# Patient Record
Sex: FEMALE | Race: WHITE | NOT HISPANIC OR LATINO | Employment: OTHER | URBAN - METROPOLITAN AREA
[De-identification: names, ages, dates, MRNs, and addresses within clinical notes are randomized per-mention and may not be internally consistent; named-entity substitution may affect disease eponyms.]

---

## 2017-01-10 ENCOUNTER — OFFICE VISIT (OUTPATIENT)
Dept: FAMILY MEDICINE | Facility: CLINIC | Age: 72
End: 2017-01-10
Payer: COMMERCIAL

## 2017-01-10 VITALS
SYSTOLIC BLOOD PRESSURE: 139 MMHG | TEMPERATURE: 97.6 F | OXYGEN SATURATION: 99 % | WEIGHT: 217 LBS | HEIGHT: 70 IN | RESPIRATION RATE: 16 BRPM | DIASTOLIC BLOOD PRESSURE: 80 MMHG | BODY MASS INDEX: 31.07 KG/M2 | HEART RATE: 59 BPM

## 2017-01-10 DIAGNOSIS — Z12.31 VISIT FOR SCREENING MAMMOGRAM: ICD-10-CM

## 2017-01-10 DIAGNOSIS — Z11.59 NEED FOR HEPATITIS C SCREENING TEST: ICD-10-CM

## 2017-01-10 DIAGNOSIS — Z23 NEED FOR VACCINATION: ICD-10-CM

## 2017-01-10 DIAGNOSIS — Z00.00 ROUTINE HISTORY AND PHYSICAL EXAMINATION OF ADULT: Primary | ICD-10-CM

## 2017-01-10 DIAGNOSIS — I10 ESSENTIAL HYPERTENSION: ICD-10-CM

## 2017-01-10 DIAGNOSIS — Z12.11 COLON CANCER SCREENING: ICD-10-CM

## 2017-01-10 DIAGNOSIS — Z11.59 SCREENING FOR VIRAL DISEASE: ICD-10-CM

## 2017-01-10 LAB
ANION GAP SERPL CALCULATED.3IONS-SCNC: 9 MMOL/L (ref 3–14)
BUN SERPL-MCNC: 24 MG/DL (ref 7–30)
CALCIUM SERPL-MCNC: 9.2 MG/DL (ref 8.5–10.1)
CHLORIDE SERPL-SCNC: 104 MMOL/L (ref 94–109)
CO2 SERPL-SCNC: 28 MMOL/L (ref 20–32)
CREAT SERPL-MCNC: 0.93 MG/DL (ref 0.52–1.04)
ERYTHROCYTE [DISTWIDTH] IN BLOOD BY AUTOMATED COUNT: 13.8 % (ref 10–15)
GFR SERPL CREATININE-BSD FRML MDRD: 59 ML/MIN/1.7M2
GLUCOSE SERPL-MCNC: 123 MG/DL (ref 70–99)
HCT VFR BLD AUTO: 42.8 % (ref 35–47)
HCV AB SERPL QL IA: NORMAL
HGB BLD-MCNC: 13.6 G/DL (ref 11.7–15.7)
MCH RBC QN AUTO: 28.9 PG (ref 26.5–33)
MCHC RBC AUTO-ENTMCNC: 31.8 G/DL (ref 31.5–36.5)
MCV RBC AUTO: 91 FL (ref 78–100)
PLATELET # BLD AUTO: 281 10E9/L (ref 150–450)
POTASSIUM SERPL-SCNC: 4.1 MMOL/L (ref 3.4–5.3)
RBC # BLD AUTO: 4.71 10E12/L (ref 3.8–5.2)
SODIUM SERPL-SCNC: 141 MMOL/L (ref 133–144)
TSH SERPL DL<=0.005 MIU/L-ACNC: 2.52 MU/L (ref 0.4–4)
WBC # BLD AUTO: 5.5 10E9/L (ref 4–11)

## 2017-01-10 PROCEDURE — 80048 BASIC METABOLIC PNL TOTAL CA: CPT | Performed by: INTERNAL MEDICINE

## 2017-01-10 PROCEDURE — G0439 PPPS, SUBSEQ VISIT: HCPCS | Performed by: INTERNAL MEDICINE

## 2017-01-10 PROCEDURE — G0009 ADMIN PNEUMOCOCCAL VACCINE: HCPCS | Performed by: INTERNAL MEDICINE

## 2017-01-10 PROCEDURE — 85027 COMPLETE CBC AUTOMATED: CPT | Performed by: INTERNAL MEDICINE

## 2017-01-10 PROCEDURE — 36415 COLL VENOUS BLD VENIPUNCTURE: CPT | Performed by: INTERNAL MEDICINE

## 2017-01-10 PROCEDURE — 84443 ASSAY THYROID STIM HORMONE: CPT | Performed by: INTERNAL MEDICINE

## 2017-01-10 PROCEDURE — 86803 HEPATITIS C AB TEST: CPT | Performed by: INTERNAL MEDICINE

## 2017-01-10 PROCEDURE — 90732 PPSV23 VACC 2 YRS+ SUBQ/IM: CPT | Performed by: INTERNAL MEDICINE

## 2017-01-10 NOTE — PROGRESS NOTES
SUBJECTIVE:                                                            Tonia Villavicencio is a 71 year old female who presents for Preventive Visit.      Are you in the first 12 months of your Medicare Part B coverage?  No    Healthy Habits:    Do you get at least three servings of calcium containing foods daily (dairy, green leafy vegetables, etc.)? 2 servings    Amount of exercise or daily activities, outside of work: no    Problems taking medications regularly No    Medication side effects: No    Have you had an eye exam in the past two years? no    Do you see a dentist twice per year? yes    Do you have sleep apnea, excessive snoring or daytime drowsiness?no    COGNITIVE SCREEN  1) Repeat 3 items (Banana, Sunrise, Chair)    2) Clock draw: NORMAL  3) 3 item recall: Recalls 3 objects  Results: 3 items recalled: COGNITIVE IMPAIRMENT LESS LIKELY    Mini-CogTM Copyright S Kathleen. Licensed by the author for use in [facility]; reprinted with permission (milton@[facility]). All rights reserved.      Patient is fasting for today's physical  She is compliant with her medications  Believes that medications are working for her  Does not have any concerns or known side effects      All Histories reviewed and updated in [facility] as appropriate.  Social History   Substance Use Topics     Smoking status: Never Smoker      Smokeless tobacco: Never Used     Alcohol Use: No       The patient does not drink >3 drinks per day nor >7 drinks per week.    Today's PHQ-2 Score:   PHQ-2 ( 1999 Pfizer) 1/10/2017 5/9/2016   Q1: Little interest or pleasure in doing things 0 0   Q2: Feeling down, depressed or hopeless 0 0   PHQ-2 Score 0 0       Do you feel safe in your environment - Yes    Do you have a Health Care Directive?: No: Advance care planning reviewed with patient; information given to patient to review.    Current providers sharing in care for this patient include:   Patient Care Team:  Blanca Mariee MD as PCP - General  (Internal Medicine)      Hearing impairment: No    Ability to successfully perform activities of daily living: Yes, no assistance needed     Fall risk:  Fallen 2 or more times in the past year?: No  Any fall with injury in the past year?: Yes  Timed Up and Go Test (>13.5 is fall risk; contact physician) : 7    Home safety:  lack of grab bars in the bathroom  click delete button to remove this line now    The following health maintenance items are reviewed in Epic and correct as of today:  Health Maintenance   Topic Date Due     HEPATITIS C SCREENING  04/16/1963     FALL RISK ASSESSMENT  01/11/2017     PNEUMOCOCCAL (2 of 2 - PPSV23) 01/14/2017     COLON CANCER SCREEN (SYSTEM ASSIGNED)  08/27/2017     INFLUENZA VACCINE (SYSTEM ASSIGNED)  09/01/2017     MAMMO SCREEN Q2 YR (SYSTEM ASSIGNED)  01/19/2018     LIPID SCREEN Q5 YR FEMALE (SYSTEM ASSIGNED)  11/04/2019     ADVANCE DIRECTIVE PLANNING Q5 YRS (NO INBASKET)  01/29/2020     TETANUS IMMUNIZATION (SYSTEM ASSIGNED)  01/11/2026     DEXA SCAN SCREENING (SYSTEM ASSIGNED)  Completed         Pneumonia Vaccine:given  Mammogram Screening: Patient over age 50, mutual decision to screen reflected in health maintenance.  History of abnormal Pap smear: NO - age 65 - see link Cervical Cytology Screening Guidelines     ROS:  C: NEGATIVE for fever, chills, change in weight  CONSTITUTIONAL:NEGATIVE for fever, chills, change in weight  I: NEGATIVE for worrisome rashes, moles or lesions  E: NEGATIVE for vision changes or irritation  ENT: NEGATIVE for ear, mouth and throat problems  R: NEGATIVE for significant cough or SOB  B: NEGATIVE for masses, tenderness or discharge  CV: NEGATIVE for chest pain, palpitations or peripheral edema  GI: NEGATIVE for nausea, abdominal pain, heartburn, or change in bowel habits  : NEGATIVE for unusual urinary or vaginal symptoms. No vaginal bleeding.  M: NEGATIVE for significant arthralgias or myalgia  N: NEGATIVE for weakness, dizziness or  "paresthesias  P: NEGATIVE for changes in mood or affect     Patient Active Problem List   Diagnosis     Essential hypertension     Hyperlipidemia LDL goal <160     Elevated blood sugar     Hyperlipidemia     Past Surgical History   Procedure Laterality Date     Gyn surgery       ovarian cyst removal and ovary     Arthroscopy knee         Social History   Substance Use Topics     Smoking status: Never Smoker      Smokeless tobacco: Never Used     Alcohol Use: No     Family History   Problem Relation Age of Onset     HEART DISEASE Mother      Prostate Cancer Father      DIABETES Brother      Breast Cancer No family hx of      Colon Cancer No family hx of          Current Outpatient Prescriptions   Medication Sig Dispense Refill     cyclobenzaprine (FLEXERIL) 10 MG tablet NOT COVER/// TAKE 0.5-1 TABLETS (5-10 MG) BY MOUTH 2 TIMES DAILY AS NEEDED FOR MUSCLE SPASMS 30 tablet 1     metoprolol (LOPRESSOR) 50 MG tablet Take 1 tablet (50 mg) by mouth 2 times daily 180 tablet 3     triamterene-hydrochlorothiazide (MAXZIDE) 75-50 MG per tablet Take 1 tablet by mouth daily 90 tablet 3     naproxen (NAPROSYN) 500 MG tablet Take 1 tablet (500 mg) by mouth 2 times daily as needed for moderate pain 60 tablet 2     Allergies   Allergen Reactions     No Known Allergies        This document serves as a record of the services and decisions personally performed and made by Blanca Mariee MD. It was created on her behalf by Jose Mcmahan, a trained medical scribe. The creation of this document is based on the provider's statements to the medical scribe.    Drew Mcmahan 10:44 AM, January 10, 2017    OBJECTIVE:                                                            /80 mmHg  Pulse 59  Temp(Src) 97.6  F (36.4  C) (Oral)  Resp 16  Ht 1.778 m (5' 10\")  Wt 98.431 kg (217 lb)  BMI 31.14 kg/m2  SpO2 99%  LMP  (LMP Unknown)  Breastfeeding? No Estimated body mass index is 31.14 kg/(m^2) as calculated from the " "following:    Height as of this encounter: 5' 10\" (1.778 m).    Weight as of this encounter: 217 lb (98.431 kg).  EXAM:   GENERAL APPEARANCE: healthy, alert and no distress  EYES: Eyes grossly normal to inspection, PERRL and conjunctivae and sclerae normal  HENT: ear canals and TM's normal, nose and mouth without ulcers or lesions, oropharynx clear and oral mucous membranes moist  NECK: no adenopathy, no asymmetry, masses, or scars and thyroid normal to palpation  RESP: lungs clear to auscultation - no rales, rhonchi or wheezes  BREAST: normal without masses, tenderness or nipple discharge and no palpable axillary masses or adenopathy  CV: regular rate and rhythm, normal S1 S2, no S3 or S4, no murmur, click or rub, no peripheral edema and peripheral pulses strong  ABDOMEN: soft, nontender, no hepatosplenomegaly, no masses and bowel sounds normal  MS: no musculoskeletal defects are noted and gait is age appropriate without ataxia  SKIN: no suspicious lesions or rashes  LE: Prominent varicose and spider veins bilaterally in LE  NEURO: Normal strength and tone, sensory exam grossly normal, mentation intact and speech normal  PSYCH: mentation appears normal and affect normal/bright    ASSESSMENT / PLAN:                                                              Tonia was seen today for physical.    Diagnoses and all orders for this visit:    Routine history and physical examination of adult  Patient is healthy other than for health concerns listed below  Testing will show medication management and reveal other health concerns  Discussed the importance of exercise and a healthy diet  -     LIPID REFLEX TO DIRECT LDL PANEL; Future  -     TSH with free T4 reflex  -     CBC with platelets    Visit for screening mammogram  -     *MA Screening Digital Bilateral; Future    Need for hepatitis C screening test  We discussed Hep C screening with patient  Patient agreed so test was ordered  Will follow up on this result  -   " "  Hepatitis C Screen Reflex to HCV RNA Quant and Genotype    Screening for viral disease  See the above note  -     Hepatitis C Screen Reflex to HCV RNA Quant and Genotype    Colon cancer screening  -     GASTROENTEROLOGY ADULT REFERRAL +/- PROCEDURE    Essential hypertension  Discussed the importance of keeping BP under good control to reduce the risk of heart attack and stroke.   Advised to monitor bp once a week  at home and bring those readings on next visit.  Notify us if bp readings consistently stay greater than 140/90 mmHg  -     Basic metabolic panel    Need for vaccination  -     Pneumococcal vaccine 23 valent (Pneumovax) [30592]  -     ADMIN PNEUMOCOCCAL VACCINE (For MEDICARE Pt. ONLY) []      End of Life Planning:  Patient currently has an advanced directive: Yes.  Practitioner is supportive of decision.    COUNSELING:  Reviewed preventive health counseling, as reflected in patient instructions  Special attention given to:       Regular exercise       Healthy diet/nutrition    BP Screening:   Last 3 BP Readings:    BP Readings from Last 3 Encounters:   01/10/17 139/80   11/16/16 127/82   09/06/16 146/98       The following was recommended to the patient:  Re-screen BP within a year and recommended lifestyle modifications      Estimated body mass index is 31.14 kg/(m^2) as calculated from the following:    Height as of this encounter: 5' 10\" (1.778 m).    Weight as of this encounter: 217 lb (98.431 kg).  Weight management plan: Discussed healthy diet and exercise guidelines and patient will follow up in 6 months in clinic to re-evaluate.   reports that she has never smoked. She has never used smokeless tobacco.      Appropriate preventive services were discussed with this patient, including applicable screening as appropriate for cardiovascular disease, diabetes, osteopenia/osteoporosis, and glaucoma.  As appropriate for age/gender, discussed screening for colorectal cancer, prostate cancer, breast " cancer, and cervical cancer. Checklist reviewing preventive services available has been given to the patient.    Reviewed patients plan of care and provided an AVS. The Basic Care Plan (routine screening as documented in Health Maintenance) for Tonia meets the Care Plan requirement. This Care Plan has been established and reviewed with the Patient.    Counseling Resources:  ATP IV Guidelines  Pooled Cohorts Equation Calculator  Breast Cancer Risk Calculator  FRAX Risk Assessment  ICSI Preventive Guidelines  Dietary Guidelines for Americans, 2010  USDA's MyPlate  ASA Prophylaxis  Lung CA Screening    Follow up in 6 months  Patient was advised to seek sooner medical attention if there is any new or worsening symptoms    The information in this document, created by the medical scribe for me, accurately reflects the services I personally performed and the decisions made by me. I have reviewed and approved this document for accuracy prior to leaving the patient care area.  Blanca Mariee MD  10:24 AM, 01/10/2017    Blanca Mariee MD  [facility]

## 2017-01-10 NOTE — MR AVS SNAPSHOT
After Visit Summary   1/10/2017    Tonia Villavicencio    MRN: [id number]           Patient Information     Date Of Birth          1945        Visit Information        Provider Department      1/10/2017 9:30 AM Blanca Mariee MD [facility]        Today's Diagnoses     Routine history and physical examination of adult    -  1     Visit for screening mammogram         Need for hepatitis C screening test         Screening for viral disease         Colon cancer screening         Essential hypertension           Care Instructions      Preventive Health Recommendations    Female Ages 65 +    Yearly exam:     See your health care provider every year in order to  o Review health changes.   o Discuss preventive care.    o Review your medicines if your doctor has prescribed any.      You no longer need a yearly Pap test unless you've had an abnormal Pap test in the past 10 years. If you have vaginal symptoms, such as bleeding or discharge, be sure to talk with your provider about a Pap test.      Every 1 to 2 years, have a mammogram.  If you are over 69, talk with your health care provider about whether or not you want to continue having screening mammograms.      Every 10 years, have a colonoscopy. Or, have a yearly FIT test (stool test). These exams will check for colon cancer.       Have a cholesterol test every 5 years, or more often if your doctor advises it.       Have a diabetes test (fasting glucose) every three years. If you are at risk for diabetes, you should have this test more often.       At age 65, have a bone density scan (DEXA) to check for osteoporosis (brittle bone disease).    Shots:    Get a flu shot each year.    Get a tetanus shot every 10 years.    Talk to your doctor about your pneumonia vaccines. There are now two you should receive - Pneumovax (PPSV 23) and Prevnar (PCV 13).    Talk to your doctor about the shingles vaccine.    Talk to your doctor about the hepatitis  B vaccine.    Nutrition:     Eat at least 5 servings of fruits and vegetables each day.      Eat whole-grain bread, whole-wheat pasta and brown rice instead of white grains and rice.      Talk to your provider about Calcium and Vitamin D.     Lifestyle    Exercise at least 150 minutes a week (30 minutes a day, 5 days a week). This will help you control your weight and prevent disease.      Limit alcohol to one drink per day.      No smoking.       Wear sunscreen to prevent skin cancer.       See your dentist twice a year for an exam and cleaning.      See your eye doctor every 1 to 2 years to screen for conditions such as glaucoma, macular degeneration and cataracts.      Monitor your blood pressure once a week  at home.  Bring those readings on your next visit.  Notify us if your blood pressure readings consistently stays greater than 140/90.  Follow up in 6 months          Follow-ups after your visit        Additional Services     GASTROENTEROLOGY ADULT REFERRAL +/- PROCEDURE       Your provider has referred you to Gastroenterology Services.    English    Procedure/Referral: PROCEDURE ONLY - COLONOSCOPY - Reason for procedure: Screening  [facility]: Liu Mayen (all patients will be contacted by JUDITH Mayen to schedule appointment - This includes [facility], [facility] and [facility]) - [place]ia [phone]   [email or web address]      Please be aware that coverage of these services is subject to the terms and limitations of your health insurance plan.  Call member services at your health plan with any benefit or coverage questions.  Any procedures must be performed at a [facility] facility OR coordinated by your clinic's referral office.    Please bring the following with you to your appointment:    (1) Any X-Rays, CTs or MRIs which have been performed.  Contact the facility where they were done to arrange for  " prior to your scheduled appointment.    (2) List of current medications   (3) This referral request   (4) Any documents/labs given to you for this referral                  Future tests that were ordered for you today     Open Future Orders        Priority Expected Expires Ordered    *MA Screening Digital Bilateral Routine  1/10/2018 1/10/2017    LIPID REFLEX TO DIRECT LDL PANEL Routine 2017 2017 1/10/2017            Who to contact     If you have questions or need follow up information about today's clinic visit or your schedule please contact [facility] directly at [phone].  Normal or non-critical lab and imaging results will be communicated to you by Rocket.Lahart, letter or phone within 4 business days after the clinic has received the results. If you do not hear from us within 7 days, please contact the clinic through R[employer]Lahart or phone. If you have a critical or abnormal lab result, we will notify you by phone as soon as possible.  Submit refill requests through Netotiate or call your pharmacy and they will forward the refill request to us. Please allow 3 business days for your refill to be completed.          Additional Information About Your Visit        MyChart Information     Netotiate lets you send messages to your doctor, view your test results, renew your prescriptions, schedule appointments and more. To sign up, go to [email or web address] . Click on \"Log in\" on the left side of the screen, which will take you to the Welcome page. Then click on \"Sign up Now\" on the right side of the page.     You will be asked to enter the access code listed below, as well as some personal information. Please follow the directions to create your username and password.     Your access code is: [email or web address]  Expires: 2017 10:34 AM     Your access code will  in 90 days. If you need help or a new code, please call your [facility] [place] or [phone].        Care EveryWhere ID     This is " "your Care EveryWhere ID. This could be used by other organizations to access your [facility] medical records  [id number]        Your Vitals Were     Pulse Temperature Respirations    59 97.6  F (36.4  C) (Oral) 16    Height BMI (Body Mass Index) Pulse Oximetry    5' 10\" (1.778 m) 31.14 kg/m2 99%    Last Period Breastfeeding?       (LMP Unknown) No        Blood Pressure from Last 3 Encounters:   01/10/17 139/80   11/16/16 127/82   09/06/16 146/98    Weight from Last 3 Encounters:   01/10/17 217 lb (98.431 kg)   11/16/16 215 lb (97.523 kg)   05/09/16 219 lb 9.6 oz (99.61 kg)              We Performed the Following     Basic metabolic panel     CBC with platelets     GASTROENTEROLOGY ADULT REFERRAL +/- PROCEDURE     Hepatitis C Screen Reflex to HCV RNA Quant and Genotype     TSH with free T4 reflex        Primary Care Provider Office Phone # Fax #    Blanca JOE Mariee [phone]       [facility]    [address] [facility] [address]  [place] [address]        Thank you!     Thank you for choosing [facility]  for your care. Our goal is always to provide you with excellent care. Hearing back from our patients is one way we can continue to improve our services. Please take a few minutes to complete the written survey that you may receive in the mail after your visit with us. Thank you!             Your Updated Medication List - Protect others around you: Learn how to safely use, store and throw away your medicines at www.disposemymeds.org.          This list is accurate as of: 1/10/17 10:21 AM.  Always use your most recent med list.                   Brand Name Dispense Instructions for use    cyclobenzaprine 10 MG tablet    FLEXERIL    30 tablet    NOT COVER/// TAKE 0.5-1 TABLETS (5-10 MG) BY MOUTH 2 TIMES DAILY AS NEEDED FOR MUSCLE SPASMS       metoprolol 50 MG tablet    LOPRESSOR    180 tablet    Take 1 tablet (50 mg) by mouth 2 times daily       naproxen 500 MG tablet    " NAPROSYN    60 tablet    Take 1 tablet (500 mg) by mouth 2 times daily as needed for moderate pain       triamterene-hydrochlorothiazide 75-50 MG per tablet    MAXZIDE    90 tablet    Take 1 tablet by mouth daily

## 2017-01-10 NOTE — NURSING NOTE
"Chief Complaint   Patient presents with     Physical     fasting       Initial /88 mmHg  Pulse 59  Temp(Src) 97.6  F (36.4  C) (Oral)  Resp 16  Ht 5' 10\" (1.778 m)  Wt 217 lb (98.431 kg)  BMI 31.14 kg/m2  SpO2 99%  LMP  (LMP Unknown)  Breastfeeding? No Estimated body mass index is 31.14 kg/(m^2) as calculated from the following:    Height as of this encounter: 5' 10\" (1.778 m).    Weight as of this encounter: 217 lb (98.431 kg).  BP completed using cuff size: joel Galeano CMA January 10, 2017 9:52 AM      "

## 2017-01-10 NOTE — PATIENT INSTRUCTIONS

## 2017-01-10 NOTE — Clinical Note
[facility]  [address] [facility]  [address]  [place]  [address]  Tel: [phone]    January 11, 2017    Tonia Villavicencio  [address] [place] [facility] [address]        Dear Ms. Villavicencio,    Hepatitis C Antibody is negative.  CBC result which includes white count Hemoglobin and  Platelet Counts is normal.   TSH which is thyroid hormone is normal.  Basic metabolic panel which includes electrolytes and kidney function is satisfactory.  Glucose which is your blood sugar is mildly elevated.  Eat low cholesterol low fat  diet and do regular physical activity. Avoid high sugar containing food    If you have any further questions or problems, please contact our office.      Sincerely,    Blanca Mariee MD    Results for orders placed or performed in visit on 01/10/17   TSH with free T4 reflex   Result Value Ref Range    TSH 2.52 0.40 - 4.00 mU/L   CBC with platelets   Result Value Ref Range    WBC 5.5 4.0 - 11.0 10e9/L    RBC Count 4.71 3.8 - 5.2 10e12/L    Hemoglobin 13.6 11.7 - 15.7 g/dL    Hematocrit 42.8 35.0 - 47.0 %    MCV 91 78 - 100 fl    MCH 28.9 26.5 - 33.0 pg    MCHC 31.8 31.5 - 36.5 g/dL    RDW 13.8 10.0 - 15.0 %    Platelet Count 281 150 - 450 10e9/L   Basic metabolic panel   Result Value Ref Range    Sodium 141 133 - 144 mmol/L    Potassium 4.1 3.4 - 5.3 mmol/L    Chloride 104 94 - 109 mmol/L    Carbon Dioxide 28 20 - 32 mmol/L    Anion Gap 9 3 - 14 mmol/L    Glucose 123 (H) 70 - 99 mg/dL    Urea Nitrogen 24 7 - 30 mg/dL    Creatinine 0.93 0.52 - 1.04 mg/dL    GFR Estimate 59 (L) >60 mL/min/1.7m2    GFR Estimate If Black 72 >60 mL/min/1.7m2    Calcium 9.2 8.5 - 10.1 mg/dL   Hepatitis C Screen Reflex to HCV RNA Quant and Genotype   Result Value Ref Range    Hepatitis C Antibody  NR     Nonreactive   Assay performance characteristics have not been established for newborns,   infants, and children

## 2017-01-11 ASSESSMENT — PATIENT HEALTH QUESTIONNAIRE - PHQ9: SUM OF ALL RESPONSES TO PHQ QUESTIONS 1-9: 0

## 2017-01-11 NOTE — PROGRESS NOTES
Quick Note:    Please Notify the patient  Hepatitis C Antibody is negative.  CBC result which includes white count Hemoglobin and Platelet Counts is normal.   TSH which is thyroid hormone is normal.  Basic metabolic panel which includes electrolytes and kidney function is satisfactory.  Glucose which is your blood sugar is mildly elevated.  Eat low cholesterol low fat diet and do regular physical activity. Avoid high sugar containing food  Please send the copy of lab results also to this patient.        ______

## 2017-01-20 ENCOUNTER — HOSPITAL ENCOUNTER (OUTPATIENT)
Dept: MAMMOGRAPHY | Facility: CLINIC | Age: 72
Discharge: HOME | End: 2017-01-20
Attending: INTERNAL MEDICINE | Admitting: INTERNAL MEDICINE
Payer: MEDICARE

## 2017-01-20 DIAGNOSIS — Z12.31 VISIT FOR SCREENING MAMMOGRAM: ICD-10-CM

## 2017-01-20 PROCEDURE — G0202 SCR MAMMO BI INCL CAD: HCPCS

## 2017-02-28 ENCOUNTER — OFFICE VISIT (OUTPATIENT)
Dept: FAMILY MEDICINE | Facility: CLINIC | Age: 72
End: 2017-02-28
Payer: COMMERCIAL

## 2017-02-28 VITALS
SYSTOLIC BLOOD PRESSURE: 134 MMHG | HEART RATE: 74 BPM | BODY MASS INDEX: 31.98 KG/M2 | WEIGHT: 223.4 LBS | DIASTOLIC BLOOD PRESSURE: 85 MMHG | HEIGHT: 70 IN | OXYGEN SATURATION: 99 % | TEMPERATURE: 97.6 F

## 2017-02-28 DIAGNOSIS — N76.0 VAGINITIS AND VULVOVAGINITIS: Primary | ICD-10-CM

## 2017-02-28 LAB
MICRO REPORT STATUS: ABNORMAL
SPECIMEN SOURCE: ABNORMAL
WET PREP SPEC: ABNORMAL

## 2017-02-28 PROCEDURE — 99213 OFFICE O/P EST LOW 20 MIN: CPT | Performed by: NURSE PRACTITIONER

## 2017-02-28 PROCEDURE — 87210 SMEAR WET MOUNT SALINE/INK: CPT | Performed by: NURSE PRACTITIONER

## 2017-02-28 RX ORDER — FLUCONAZOLE 150 MG/1
150 TABLET ORAL ONCE
Qty: 2 TABLET | Refills: 0 | Status: SHIPPED | OUTPATIENT
Start: 2017-02-28 | End: 2017-02-28

## 2017-02-28 NOTE — MR AVS SNAPSHOT
"              After Visit Summary   2017    Tonia Villavicencio    MRN: [id number]           Patient Information     Date Of Birth          1945        Visit Information        Provider Department      2017 2:30 PM Trish Helton APRN CNP [facility]        Today's Diagnoses     Vaginitis and vulvovaginitis    -  1       Follow-ups after your visit        Who to contact     If you have questions or need follow up information about today's clinic visit or your schedule please contact [facility] directly at [phone].  Normal or non-critical lab and imaging results will be communicated to you by FXTriphart, letter or phone within 4 business days after the clinic has received the results. If you do not hear from us within 7 days, please contact the clinic through FXTriphart or phone. If you have a critical or abnormal lab result, we will notify you by phone as soon as possible.  Submit refill requests through [email or web address] or call your pharmacy and they will forward the refill request to us. Please allow 3 business days for your refill to be completed.          Additional Information About Your Visit        MyChart Information     [email or web address] lets you send messages to your doctor, view your test results, renew your prescriptions, schedule appointments and more. To sign up, go to [email or web address] . Click on \"Log in\" on the left side of the screen, which will take you to the Welcome page. Then click on \"Sign up Now\" on the right side of the page.     You will be asked to enter the access code listed below, as well as some personal information. Please follow the directions to create your username and password.     Your access code is: [email or web address]  Expires: 2017  4:20 PM     Your access code will  in 90 days. If you need help or a new code, please call your [facility] or [phone].        Care EveryWhere ID     This is your Care EveryWhere ID. This could be used by " "other organizations to access your [facility] medical records  [id number]        Your Vitals Were     Pulse Temperature Height Last Period Pulse Oximetry Breastfeeding?    74 97.6  F (36.4  C) (Oral) 5' 10\" (1.778 m) (LMP Unknown) 99% No    BMI (Body Mass Index)                   32.05 kg/m2            Blood Pressure from Last 3 Encounters:   02/28/17 134/85   01/10/17 139/80   11/16/16 127/82    Weight from Last 3 Encounters:   02/28/17 223 lb 6.4 oz (101.3 kg)   01/10/17 217 lb (98.4 kg)   11/16/16 215 lb (97.5 kg)              We Performed the Following     Wet prep          Today's Medication Changes          These changes are accurate as of: 2/28/17  4:20 PM.  If you have any questions, ask your nurse or doctor.               Start taking these medicines.        Dose/Directions    fluconazole 150 MG tablet   Commonly known as:  DIFLUCAN   Used for:  Vaginitis and vulvovaginitis   Started by:  Trish Helton APRN CNP        Dose:  150 mg   Take 1 tablet (150 mg) by mouth once for 1 dose Take one today and one again in 3 days   Quantity:  2 tablet   Refills:  0            Where to get your medicines      These medications were sent to [facility] [address] [facility] [address]  [address], [place] [address], [facility] [address]     Phone:  [phone]     fluconazole 150 MG tablet                Primary Care Provider Office Phone # Fax #    Blanca Mariee [phone]       [facility]    [address] [place] [address] [place] [facility] [address]  [facility] [address]        Thank you!     Thank you for choosing [facility]  for your care. Our goal is always to provide you with excellent care. Hearing back from our patients is one way we can continue to improve our services. Please take a few minutes to complete the written survey that you may receive in the mail after your visit with us. Thank you!             Your Updated Medication List - Protect others around " you: Learn how to safely use, store and throw away your medicines at www.disposemymeds.org.          This list is accurate as of: 2/28/17  4:20 PM.  Always use your most recent med list.                   Brand Name Dispense Instructions for use    cyclobenzaprine 10 MG tablet    FLEXERIL    30 tablet    NOT COVER/// TAKE 0.5-1 TABLETS (5-10 MG) BY MOUTH 2 TIMES DAILY AS NEEDED FOR MUSCLE SPASMS       fluconazole 150 MG tablet    DIFLUCAN    2 tablet    Take 1 tablet (150 mg) by mouth once for 1 dose Take one today and one again in 3 days       metoprolol 50 MG tablet    LOPRESSOR    180 tablet    Take 1 tablet (50 mg) by mouth 2 times daily       naproxen 500 MG tablet    NAPROSYN    60 tablet    Take 1 tablet (500 mg) by mouth 2 times daily as needed for moderate pain       triamterene-hydrochlorothiazide 75-50 MG per tablet    MAXZIDE    90 tablet    Take 1 tablet by mouth daily

## 2017-02-28 NOTE — NURSING NOTE
"Chief Complaint   Patient presents with     Vaginitis       Initial /85 (BP Location: Left arm, Patient Position: Chair, Cuff Size: Adult Large)  Pulse 74  Temp 97.6  F (36.4  C) (Oral)  Ht 5' 10\" (1.778 m)  Wt 223 lb 6.4 oz (101.3 kg)  LMP  (LMP Unknown)  SpO2 99%  Breastfeeding? No  BMI 32.05 kg/m2 Estimated body mass index is 32.05 kg/(m^2) as calculated from the following:    Height as of this encounter: 5' 10\" (1.778 m).    Weight as of this encounter: 223 lb 6.4 oz (101.3 kg).  Medication Reconciliation: complete.  Stephany Voss CMA    "

## 2017-02-28 NOTE — LETTER
[place] [address] [facility]  [address]  [place]  [address]  Tel: [phone]    February 28, 2017    Tonia Villavicencio  [address] [place] [facility] [address]        Dear Ms. Villavicencio,    The results of your recent labs are enclosed.    If you have any further questions or problems, please contact our office.      Sincerely,    Trish Helton, NP/daryn    Results for orders placed or performed in visit on 02/28/17   Wet prep   Result Value Ref Range    Specimen Description Vagina     Wet Prep (A)      No Trichomonas seen  No clue cells seen  Yeast seen      Micro Report Status FINAL 02/28/2017            Enclosure: Lab Results

## 2017-02-28 NOTE — PROGRESS NOTES
SUBJECTIVE:                                                    Tonia Villavicencio is a 71 year old female who presents to clinic today for the following health issues:      Vaginal Symptoms      Duration: 1 week ago was on amoxicillin for a gum infection  And developed an intravaginal itching, no known discharge no odor     Description  itching    Intensity:  mild    Accompanying signs and symptoms (fever/dysuria/abdominal or back pain): None    History  Sexually active:   Possibility of pregnancy: No  Recent antibiotic use: YES- amoxicillin    Precipitating or alleviating factors: None    Therapies tried and outcome: none   Outcome:        Problem list and histories reviewed & adjusted, as indicated.  Additional history: as documented    Patient Active Problem List   Diagnosis     Essential hypertension     Hyperlipidemia LDL goal <160     Elevated blood sugar     Hyperlipidemia     Past Surgical History   Procedure Laterality Date     Gyn surgery       ovarian cyst removal and ovary     Arthroscopy knee         Social History   Substance Use Topics     Smoking status: Never Smoker     Smokeless tobacco: Never Used     Alcohol use No     Family History   Problem Relation Age of Onset     HEART DISEASE Mother      Prostate Cancer Father      DIABETES Brother      Breast Cancer No family hx of      Colon Cancer No family hx of          Current Outpatient Prescriptions   Medication Sig Dispense Refill     cyclobenzaprine (FLEXERIL) 10 MG tablet NOT COVER/// TAKE 0.5-1 TABLETS (5-10 MG) BY MOUTH 2 TIMES DAILY AS NEEDED FOR MUSCLE SPASMS 30 tablet 1     metoprolol (LOPRESSOR) 50 MG tablet Take 1 tablet (50 mg) by mouth 2 times daily 180 tablet 3     triamterene-hydrochlorothiazide (MAXZIDE) 75-50 MG per tablet Take 1 tablet by mouth daily 90 tablet 3     naproxen (NAPROSYN) 500 MG tablet Take 1 tablet (500 mg) by mouth 2 times daily as needed for moderate pain 60 tablet 2     Allergies   Allergen Reactions     No Known  "Allergies        Reviewed and updated as needed this visit by clinical staff  Allergies  Meds       Reviewed and updated as needed this visit by Provider         ROS:  Constitutional, HEENT, cardiovascular, pulmonary, gi and gu systems are negative, except as otherwise noted.    OBJECTIVE:                                                    /85 (BP Location: Left arm, Patient Position: Chair, Cuff Size: Adult Large)  Pulse 74  Temp 97.6  F (36.4  C) (Oral)  Ht 5' 10\" (1.778 m)  Wt 223 lb 6.4 oz (101.3 kg)  LMP  (LMP Unknown)  SpO2 99%  Breastfeeding? No  BMI 32.05 kg/m2  Body mass index is 32.05 kg/(m^2).  GENERAL: healthy, alert and no distress   (female): normal female external genitalia, normal urethral meatus, vaginal mucosa, without  discharge    Diagnostic Test Results:  Results for orders placed or performed in visit on 02/28/17   Wet prep   Result Value Ref Range    Specimen Description Vagina     Wet Prep (A)      No Trichomonas seen  No clue cells seen  Yeast seen      Micro Report Status FINAL 02/28/2017         ASSESSMENT/PLAN:                                                        ICD-10-CM    1. Vaginitis and vulvovaginitis N76.0 Wet prep     fluconazole (DIFLUCAN) 150 MG tablet   probably a result of having been on the amoxicillin      HALEIGH Rivera [facility] SHELLIE jaime  "

## 2017-09-27 ENCOUNTER — TELEPHONE (OUTPATIENT)
Dept: FAMILY MEDICINE | Facility: CLINIC | Age: 72
End: 2017-09-27

## 2017-09-28 NOTE — TELEPHONE ENCOUNTER
9/27/2017    Call Regarding Preventive Health Screening Colonoscopy    Attempt 1    Message on voicemail     Comments:           Outreach   AT

## 2017-11-19 DIAGNOSIS — I10 BENIGN ESSENTIAL HYPERTENSION: ICD-10-CM

## 2017-11-21 RX ORDER — METOPROLOL TARTRATE 50 MG
TABLET ORAL
Qty: 180 TABLET | Refills: 0 | Status: SHIPPED | OUTPATIENT
Start: 2017-11-21 | End: 2018-03-05

## 2017-11-21 RX ORDER — TRIAMTERENE AND HYDROCHLOROTHIAZIDE 75; 50 MG/1; MG/1
TABLET ORAL
Qty: 90 TABLET | Refills: 0 | Status: SHIPPED | OUTPATIENT
Start: 2017-11-21 | End: 2018-02-22

## 2018-02-22 DIAGNOSIS — I10 BENIGN ESSENTIAL HYPERTENSION: ICD-10-CM

## 2018-02-22 RX ORDER — TRIAMTERENE AND HYDROCHLOROTHIAZIDE 75; 50 MG/1; MG/1
TABLET ORAL
Qty: 30 TABLET | Refills: 0 | Status: SHIPPED | OUTPATIENT
Start: 2018-02-22 | End: 2018-03-05

## 2018-02-22 NOTE — TELEPHONE ENCOUNTER
"Last Written Prescription Date:  11/21/17  Last Fill Quantity: 90 tablet,  # refills: 0   Last office visit: 2/28/2017 (Sunitha) with prescribing provider:  1/10/17 (Jaylene)   Future Office Visit:      Requested Prescriptions   Pending Prescriptions Disp Refills     triamterene-hydrochlorothiazide (MAXZIDE) 75-50 MG per tablet [Pharmacy Med Name: TRIAMTERENE-HCTZ 75-50 MG TAB] 90 tablet 0     Sig: TAKE 1 TABLET BY MOUTH DAILY    Diuretics (Including Combos) Protocol Failed    2/22/2018  8:29 AM       Failed - Normal serum creatinine on file in past 12 months    Recent Labs   Lab Test  01/10/17   1036   CR  0.93             Failed - Normal serum potassium on file in past 12 months    Recent Labs   Lab Test  01/10/17   1036   POTASSIUM  4.1                   Failed - Normal serum sodium on file in past 12 months    Recent Labs   Lab Test  01/10/17   1036   NA  141             Passed - Blood pressure under 140/90 in past 12 months    BP Readings from Last 3 Encounters:   02/28/17 134/85   01/10/17 139/80   11/16/16 127/82                Passed - Recent or future visit with authorizing provider's specialty    Patient had office visit in the last year or has a visit in the next 30 days with authorizing provider.  See \"Patient Info\" tab in inbasket, or \"Choose Columns\" in Meds & Orders section of the refill encounter.            Passed - Patient is age 18 or older       Passed - No active pregancy on record       Passed - No positive pregnancy test in past 12 months          "

## 2018-02-22 NOTE — TELEPHONE ENCOUNTER
30 day gamal refill given.  Patient is due for an office visit.  Please notify and assist in scheduling.  Kandy oPon RN  [employer]

## 2018-03-05 ENCOUNTER — OFFICE VISIT (OUTPATIENT)
Dept: FAMILY MEDICINE | Facility: CLINIC | Age: 73
End: 2018-03-05
Payer: COMMERCIAL

## 2018-03-05 VITALS
SYSTOLIC BLOOD PRESSURE: 166 MMHG | HEART RATE: 63 BPM | WEIGHT: 215 LBS | HEIGHT: 70 IN | OXYGEN SATURATION: 98 % | BODY MASS INDEX: 30.78 KG/M2 | DIASTOLIC BLOOD PRESSURE: 94 MMHG

## 2018-03-05 DIAGNOSIS — R73.9 ELEVATED BLOOD SUGAR: ICD-10-CM

## 2018-03-05 DIAGNOSIS — Z91.81 AT RISK FOR FALLING: ICD-10-CM

## 2018-03-05 DIAGNOSIS — I10 BENIGN ESSENTIAL HYPERTENSION: Primary | ICD-10-CM

## 2018-03-05 DIAGNOSIS — Z12.11 SCREEN FOR COLON CANCER: ICD-10-CM

## 2018-03-05 DIAGNOSIS — E66.811 CLASS 1 OBESITY DUE TO EXCESS CALORIES WITH BODY MASS INDEX (BMI) OF 30.0 TO 30.9 IN ADULT, UNSPECIFIED WHETHER SERIOUS COMORBIDITY PRESENT: ICD-10-CM

## 2018-03-05 DIAGNOSIS — Z23 NEED FOR PROPHYLACTIC VACCINATION AND INOCULATION AGAINST INFLUENZA: ICD-10-CM

## 2018-03-05 DIAGNOSIS — E66.09 CLASS 1 OBESITY DUE TO EXCESS CALORIES WITH BODY MASS INDEX (BMI) OF 30.0 TO 30.9 IN ADULT, UNSPECIFIED WHETHER SERIOUS COMORBIDITY PRESENT: ICD-10-CM

## 2018-03-05 LAB
ANION GAP SERPL CALCULATED.3IONS-SCNC: 5 MMOL/L (ref 3–14)
BUN SERPL-MCNC: 19 MG/DL (ref 7–30)
CALCIUM SERPL-MCNC: 9.4 MG/DL (ref 8.5–10.1)
CHLORIDE SERPL-SCNC: 103 MMOL/L (ref 94–109)
CO2 SERPL-SCNC: 29 MMOL/L (ref 20–32)
CREAT SERPL-MCNC: 0.97 MG/DL (ref 0.52–1.04)
GFR SERPL CREATININE-BSD FRML MDRD: 56 ML/MIN/1.7M2
GLUCOSE SERPL-MCNC: 155 MG/DL (ref 70–99)
POTASSIUM SERPL-SCNC: 3.9 MMOL/L (ref 3.4–5.3)
SODIUM SERPL-SCNC: 137 MMOL/L (ref 133–144)

## 2018-03-05 PROCEDURE — 99214 OFFICE O/P EST MOD 30 MIN: CPT | Mod: 25 | Performed by: PHYSICIAN ASSISTANT

## 2018-03-05 PROCEDURE — 80048 BASIC METABOLIC PNL TOTAL CA: CPT | Performed by: PHYSICIAN ASSISTANT

## 2018-03-05 PROCEDURE — 36415 COLL VENOUS BLD VENIPUNCTURE: CPT | Performed by: PHYSICIAN ASSISTANT

## 2018-03-05 PROCEDURE — 90662 IIV NO PRSV INCREASED AG IM: CPT | Performed by: PHYSICIAN ASSISTANT

## 2018-03-05 PROCEDURE — G0008 ADMIN INFLUENZA VIRUS VAC: HCPCS | Performed by: PHYSICIAN ASSISTANT

## 2018-03-05 RX ORDER — TRIAMTERENE AND HYDROCHLOROTHIAZIDE 75; 50 MG/1; MG/1
1 TABLET ORAL DAILY
Qty: 90 TABLET | Refills: 3 | Status: SHIPPED | OUTPATIENT
Start: 2018-03-05 | End: 2019-03-16

## 2018-03-05 RX ORDER — METOPROLOL TARTRATE 50 MG
TABLET ORAL
Qty: 180 TABLET | Refills: 3 | Status: SHIPPED | OUTPATIENT
Start: 2018-03-05 | End: 2019-03-26

## 2018-03-05 RX ORDER — AMLODIPINE BESYLATE 2.5 MG/1
2.5 TABLET ORAL DAILY
Qty: 30 TABLET | Refills: 1 | Status: SHIPPED | OUTPATIENT
Start: 2018-03-05 | End: 2018-05-11 | Stop reason: ALTCHOICE

## 2018-03-05 NOTE — MR AVS SNAPSHOT
After Visit Summary   3/5/2018    Tonia Villavicencio    MRN: [id number]           Patient Information     Date Of Birth          1945        Visit Information        Provider Department      3/5/2018 10:00 AM Elizabeth Perez PA-C [facility]        Today's Diagnoses     Benign essential hypertension    -  1    Screen for colon cancer        At risk for falling          Care Instructions    Add Norvasc 2.5mg daily for blood pressure  Goal <130/80  Check readings and follow up with Dr. Mariee in the next month  Get your mammogram in [address]  Schedule colonoscopy     Flu shot today    Monitor diet with [email or web address]  Keep calories around 1500 calories per day            Follow-ups after your visit        Additional Services     GASTROENTEROLOGY ADULT REF PROCEDURE ONLY Denny Mayen [phone]; No Provider Preference       Last Lab Result: Creatinine (mg/dL)       Date                     Value                 01/10/2017               0.93             ----------  Body mass index is 30.85 kg/(m^2).      Patient will be contacted to schedule procedure.     Please be aware that coverage of these services is subject to the terms and limitations of your health insurance plan.  Call member services at your health plan with any benefit or coverage questions.  Any procedures must be performed at a [facility] facility OR coordinated by your clinic's referral office.    Please bring the following with you to your appointment:    (1) Any X-Rays, CTs or MRIs which have been performed.  Contact the facility where they were done to arrange for  prior to your scheduled appointment.    (2) List of current medications   (3) This referral request   (4) Any documents/labs given to you for this referral                  Who to contact     If you have questions or need follow up information about today's clinic visit or your schedule please contact [facility] SHELLIE directly at  "[phone].  Normal or non-critical lab and imaging results will be communicated to you by MyChart, letter or phone within 4 business days after the clinic has received the results. If you do not hear from us within 7 days, please contact the clinic through Angiologixhart or phone. If you have a critical or abnormal lab result, we will notify you by phone as soon as possible.  Submit refill requests through Topspin Media or call your pharmacy and they will forward the refill request to us. Please allow 3 business days for your refill to be completed.          Additional Information About Your Visit        AngiologixharStartX Information     Topspin Media lets you send messages to your doctor, view your test results, renew your prescriptions, schedule appointments and more. To sign up, go to [email or web address] . Click on \"Log in\" on the left side of the screen, which will take you to the Welcome page. Then click on \"Sign up Now\" on the right side of the page.     You will be asked to enter the access code listed below, as well as some personal information. Please follow the directions to create your username and password.     Your access code is: [email or web address]  Expires: 6/3/2018 10:33 AM     Your access code will  in 90 days. If you need help or a new code, please call your [facility] clinic or [phone].        Care EveryWhere ID     This is your Care EveryWhere ID. This could be used by other organizations to access your [facility] medical records  [id number]        Your Vitals Were     Pulse Height Last Period Pulse Oximetry Breastfeeding? BMI (Body Mass Index)    63 5' 10\" (1.778 m) (LMP Unknown) 98% No 30.85 kg/m2       Blood Pressure from Last 3 Encounters:   17 134/85   01/10/17 139/80   16 127/82    Weight from Last 3 Encounters:   18 215 lb (97.5 kg)   17 223 lb 6.4 oz (101.3 kg)   01/10/17 217 lb (98.4 kg)              We Performed the Following     Basic metabolic panel     GASTROENTEROLOGY ADULT REF " PROCEDURE ONLY Denny Mayen [phone]; No Provider Preference          Today's Medication Changes          These changes are accurate as of 3/5/18 10:34 AM.  If you have any questions, ask your nurse or doctor.               Start taking these medicines.        Dose/Directions    amLODIPine 2.5 MG tablet   Commonly known as:  NORVASC   Used for:  Benign essential hypertension   Started by:  Elizabeth Perez PA-C        Dose:  2.5 mg   Take 1 tablet (2.5 mg) by mouth daily   Quantity:  30 tablet   Refills:  1         These medicines have changed or have updated prescriptions.        Dose/Directions    metoprolol tartrate 50 MG tablet   Commonly known as:  LOPRESSOR   This may have changed:  See the new instructions.   Used for:  Benign essential hypertension   Changed by:  Elizabeth Perez PA-C        TAKE 1 TABLET (50 MG) BY MOUTH 2 TIMES DAILY   Quantity:  180 tablet   Refills:  3       triamterene-hydrochlorothiazide 75-50 MG per tablet   Commonly known as:  MAXZIDE   This may have changed:  See the new instructions.   Used for:  Benign essential hypertension   Changed by:  Elizabeth Perez PA-C        Dose:  1 tablet   Take 1 tablet by mouth daily   Quantity:  90 tablet   Refills:  3            Where to get your medicines      These medications were sent to [facility]/pharmacy #[id number] - [address] AT CORNER OF [address] [place] [address]     Phone:  [phone]     amLODIPine 2.5 MG tablet    metoprolol tartrate 50 MG tablet    triamterene-hydrochlorothiazide 75-50 MG per tablet                Primary Care Provider Office Phone # Fax #    Blanca Mariee [phone] [address] [facility] [address]  [facility] [address]        Equal Access to Services     [facility] : Sonia Lau, gunnar richardson, qaybta arjun marie, merry abrams. So [facility] [phone].    ATENCIÓN: Si  justine patrick, tiene a pradhan disposición servicios gratuitos de asistencia lingüística. Rhina denise [phone].    We comply with applicable federal civil rights laws and [place] laws. We do not discriminate on the basis of race, color, national origin, age, disability, sex, sexual orientation, or gender identity.            Thank you!     Thank you for choosing [facility]  for your care. Our goal is always to provide you with excellent care. Hearing back from our patients is one way we can continue to improve our services. Please take a few minutes to complete the written survey that you may receive in the mail after your visit with us. Thank you!             Your Updated Medication List - Protect others around you: Learn how to safely use, store and throw away your medicines at www.disposemymeds.org.          This list is accurate as of 3/5/18 10:34 AM.  Always use your most recent med list.                   Brand Name Dispense Instructions for use Diagnosis    amLODIPine 2.5 MG tablet    NORVASC    30 tablet    Take 1 tablet (2.5 mg) by mouth daily    Benign essential hypertension       cyclobenzaprine 10 MG tablet    FLEXERIL    30 tablet    TAKE 1/2-1 TABLET (5-10 MG) BY MOUTH 2 TIMES DAILY AS NEEDED FOR MUSCLE SPASMS.    Back muscle spasm       metoprolol tartrate 50 MG tablet    LOPRESSOR    180 tablet    TAKE 1 TABLET (50 MG) BY MOUTH 2 TIMES DAILY    Benign essential hypertension       triamterene-hydrochlorothiazide 75-50 MG per tablet    MAXZIDE    90 tablet    Take 1 tablet by mouth daily    Benign essential hypertension

## 2018-03-05 NOTE — PATIENT INSTRUCTIONS
Add Norvasc 2.5mg daily for blood pressure  Goal <130/80  Check readings and follow up with Dr. Mariee in the next month  Get your mammogram in [address]  Schedule colonoscopy     Flu shot today    Monitor diet with [email or web address]  Keep calories around 1500 calories per day

## 2018-03-05 NOTE — NURSING NOTE
"Chief Complaint   Patient presents with     Hypertension     med check        Initial BP (!) 166/94 (BP Location: Right arm, Cuff Size: Adult Large)  Pulse 63  Ht 5' 10\" (1.778 m)  Wt 215 lb (97.5 kg)  LMP  (LMP Unknown)  SpO2 98%  Breastfeeding? No  BMI 30.85 kg/m2 Estimated body mass index is 30.85 kg/(m^2) as calculated from the following:    Height as of this encounter: 5' 10\" (1.778 m).    Weight as of this encounter: 215 lb (97.5 kg).  Medication Reconciliation: complete       Injectable Influenza Immunization Documentation    1.  Is the person to be vaccinated sick today?  No    2. Does the person to be vaccinated have an allergy to eggs or to a component of the vaccine?  No    3. Has the person to be vaccinated today ever had a serious reaction to influenza vaccine in the past?  No    4. Has the person to be vaccinated ever had Guillain-Valmora syndrome?  No     Form completed verbally with patient. Alexus REYES MA       "

## 2018-03-05 NOTE — PROGRESS NOTES
HPI: 71 yo female here for f/u HTN  She plans to make an appt with her PCP Dr. Mariee for px.  She hasn't been checking her blood pressure as her machine not working  She is compliant with taking her maxzide and metoprolol  Denies any SE from the medications.  She has some leg cramps but these are not new.  She is an  and works 5-6 hours per day  She has OA knees and putting off surgery  She just quit playing volleyball 5 years ago  She would like to lose weight so we discussed strategies.    Past Medical History:   Diagnosis Date     Concussion      Depression     was on celexa     Elevated blood sugar      Fall     hit head     Heart murmur     negative echo     History of flexible sigmoidoscopy     5-03     Hyperlipidemia      Hypertension      Knee osteoarthritis     injected x 3     Leg injury      Low back pain     post fall     Menopausal state      Migraine     excedrin overuse/ takes ultram      Nose bleeds      Obesity, unspecified      Poor dentition      Radius fracture      Serum calcium elevated     resolved      Vaginal delivery     x 2     Varicose vein      Visit for review of DEXA scan     11-02      Vitamin D deficiency      Warts      Past Surgical History:   Procedure Laterality Date     ARTHROSCOPY KNEE       GYN SURGERY      ovarian cyst removal and ovary     Social History   Substance Use Topics     Smoking status: Never Smoker     Smokeless tobacco: Never Used     Alcohol use No     Current Outpatient Prescriptions   Medication Sig Dispense Refill     triamterene-hydrochlorothiazide (MAXZIDE) 75-50 MG per tablet Take 1 tablet by mouth daily 90 tablet 3     metoprolol tartrate (LOPRESSOR) 50 MG tablet TAKE 1 TABLET (50 MG) BY MOUTH 2 TIMES DAILY 180 tablet 3     amLODIPine (NORVASC) 2.5 MG tablet Take 1 tablet (2.5 mg) by mouth daily 30 tablet 1     [DISCONTINUED] triamterene-hydrochlorothiazide (MAXZIDE) 75-50 MG per tablet TAKE 1 TABLET BY MOUTH DAILY 30 tablet 0      "[DISCONTINUED] metoprolol (LOPRESSOR) 50 MG tablet TAKE 1 TABLET (50 MG) BY MOUTH 2 TIMES DAILY 180 tablet 0     cyclobenzaprine (FLEXERIL) 10 MG tablet TAKE 1/2-1 TABLET (5-10 MG) BY MOUTH 2 TIMES DAILY AS NEEDED FOR MUSCLE SPASMS. (Patient not taking: Reported on 3/5/2018) 30 tablet 1     Allergies   Allergen Reactions     No Known Allergies      FAMILY HISTORY NOTED AND REVIEWED    PHYSICAL EXAM:    BP (!) 166/94 (BP Location: Right arm, Cuff Size: Adult Large)  Pulse 63  Ht 5' 10\" (1.778 m)  Wt 215 lb (97.5 kg)  LMP  (LMP Unknown)  SpO2 98%  Breastfeeding? No  BMI 30.85 kg/m2    Patient appears non toxic    Rechecked BP and this remained elevated.  Neck: no carotid bruits.  Lungs: CTA bilat  Heart: RRR without m/r/g.  Extr: no edema.  Psych: approp affect and mood.    Assessment and Plan:     (I10) Benign essential hypertension  (primary encounter diagnosis)  Comment: BP not well controlled.  Recd we add an additional agent (norvasc 2.5mg qd). She is advised to purchase an [employer] blood pressure monitor and checking blood pressure frequently and bring these readings to her appt with Dr. Mariee for review to see if norvac needs to be titrated.  Plan: Basic metabolic panel,         triamterene-hydrochlorothiazide (MAXZIDE) 75-50        MG per tablet, metoprolol tartrate (LOPRESSOR)         50 MG tablet, added amLODIPine (NORVASC) 2.5 MG         Tablet QD    (E66.09,  Z68.30) Class 1 obesity due to excess calories with body mass index (BMI) of 30.0 to 30.9 in adult, unspecified whether serious comorbidity present  Comment:   Plan:   Patient Instructions   Add Norvasc 2.5mg daily for blood pressure  Goal <130/80  Check readings and follow up with Dr. Mariee in the next month  Get your mammogram in [address]  Schedule colonoscopy     Flu shot today    Monitor diet with [email or web address]  Keep calories around 1500 calories per day        (R73.9) Elevated blood sugar  Comment:   Plan: return fasting for recheck and " will need A1c as well.    (Z12.11) Screen for colon cancer  Comment:   Plan: GASTROENTEROLOGY ADULT REF PROCEDURE ONLY         Wiliamromán Mayen [phone]; No Provider        Preference            (Z23) Need for prophylactic vaccination and inoculation against influenza  Comment:   Plan: FLU VACCINE, INCREASED ANTIGEN, PRESV FREE, AGE        65+ [04062], Vaccine Administration, Initial         [[id number]]              Elizabeth Perez PA-C

## 2018-03-06 ENCOUNTER — TELEPHONE (OUTPATIENT)
Dept: FAMILY MEDICINE | Facility: CLINIC | Age: 73
End: 2018-03-06

## 2018-03-06 DIAGNOSIS — Z13.29 SCREENING FOR THYROID DISORDER: ICD-10-CM

## 2018-03-06 DIAGNOSIS — Z13.0 SCREENING FOR DEFICIENCY ANEMIA: ICD-10-CM

## 2018-03-06 DIAGNOSIS — E78.5 HYPERLIPIDEMIA LDL GOAL <160: ICD-10-CM

## 2018-03-06 DIAGNOSIS — R73.9 ELEVATED BLOOD SUGAR: ICD-10-CM

## 2018-03-06 DIAGNOSIS — I10 ESSENTIAL HYPERTENSION: Primary | ICD-10-CM

## 2018-03-06 DIAGNOSIS — E78.5 HYPERLIPIDEMIA, UNSPECIFIED HYPERLIPIDEMIA TYPE: ICD-10-CM

## 2018-03-06 NOTE — PROGRESS NOTES
Call pt  Her blood sugar is 155 which may indicate diabetes so needs fasting recheck and A1c.  She is overdue for her physical with Dr. Mariee so help her schedule that as well as previsit labs to include an A1c.    Remind her to check several blood pressure prior to appt with Dr. Mariee as well so we can see if the norvasc is effective for her.

## 2018-03-06 NOTE — PROGRESS NOTES
Pt needs future Px labs placed.    Needs recheck of BG fasting and A1C per Elizabeth.  LAbs scheduled for 3/19, Px for 3/22

## 2018-03-06 NOTE — TELEPHONE ENCOUNTER
Pt needs future Px labs placed.    Needs recheck of BG fasting and A1C per Elizabeth.    Labs scheduled for 3/19, Px for 3/22    Pended A1C, CMP, CBC, FLP for review.    Did not order TSH as WNL when checked last year, not on medication.    Nell Rubio RN

## 2018-04-30 ENCOUNTER — HOSPITAL ENCOUNTER (OUTPATIENT)
Dept: MAMMOGRAPHY | Facility: CLINIC | Age: 73
Discharge: HOME | End: 2018-04-30
Attending: INTERNAL MEDICINE | Admitting: INTERNAL MEDICINE
Payer: MEDICARE

## 2018-04-30 DIAGNOSIS — Z12.31 VISIT FOR SCREENING MAMMOGRAM: ICD-10-CM

## 2018-04-30 DIAGNOSIS — Z13.0 SCREENING FOR DEFICIENCY ANEMIA: ICD-10-CM

## 2018-04-30 DIAGNOSIS — I10 ESSENTIAL HYPERTENSION: ICD-10-CM

## 2018-04-30 DIAGNOSIS — Z13.29 SCREENING FOR THYROID DISORDER: ICD-10-CM

## 2018-04-30 DIAGNOSIS — E78.5 HYPERLIPIDEMIA LDL GOAL <160: ICD-10-CM

## 2018-04-30 DIAGNOSIS — R73.9 ELEVATED BLOOD SUGAR: ICD-10-CM

## 2018-04-30 LAB
ALBUMIN SERPL-MCNC: 3.7 G/DL (ref 3.4–5)
ALP SERPL-CCNC: 69 U/L (ref 40–150)
ALT SERPL W P-5'-P-CCNC: 36 U/L (ref 0–50)
ANION GAP SERPL CALCULATED.3IONS-SCNC: 10 MMOL/L (ref 3–14)
AST SERPL W P-5'-P-CCNC: 25 U/L (ref 0–45)
BILIRUB SERPL-MCNC: 0.6 MG/DL (ref 0.2–1.3)
BUN SERPL-MCNC: 27 MG/DL (ref 7–30)
CALCIUM SERPL-MCNC: 9.8 MG/DL (ref 8.5–10.1)
CHLORIDE SERPL-SCNC: 106 MMOL/L (ref 94–109)
CHOLEST SERPL-MCNC: 290 MG/DL
CO2 SERPL-SCNC: 25 MMOL/L (ref 20–32)
CREAT SERPL-MCNC: 1 MG/DL (ref 0.52–1.04)
ERYTHROCYTE [DISTWIDTH] IN BLOOD BY AUTOMATED COUNT: 14.1 % (ref 10–15)
GFR SERPL CREATININE-BSD FRML MDRD: 55 ML/MIN/1.7M2
GLUCOSE SERPL-MCNC: 162 MG/DL (ref 70–99)
HBA1C MFR BLD: 7.9 % (ref 0–5.6)
HCT VFR BLD AUTO: 45.5 % (ref 35–47)
HDLC SERPL-MCNC: 47 MG/DL
HGB BLD-MCNC: 14.4 G/DL (ref 11.7–15.7)
LDLC SERPL CALC-MCNC: 187 MG/DL
MCH RBC QN AUTO: 29.6 PG (ref 26.5–33)
MCHC RBC AUTO-ENTMCNC: 31.6 G/DL (ref 31.5–36.5)
MCV RBC AUTO: 93 FL (ref 78–100)
NONHDLC SERPL-MCNC: 243 MG/DL
PLATELET # BLD AUTO: 307 10E9/L (ref 150–450)
POTASSIUM SERPL-SCNC: 3.9 MMOL/L (ref 3.4–5.3)
PROT SERPL-MCNC: 7.5 G/DL (ref 6.8–8.8)
RBC # BLD AUTO: 4.87 10E12/L (ref 3.8–5.2)
SODIUM SERPL-SCNC: 141 MMOL/L (ref 133–144)
TRIGL SERPL-MCNC: 278 MG/DL
TSH SERPL DL<=0.005 MIU/L-ACNC: 2.54 MU/L (ref 0.4–4)
WBC # BLD AUTO: 6 10E9/L (ref 4–11)

## 2018-04-30 PROCEDURE — 84443 ASSAY THYROID STIM HORMONE: CPT | Performed by: INTERNAL MEDICINE

## 2018-04-30 PROCEDURE — 77067 SCR MAMMO BI INCL CAD: CPT

## 2018-04-30 PROCEDURE — 83036 HEMOGLOBIN GLYCOSYLATED A1C: CPT | Performed by: INTERNAL MEDICINE

## 2018-04-30 PROCEDURE — 80061 LIPID PANEL: CPT | Performed by: INTERNAL MEDICINE

## 2018-04-30 PROCEDURE — 80053 COMPREHEN METABOLIC PANEL: CPT | Performed by: INTERNAL MEDICINE

## 2018-04-30 PROCEDURE — 36415 COLL VENOUS BLD VENIPUNCTURE: CPT | Performed by: INTERNAL MEDICINE

## 2018-04-30 PROCEDURE — 85027 COMPLETE CBC AUTOMATED: CPT | Performed by: INTERNAL MEDICINE

## 2018-05-01 NOTE — PROGRESS NOTES
Patient is notified by me  She is newly diagnosed diabetic  Educated her about that  She will see me on this Thursday.  Will discuss this further with her during the office visit.

## 2018-05-03 ENCOUNTER — OFFICE VISIT (OUTPATIENT)
Dept: FAMILY MEDICINE | Facility: CLINIC | Age: 73
End: 2018-05-03
Payer: COMMERCIAL

## 2018-05-03 VITALS
BODY MASS INDEX: 30.78 KG/M2 | TEMPERATURE: 97 F | OXYGEN SATURATION: 96 % | HEART RATE: 67 BPM | WEIGHT: 215 LBS | DIASTOLIC BLOOD PRESSURE: 82 MMHG | SYSTOLIC BLOOD PRESSURE: 124 MMHG | HEIGHT: 70 IN

## 2018-05-03 DIAGNOSIS — I10 ESSENTIAL HYPERTENSION: ICD-10-CM

## 2018-05-03 DIAGNOSIS — E78.5 HYPERLIPIDEMIA, UNSPECIFIED HYPERLIPIDEMIA TYPE: ICD-10-CM

## 2018-05-03 DIAGNOSIS — E11.9 TYPE 2 DIABETES MELLITUS WITHOUT COMPLICATION, WITHOUT LONG-TERM CURRENT USE OF INSULIN (H): ICD-10-CM

## 2018-05-03 PROCEDURE — 82043 UR ALBUMIN QUANTITATIVE: CPT | Performed by: INTERNAL MEDICINE

## 2018-05-03 PROCEDURE — 99215 OFFICE O/P EST HI 40 MIN: CPT | Performed by: INTERNAL MEDICINE

## 2018-05-03 NOTE — PROGRESS NOTES
"  SUBJECTIVE:   Tonia Villavicencio is a 73 year old female who presents to clinic today for the following health issues:      Chief Complaint   Patient presents with     Diabetes     newly diagnosed diabetes        Patient reports she's stopped eating after her diagnosis  Patient is reading about lifestyle changes  She reports being active  No history of DM in family    Problem list and histories reviewed & adjusted, as indicated.  Additional history: as documented    Medications and Labs reviewed in EPIC    Reviewed and updated as needed this visit by clinical staff  Tobacco  Allergies  Meds       Reviewed and updated as needed this visit by Provider         ROS:  Constitutional, HEENT, cardiovascular, pulmonary, GI, , musculoskeletal, neuro, skin, endocrine and psych systems are negative, except as otherwise noted.    This document serves as a record of the services and decisions personally performed and made by Blanca Mariee MD. It was created on her behalf by Kandy Peters, a trained medical scribe. The creation of this document is based on the provider's statements to the medical scribe.  Kandy Peters 2:41 PM May 3, 2018    OBJECTIVE:     /82  Pulse 67  Temp 97  F (36.1  C) (Oral)  Ht 1.778 m (5' 10\")  Wt 97.5 kg (215 lb)  LMP  (LMP Unknown)  SpO2 96%  Breastfeeding? No  BMI 30.85 kg/m2  Body mass index is 30.85 kg/(m^2).    GENERAL: obese, alert and no distress  PSYCH: mentation appears normal, affect normal/bright    Diagnostic Test Results:  No results found for this or any previous visit (from the past 24 hour(s)).    Reviewed and discussed labs done on 04/30/18  ASSESSMENT/PLAN:     Tonia was seen today for diabetes.    Diagnoses and all orders for this visit:    Type 2 diabetes mellitus without complication, without long-term current use of insulin (H)  -     aspirin 81 MG EC tablet; Take 1 tablet (81 mg) by mouth daily  -     DIABETES EDUCATOR REFERRAL  -     blood glucose " monitoring (NO BRAND SPECIFIED) meter device kit; Use to test blood sugar one times daily or as directed.  -     blood glucose monitoring (NO BRAND SPECIFIED) test strip; Use to test blood sugars one times daily or as directed  -     blood glucose (NO BRAND SPECIFIED) lancets standard; Use to test blood sugar 1 times daily or as directed.  -     Albumin Random Urine Quantitative with Creat Ratio  I  spent an extensive amount of time educating patient about diabetes  Educated her about the diagnosis of diabetes  Educated her about healthy eating habits  Referred her to diabetic educator  She will make appointment at her conveneince  Advised her to bring BS readings to her appointment  Explained to patient that I usually prescribe metformin to patient's with a1c >7%  Patient wants do lifestyle changes for 3 months first  No history of DM in family  Explained to patient that all diabetic patients should:  1. Be on baby aspirin  2. Be on cholesterol-lowering medication  3. Have eye exams yearly  4. Have foot exam yearly (wash feet daily)  5. Be on ACE inhibitors or angiotensin receptor blocker  Patient agrees to start baby aspirin daily after her colonoscopy  Educated patient about BMI levels  Advised follow up in 3 months where a1c levels will be re-checked  Patient reported she is [place] and will do her best to not take medication  Lab Results   Component Value Date    A1C 7.9 04/30/2018     Essential hypertension  Doing well  Compliant with medication  Explained that we may change her to Lisinopril or Losartan later for BP control    Hyperlipidemia, unspecified hyperlipidemia type  Educated patient that she has hyperlipidemia  Declined statins  She will try lifestyle changes and come back in 3 months    Despite education she declined for statin and ace  She does not want medication for diabetes  Agreed to see diabetic educator.    Spent at least 15 minutes about discussing the importance of statin and  ace/arb  Patient Instructions   Urine test  today  Our goal is that your hemoglobin a1c is less than 7%  Schedule an appointment for diabetic educator at your earliest convenience  Check your blood sugar levels daily  Bring those readings to your appointment with your diabetic educator  Take baby aspirin daily after your colonoscopy  All diabetic patients should:  1. Be on baby aspirin  2. Be on cholesterol-lowering medication  3. Have eye exams yearly  4. Have foot exam yearly (wash feet daily)  5. Be on ACE inhibitors or angiotensin receptor blocker  Follow up in 3 months  Seek sooner medical attention if there is any worsening of symptoms or problems    The information in this document, created by the medical scribe for me, accurately reflects the services I personally performed and the decisions made by me. I have reviewed and approved this document for accuracy prior to leaving the patient care area.  May 3, 2018 3:08 PM    Blanca Mariee MD  [facility]

## 2018-05-03 NOTE — LETTER
[place] [address] [facility]  [address]  [place]  [address]  Tel: [phone]    May 7, 2018    Tonia Villavicencio  [address] [place] [facility] [address]        Dear Ms. Villavicencio,    This is to inform you regarding your test result.     Urine for Microalbumin is normal.       Sincerely,       Dr.Nasima Jaylene MD,FACP/daryn    Results for orders placed or performed in visit on 05/03/18   Albumin Random Urine Quantitative with Creat Ratio   Result Value Ref Range    Creatinine Urine 123 mg/dL    Albumin Urine mg/L 12 mg/L    Albumin Urine mg/g Cr 9.59 0 - 25 mg/g Cr           Enclosure: Lab Results

## 2018-05-03 NOTE — PATIENT INSTRUCTIONS
Urine test  today  Our goal is that your hemoglobin a1c is less than 7%  Schedule an appointment for diabetic educator at your earliest convenience  Check your blood sugar levels daily  Bring those readings to your appointment with your diabetic educator  Take baby aspirin daily after your colonoscopy  All diabetic patients should:  1. Be on baby aspirin  2. Be on cholesterol-lowering medication  3. Have eye exams yearly  4. Have foot exam yearly (wash feet daily)  5. Be on ACE inhibitors or angiotensin receptor blocker  Follow up in 3 months  Seek sooner medical attention if there is any worsening of symptoms or problems

## 2018-05-04 LAB
CREAT UR-MCNC: 123 MG/DL
MICROALBUMIN UR-MCNC: 12 MG/L
MICROALBUMIN/CREAT UR: 9.59 MG/G CR (ref 0–25)

## 2018-05-05 NOTE — PROGRESS NOTES
Please notify patient by sending following letter with copy of test results      Brisa Randall,    This is to inform you regarding your test result.    Urine for Microalbumin is normal.      Sincerely,      Dr.Nasima Jaylene MD,FACP

## 2018-05-09 ENCOUNTER — SURGERY (OUTPATIENT)
Age: 73
End: 2018-05-09

## 2018-05-09 ENCOUNTER — HOSPITAL ENCOUNTER (OUTPATIENT)
Facility: CLINIC | Age: 73
Discharge: HOME | End: 2018-05-09
Attending: COLON & RECTAL SURGERY | Admitting: COLON & RECTAL SURGERY
Payer: MEDICARE

## 2018-05-09 VITALS
DIASTOLIC BLOOD PRESSURE: 104 MMHG | SYSTOLIC BLOOD PRESSURE: 130 MMHG | RESPIRATION RATE: 23 BRPM | OXYGEN SATURATION: 95 %

## 2018-05-09 LAB — COLONOSCOPY: NORMAL

## 2018-05-09 PROCEDURE — 88305 TISSUE EXAM BY PATHOLOGIST: CPT | Performed by: COLON & RECTAL SURGERY

## 2018-05-09 PROCEDURE — 25000128 H RX IP 250 OP 636: Performed by: COLON & RECTAL SURGERY

## 2018-05-09 PROCEDURE — 88305 TISSUE EXAM BY PATHOLOGIST: CPT | Mod: 26 | Performed by: COLON & RECTAL SURGERY

## 2018-05-09 PROCEDURE — G0500 MOD SEDAT ENDO SERVICE >5YRS: HCPCS | Performed by: COLON & RECTAL SURGERY

## 2018-05-09 PROCEDURE — 45385 COLONOSCOPY W/LESION REMOVAL: CPT | Mod: PT | Performed by: COLON & RECTAL SURGERY

## 2018-05-09 RX ORDER — LIDOCAINE 40 MG/G
CREAM TOPICAL
Status: DISCONTINUED | OUTPATIENT
Start: 2018-05-09 | End: 2018-05-09 | Stop reason: HOSPADM

## 2018-05-09 RX ORDER — ONDANSETRON 2 MG/ML
4 INJECTION INTRAMUSCULAR; INTRAVENOUS
Status: DISCONTINUED | OUTPATIENT
Start: 2018-05-09 | End: 2018-05-09 | Stop reason: HOSPADM

## 2018-05-09 RX ORDER — FENTANYL CITRATE 50 UG/ML
INJECTION, SOLUTION INTRAMUSCULAR; INTRAVENOUS PRN
Status: DISCONTINUED | OUTPATIENT
Start: 2018-05-09 | End: 2018-05-09 | Stop reason: HOSPADM

## 2018-05-09 RX ADMIN — MIDAZOLAM 2 MG: 1 INJECTION INTRAMUSCULAR; INTRAVENOUS at 10:24

## 2018-05-09 RX ADMIN — FENTANYL CITRATE 100 MCG: 50 INJECTION, SOLUTION INTRAMUSCULAR; INTRAVENOUS at 10:24

## 2018-05-09 RX ADMIN — MIDAZOLAM 1 MG: 1 INJECTION INTRAMUSCULAR; INTRAVENOUS at 10:29

## 2018-05-09 NOTE — H&P
Colon & Rectal Surgery History and Physical  Pre-Endoscopy Procedure Note    History of Present Illness   I have been asked by Dr. Mariee to evaluate this 73 year old female for colorectal cancer screening.  She had a normal colonoscopy in August 2007 and currently denies any abdominal pain, weight loss, bleeding per rectum, or recent change in bowel habits.    Past Medical History  Diagnosis Date     Concussion      Depression      Elevated blood sugar      Fall     hit head     Heart murmur     negative echo     Hyperlipidemia      Hypertension      Knee osteoarthritis     injected x 3     Leg injury      Low back pain     post fall     Menopausal state      Migraine     excedrin overuse/ takes ultram      Nose bleeds      Obesity, unspecified      Poor dentition      Radius fracture      Serum calcium elevated     resolved      Vaginal delivery     x 2     Varicose vein      Vitamin D deficiency      Warts        Past Surgical History  Procedure Laterality Date     ARTHROSCOPY KNEE       GYN SURGERY      ovarian cyst removal and ovary        Medications  Medication Sig     amLODIPine (NORVASC) 2.5 MG tablet Take 1 tablet (2.5 mg) by mouth daily     aspirin 81 MG EC tablet Take 1 tablet (81 mg) by mouth daily     metoprolol tartrate (LOPRESSOR) 50 MG tablet TAKE 1 TABLET (50 MG) BY MOUTH 2 TIMES DAILY     STATIN NOT PRESCRIBED, INTENTIONAL, Please choose reason not prescribed, below     triamterene-hydrochlorothiazide (MAXZIDE) 75-50 MG per tablet Take 1 tablet by mouth daily       Allergies  Allergen Reactions     No Known Allergies         Family History   Family history includes DIABETES in her brother; HEART DISEASE in her mother; Prostate Cancer in her father.     Social History    . She reports that she has never smoked. She has never used smokeless tobacco. She reports that she does not drink alcohol or use illicit drugs.    Review of Systems   Constitutional:  No fever, weight change or fatigue.     Eyes:     No dry eyes or vision changes.   Ears/Nose/Throat/Neck:  No oral ulcers, sore throat or voice change.    Cardiovascular:   No palpitations, syncope, angina or edema.   Respiratory:    No chest pain, excessive sleepiness, shortness of breath or hemoptysis.    Gastrointestinal:   No abdominal pain, nausea, vomiting, diarrhea or heartburn.    Genitourinary:   No dysuria, hematuria, urinary retention or urinary frequency.   Musculoskeletal:  No joint swelling or arthralgias.    Dermatologic:  No skin rash or other skin changes.   Neurologic:    No focal weakness or numbness. No neuropathy.   Psychiatric:    No depression, anxiety, suicidal ideation, or paranoid ideation.   Endocrine:   No cold or heat intolerance, polydipsia, hirsutism, change in libido, or flushing.   Hematology/Lymphatic:  No bleeding or lymphadenopathy.    Allergy/Immunology:  No rhinitis or hives.     Physical Exam   Vitals:  /81, RR 16, HR 64, SpO2 98 %, not currently breastfeeding.    General:  Alert and oriented to person, place and time   Airway: Normal oropharyngeal airway and neck mobility   Lungs:  Clear bilaterally   Heart:  Regular rate and rhythm   Abdomen: Soft, NT, ND, no masses   Rectal:  Perianal skin without excoriation, hemorrhoidal disease or anal fissure        Digital rectal examination reveals normal sphincter tone without masses    ASA Grade: II (mild systemic disease)    Impression: Cleared for use of conscious sedation for colorectal cancer screening    Plan: Proceed with colonoscopy     Qian Houston MD  [facility]  [phone]

## 2018-05-10 ENCOUNTER — ALLIED HEALTH/NURSE VISIT (OUTPATIENT)
Dept: EDUCATION SERVICES | Facility: CLINIC | Age: 73
End: 2018-05-10
Payer: COMMERCIAL

## 2018-05-10 DIAGNOSIS — E11.9 TYPE 2 DIABETES MELLITUS WITHOUT COMPLICATION, WITHOUT LONG-TERM CURRENT USE OF INSULIN (H): Primary | ICD-10-CM

## 2018-05-10 LAB — COPATH REPORT: NORMAL

## 2018-05-10 PROCEDURE — G0108 DIAB MANAGE TRN  PER INDIV: HCPCS

## 2018-05-10 NOTE — PATIENT INSTRUCTIONS
My Diabetes Care Goals:    Monitoring: I will check my blood sugar once daily starting today.     Follow up:  Call ([phone]), e-mail ([email or web address]), or send [email or web address] message with questions, concerns or if follow-up is needed.     Bring blood glucose meter and logbook with you to all doctor and follow-up appointments.     [facility]:  For Your Diabetes Education and Nutrition Appointments Call:  [phone]   For Diabetes Education or Nutrition Related Questions:   Phone: [phone]  E-mail: [email or web address]  Fax: [phone]   If you need a medication refill please contact your pharmacy. Please allow 3 business days for your refills to be completed.    Instructions for emailing the Diabetes Educators    If you need to communicate a non-urgent message to a Diabetes Educator via email, please send to [email or web address].    Please follow the following email guidelines:    Subject line: Secure: your clinic name (example: Secure: [facility])  In the email please include: First name, middle initial, last name and date of birth.    We will be in touch with you within one (1) business day.

## 2018-05-10 NOTE — PROGRESS NOTES
Diabetes Self Management Training: Initial Assessment Visit for Newly Diagnosed Patients (Complete AADE Goals Flowsheet)    Tonia Villavicencio presents today for education related to Type 2 diabetes.    She is accompanied by self    Patient's diabetes management related comments/concerns: Brought some diabetes books today (The End of Diabetes) and reports she will not check her blood sugar long term and will rely on God to help fix her pancrease.     Patient's emotional response to diabetes: expresses readiness to learn and concern for health and well-being    Patient would like this visit to be focused around the following diabetes-related behaviors and goals: Healthy Eating and Monitoring    ASSESSMENT:  Patient Problem List and Family Medical History reviewed for relevant medical history, current medical status, and diabetes risk factors.    Current Diabetes Management per Patient:  Taking diabetes medications? No - per chart will try lifestyle and diet for 3 months first    Past Diabetes Education: Newly diagnosed    Patient glucose self monitoring as follows: BG meter taught today.   BG meter: One Touch Ultra 2 meter  BG results: not available     BG values are: unable to assess  Patient's most recent   Lab Results   Component Value Date    A1C 7.9 04/30/2018    is not meeting goal of <7.0    Nutrition:  Patient skips lunch regularly. Likes celery, carrots, tomatoes, lettuce for vegetables. Tries to reduce her salad dressing and wants to reduce her bread intake.     Breakfast - 1 egg  Lunch - skips sometimes if working all day or a piece of toast at home   Dinner - fish (walleye) with vegetables   Snacks - sunflower seeds (unsalted) and almonds    Beverages: regular soda but reports she will stop drinking soda    Cultural/Jainism diet restrictions: not a big fan of pork    Biggest Challenge to Healthy Eating: knowing what to eat    Physical Activity:    No structured exercise regimen.     Diabetes Risk  "Factors:  family history, age over 45 years, hypertension, hyperlipidemia, overweight/obesity and inactivity    Diabetes Complications:  Acute Complications: At risk for hypoglycemia? no    Vitals:  LMP  (LMP Unknown)  Estimated body mass index is 30.85 kg/(m^2) as calculated from the following:    Height as of 5/3/18: 1.778 m (5' 10\").    Weight as of 5/3/18: 97.5 kg (215 lb).   Last 3 BP:   BP Readings from Last 3 Encounters:   05/09/18 (!) 130/104   05/03/18 124/82   03/05/18 (!) 166/94       History   Smoking Status     Never Smoker   Smokeless Tobacco     Never Used       Labs:  Lab Results   Component Value Date    A1C 7.9 04/30/2018     Lab Results   Component Value Date     04/30/2018     Lab Results   Component Value Date     04/30/2018     HDL Cholesterol   Date Value Ref Range Status   04/30/2018 47 (L) >49 mg/dL Final   ]  GFR Estimate   Date Value Ref Range Status   04/30/2018 55 (L) >60 mL/min/1.7m2 Final     Comment:     Non  GFR Calc     GFR Estimate If Black   Date Value Ref Range Status   04/30/2018 66 >60 mL/min/1.7m2 Final     Comment:      GFR Calc     Lab Results   Component Value Date    CR 1.00 04/30/2018     No results found for: MICROALBUMIN    Socio/Economic Considerations:    Support system: family but does not want to tell them about this dx.     Health Beliefs and Attitudes:   Patient Activation Measure Survey Score:  No flowsheet data found.    Stage of Change: PREPARATION (Decided to change - considering how)      Diabetes knowledge and skills assessment:     Patient is knowledgeable in diabetes management concepts related to: Healthy Eating    Patient needs further education on the following diabetes management concepts: Healthy Eating, Being Active, Monitoring, Taking Medication, Problem Solving, Reducing Risks and Healthy Coping    Barriers to Learning Assessment: No Barriers identified    Based on learning assessment above, most " appropriate setting for further diabetes education would be: Group class or Individual setting.    INTERVENTION:   Education provided today on:  AADE Self-Care Behaviors:  Healthy Eating: carbohydrate counting, consistency in amount, composition, and timing of food intake, weight reduction, portion control and label reading  Being Active: relationship to blood glucose and heart health recommendation of at least 30 minutes per day or 150 min per week.   Monitoring: purpose, proper technique, log and interpret results, individual blood glucose targets, frequency of monitoring and proper sharps disposal  Discussed that sometimes medication is needed but will see how her diet/lifestyle interventions go the next few months.   Problem Solving: high blood glucose - causes, signs/symptoms, treatment and prevention  Patient was instructed on One Touch Ultra 2 meter and was able to provide an accurate return demonstration. Patient's blood glucose reading today was 175 mg/dL.  Educated on pathophysiology of diabetes and what it means.     Opportunities for ongoing education and support in diabetes-self management were discussed.    Pt verbalized understanding of concepts discussed and recommendations provided today.       Education Materials Provided:  [facility] Understanding Diabetes Booklet and BG Log Sheet    PLAN:  See Patient Instructions for co-developed, patient-stated behavior change goals.  Meal Plan Recommendation: eat 3 meals a day and Aim for 2-3 carb servings at meals and 1-2 carb servings at snacks  Exercise / activity plan: start more structured exercise regimen  Keep a blood glucose record for next visit.  AVS printed and provided to patient today.    FOLLOW-UP:  Recommend f/u in 1-2 months to review blood sugars and continue new dx education. She will call to schedule. Provided with number  Chart routed to referring provider.    Ongoing plan for education and support: Written resources (magazines, books, etc.),  Follow-up visit with diabetes educator in 4-8 weeks and Follow-up with primary care provider    TAMMY Gonsalez CDE    Time Spent: 60 minutes  Encounter Type: Individual    Any diabetes medication dose changes were made via the CDE Protocol and Collaborative Practice Agreement with the patient's referring provider. A copy of this encounter was shared with the provider.

## 2018-05-10 NOTE — MR AVS SNAPSHOT
After Visit Summary   5/10/2018    Tonia Villavicencio    MRN: [id number]           Patient Information     Date Of Birth          1945        Visit Information        Provider Department      5/10/2018 9:00 AM [facility] RESOURCE [facility] [place] [facility]        Today's Diagnoses     Type 2 diabetes mellitus without complication, without long-term current use of insulin (H)    -  1      Care Instructions    My Diabetes Care Goals:    Monitoring: I will check my blood sugar once daily starting today.     Follow up:  Call ([phone]), e-mail ([email or web address]), or send [employer] message with questions, concerns or if follow-up is needed.     Bring blood glucose meter and logbook with you to all doctor and follow-up appointments.     [facility] for the [facility] Area:  For Your Diabetes Education and Nutrition Appointments Call:  [phone]   For Diabetes Education or Nutrition Related Questions:   Phone: [phone]  E-mail: [email or web address]  Fax: [phone]   If you need a medication refill please contact your pharmacy. Please allow 3 business days for your refills to be completed.    Instructions for emailing the Diabetes Educators    If you need to communicate a non-urgent message to a Diabetes Educator via email, please send to [email or web address].    Please follow the following email guidelines:    Subject line: Secure: your clinic name (example: Secure: [facility])  In the email please include: First name, middle initial, last name and date of birth.    We will be in touch with you within one (1) business day.             Follow-ups after your visit        Your next 10 appointments already scheduled     May 11, 2018  9:30 AM CDT   PHYSICAL with Blanca Mariee MD   [facility] ([facility])    [address] [facility] [address] [phone]              Who to contact     If you  "have questions or need follow up information about today's clinic visit or your schedule please contact [facility] directly at [phone].  Normal or non-critical lab and imaging results will be communicated to you by MyChart, letter or phone within 4 business days after the clinic has received the results. If you do not hear from us within 7 days, please contact the clinic through [employer] or phone. If you have a critical or abnormal lab result, we will notify you by phone as soon as possible.  Submit refill requests through [employer] or call your pharmacy and they will forward the refill request to us. Please allow 3 business days for your refill to be completed.          Additional Information About Your Visit        [employer]HharSmartPill Information     [employer] lets you send messages to your doctor, view your test results, renew your prescriptions, schedule appointments and more. To sign up, go to [email or web address] . Click on \"Log in\" on the left side of the screen, which will take you to the Welcome page. Then click on \"Sign up Now\" on the right side of the page.     You will be asked to enter the access code listed below, as well as some personal information. Please follow the directions to create your username and password.     Your access code is: [email or web address]  Expires: 6/3/2018 11:33 AM     Your access code will  in 90 days. If you need help or a new code, please call your [place] clinic or [phone].        Care EveryWhere ID     This is your Care EveryWhere ID. This could be used by other organizations to access your [place] medical records  [id number]        Your Vitals Were     Last Period                   (LMP Unknown)            Blood Pressure from Last 3 Encounters:   18 (!) 130/104   18 124/82   18 (!) 166/94    Weight from Last 3 Encounters:   18 97.5 kg (215 lb)   18 97.5 kg (215 lb)   17 101.3 kg (223 lb 6.4 oz)              Today, " you had the following     No orders found for display       Primary Care Provider Office Phone # Fax #    Blanca Mariee [phone] [address] [address]        Equal Access to Services     SOL GU : Hadii aad ku hadrhinao Corryali, waaxda luqadaha, qaybta kaalmada adeegyada, merry maria luzin divinan tiannashalom edencarissa serrano . So [facility] [phone].    ATENCIÓN: Si habla español, tiene a prdahan disposición servicios gratuitos de asistencia lingüística. Llame al [phone].    We comply with applicable federal civil rights laws and [place] laws. We do not discriminate on the basis of race, color, national origin, age, disability, sex, sexual orientation, or gender identity.            Thank you!     Thank you for choosing [facility] [place] [facility]  for your care. Our goal is always to provide you with excellent care. Hearing back from our patients is one way we can continue to improve our services. Please take a few minutes to complete the written survey that you may receive in the mail after your visit with us. Thank you!             Your Updated Medication List - Protect others around you: Learn how to safely use, store and throw away your medicines at www.disposemymeds.org.          This list is accurate as of 5/10/18 10:02 AM.  Always use your most recent med list.                   Brand Name Dispense Instructions for use Diagnosis    ACE/ARB/ARNI NOT PRESCRIBED (INTENTIONAL)      Please choose reason not prescribed, below    Type 2 diabetes mellitus without complication, without long-term current use of insulin (H)       amLODIPine 2.5 MG tablet    NORVASC    30 tablet    Take 1 tablet (2.5 mg) by mouth daily    Benign essential hypertension       aspirin 81 MG EC tablet     90 tablet    Take 1 tablet (81 mg) by mouth daily    Type 2 diabetes mellitus without complication, without long-term current use of insulin (H)       blood glucose lancets  standard    no brand specified    100 each    Use to test blood sugar 1 times daily or as directed.    Type 2 diabetes mellitus without complication, without long-term current use of insulin (H)       blood glucose monitoring meter device kit    no brand specified    1 kit    Use to test blood sugar one times daily or as directed.    Type 2 diabetes mellitus without complication, without long-term current use of insulin (H)       blood glucose monitoring test strip    no brand specified    100 strip    Use to test blood sugars one times daily or as directed    Type 2 diabetes mellitus without complication, without long-term current use of insulin (H)       metoprolol tartrate 50 MG tablet    LOPRESSOR    180 tablet    TAKE 1 TABLET (50 MG) BY MOUTH 2 TIMES DAILY    Benign essential hypertension       STATIN NOT PRESCRIBED (INTENTIONAL)      Please choose reason not prescribed, below    Type 2 diabetes mellitus without complication, without long-term current use of insulin (H)       triamterene-hydrochlorothiazide 75-50 MG per tablet    MAXZIDE    90 tablet    Take 1 tablet by mouth daily    Benign essential hypertension

## 2018-05-11 ENCOUNTER — OFFICE VISIT (OUTPATIENT)
Dept: FAMILY MEDICINE | Facility: CLINIC | Age: 73
End: 2018-05-11
Payer: COMMERCIAL

## 2018-05-11 VITALS
BODY MASS INDEX: 30.06 KG/M2 | HEIGHT: 70 IN | OXYGEN SATURATION: 99 % | TEMPERATURE: 96.9 F | WEIGHT: 210 LBS | DIASTOLIC BLOOD PRESSURE: 86 MMHG | SYSTOLIC BLOOD PRESSURE: 128 MMHG | HEART RATE: 57 BPM

## 2018-05-11 DIAGNOSIS — I10 BENIGN ESSENTIAL HYPERTENSION: ICD-10-CM

## 2018-05-11 DIAGNOSIS — Z00.00 ROUTINE HISTORY AND PHYSICAL EXAMINATION OF ADULT: Primary | ICD-10-CM

## 2018-05-11 DIAGNOSIS — D12.6 SERRATED ADENOMA OF COLON: ICD-10-CM

## 2018-05-11 DIAGNOSIS — E11.9 TYPE 2 DIABETES MELLITUS WITHOUT COMPLICATION, WITHOUT LONG-TERM CURRENT USE OF INSULIN (H): ICD-10-CM

## 2018-05-11 DIAGNOSIS — M65.342 TRIGGER RING FINGER OF LEFT HAND: ICD-10-CM

## 2018-05-11 PROCEDURE — 99397 PER PM REEVAL EST PAT 65+ YR: CPT | Mod: 25 | Performed by: INTERNAL MEDICINE

## 2018-05-11 PROCEDURE — 99207 C FOOT EXAM  NO CHARGE: CPT | Mod: 25 | Performed by: INTERNAL MEDICINE

## 2018-05-11 PROCEDURE — 99213 OFFICE O/P EST LOW 20 MIN: CPT | Mod: 25 | Performed by: INTERNAL MEDICINE

## 2018-05-11 RX ORDER — LISINOPRIL 10 MG/1
10 TABLET ORAL DAILY
Qty: 90 TABLET | Refills: 1 | Status: SHIPPED | OUTPATIENT
Start: 2018-05-11 | End: 2018-11-16

## 2018-05-11 RX ORDER — AMLODIPINE BESYLATE 2.5 MG/1
2.5 TABLET ORAL DAILY
Qty: 30 TABLET | Refills: 1 | Status: CANCELLED | OUTPATIENT
Start: 2018-05-11

## 2018-05-11 NOTE — MR AVS SNAPSHOT
After Visit Summary   5/11/2018    Tonia Villavicencio    MRN: [id number]           Patient Information     Date Of Birth          1945        Visit Information        Provider Department      5/11/2018 9:30 AM Blanca Mariee MD [facility]        Today's Diagnoses     Routine history and physical examination of adult    -  1    Benign essential hypertension        Type 2 diabetes mellitus without complication, without long-term current use of insulin (H)        Serrated adenoma of colon        Trigger ring finger of left hand          Care Instructions      Preventive Health Recommendations  Female Ages 65 +    Yearly exam:     See your health care provider every year in order to  o Review health changes.   o Discuss preventive care.    o Review your medicines if your doctor has prescribed any.      You no longer need a yearly Pap test unless you've had an abnormal Pap test in the past 10 years. If you have vaginal symptoms, such as bleeding or discharge, be sure to talk with your provider about a Pap test.      Every 1 to 2 years, have a mammogram.  If you are over 69, talk with your health care provider about whether or not you want to continue having screening mammograms.      Every 10 years, have a colonoscopy. Or, have a yearly FIT test (stool test). These exams will check for colon cancer.       Have a cholesterol test every 5 years, or more often if your doctor advises it.       Have a diabetes test (fasting glucose) every three years. If you are at risk for diabetes, you should have this test more often.       At age 65, have a bone density scan (DEXA) to check for osteoporosis (brittle bone disease).    Shots:    Get a flu shot each year.    Get a tetanus shot every 10 years.    Talk to your doctor about your pneumonia vaccines. There are now two you should receive - Pneumovax (PPSV 23) and Prevnar (PCV 13).    Talk to your doctor about the shingles vaccine.    Talk to your  doctor about the hepatitis B vaccine.    Nutrition:     Eat at least 5 servings of fruits and vegetables each day.      Eat whole-grain bread, whole-wheat pasta and brown rice instead of white grains and rice.      Talk to your provider about Calcium and Vitamin D.     Lifestyle    Exercise at least 150 minutes a week (30 minutes a day, 5 days a week). This will help you control your weight and prevent disease.      Limit alcohol to one drink per day.      No smoking.       Wear sunscreen to prevent skin cancer.       See your dentist twice a year for an exam and cleaning.      See your eye doctor every 1 to 2 years to screen for conditions such as glaucoma, macular degeneration, cataracts, etc       There is this is a new shingles vaccine available called shingrex  It is a series of 2 shots 6 months apart.  Considered more than 90% effective.  Please check with your insurance company about the coverage  Stop at pharmacy to get the vaccine  Follow up in 3 months  Discontinue amlodipine  Start lisinopril 10 mg daily  This medication is good for patients with diabetes  Sometimes this can cause dry cough and if that happens then let me know so we can switch to losartan  Make appointment with orthopedics for trigger finger    What is Trigger Finger?  Trigger finger is an inflammation of tissue inside your finger or thumb. It is also called tenosynovitis (ten-oh-sin-oh-VY-tis). Tendons (cordlike fibers that attach muscle to bone and allow you to bend the joints) become swollen. So does the synovium (a slick membrane that allows the tendons to move easily). This makes it hard to straighten the finger or thumb.    Causes  Repeated use of a tool with strong gripping, such as a drill or wrench, can irritate and inflame the tendons and the synovium. It is also more common in certain medical conditions, such as rheumatoid arthritis, gout, and diabetes. But often the cause of trigger finger is unknown.  Inside your  finger  Tendons connect muscles in your forearm to the bones in your fingers. The tendons in each finger are surrounded by a protective tendon sheath. This sheath is lined with synovium, which produces a fluid that allows the tendons to slide easily when you bend and straighten the finger. If a tendon is irritated, it becomes inflamed.  When a tendon is inflamed  When a tendon is inflamed, it causes the lining of the tendon sheath to swell and thicken. Or the tendon itself may thicken. Then the sheath pinches the tendon. The tendon can then no longer slide easily inside the sheath. When you straighten your finger, the tendon sticks or  locks  as it tries to squeeze back through the sheath.    Symptoms  The first sign of trigger finger may be pain where the finger or thumb joins the palm. You may also notice some swelling. As the tendon becomes more inflamed, the finger may start to catch when you try to straighten or bend it. When the locked tendon releases, the finger jumps, as if you were releasing the trigger of a gun. This further irritates the tendon. It may set up a cycle of catching and swelling.   Date Last Reviewed: 1/1/2018 2000-2017 [employer]. [address] [place] [address]. All rights reserved. This information is not intended as a substitute for professional medical care. Always follow your healthcare professional's instructions.                Follow-ups after your visit        Additional Services     ORTHO  REFERRAL       [facility] is referring you to the Orthopedic  Services at [facility].       The  Representative will assist you in the coordination of your Orthopedic and Musculoskeletal Care as prescribed by your physician.    The  Representative will call you within 1 business day to help schedule your appointment, or you may contact the  Representative at:    All areas ~ [phone]  [phone]     Type of Referral : Non Surgical       Timeframe requested: 3 - 5 days    Coverage of these services is subject to the terms and limitations of your health insurance plan.  Please call member services at your health plan with any benefit or coverage questions.      If X-rays, CT or MRI's have been performed, please contact the facility where they were done to arrange for , prior to your scheduled appointment.  Please bring this referral request to your appointment and present it to your specialist.                  Who to contact     If you have questions or need follow up information about today's clinic visit or your schedule please contact [facility] directly at [phone].  Normal or non-critical lab and imaging results will be communicated to you by MyChart, letter or phone within 4 business days after the clinic has received the results. If you do not hear from us within 7 days, please contact the clinic through [employer] or phone. If you have a critical or abnormal lab result, we will notify you by phone as soon as possible.  Submit refill requests through [employer] or call your pharmacy and they will forward the refill request to us. Please allow 3 business days for your refill to be completed.          Additional Information About Your Visit        MilabraharFamilyID Information     [employer] lets you send messages to your doctor, view your test results, renew your prescriptions, schedule appointments and more. To sign up, go to [email or web address] . Click on \"Log in\" on the left side of the screen, which will take you to the Welcome page. Then click on \"Sign up Now\" on the right side of the page.     You will be asked to enter the access code listed below, as well as some personal information. Please follow the directions to create your username and password.     Your access code is: [email or web address]  Expires: 6/3/2018 11:33 AM     Your access code will  in 90 days. If you need help " "or a new code, please call your [place] clinic or [phone].        Care EveryWhere ID     This is your Care EveryWhere ID. This could be used by other organizations to access your [place] medical records  [id number]        Your Vitals Were     Pulse Temperature Height Last Period Pulse Oximetry BMI (Body Mass Index)    57 96.9  F (36.1  C) (Oral) 5' 9.5\" (1.765 m) (LMP Unknown) 99% 30.57 kg/m2       Blood Pressure from Last 3 Encounters:   05/11/18 128/86   05/09/18 (!) 130/104   05/03/18 124/82    Weight from Last 3 Encounters:   05/11/18 210 lb (95.3 kg)   05/03/18 215 lb (97.5 kg)   03/05/18 215 lb (97.5 kg)              We Performed the Following     C FOOT EXAM  NO CHARGE     ORTHO  REFERRAL          Today's Medication Changes          These changes are accurate as of 5/11/18 10:10 AM.  If you have any questions, ask your nurse or doctor.               Start taking these medicines.        Dose/Directions    lisinopril 10 MG tablet   Commonly known as:  PRINIVIL/ZESTRIL   Used for:  Benign essential hypertension, Type 2 diabetes mellitus without complication, without long-term current use of insulin (H)        Dose:  10 mg   Take 1 tablet (10 mg) by mouth daily   Quantity:  90 tablet   Refills:  1         Stop taking these medicines if you haven't already. Please contact your care team if you have questions.     amLODIPine 2.5 MG tablet   Commonly known as:  NORVASC                Where to get your medicines      These medications were sent to [facility]/pharmacy #[id number] - [address] AT CORNER OF [address] [place] [address]     Phone:  [phone]     lisinopril 10 MG tablet                Primary Care Provider Office Phone # Fax #    Blanca Mariee [phone] [address] [place] [address]        Equal Access to Services     SOL GU AH: Sonia Lau, gunnar richardson, qaybta " merry vazquezshalom serrano ah. So [facility] [phone].    ATENCIÓN: Si justine patrick, tiene a pradhan disposición servicios gratuitos de asistencia lingüística. Llelena al [phone].    We comply with applicable federal civil rights laws and [place] laws. We do not discriminate on the basis of race, color, national origin, age, disability, sex, sexual orientation, or gender identity.            Thank you!     Thank you for choosing [facility]  for your care. Our goal is always to provide you with excellent care. Hearing back from our patients is one way we can continue to improve our services. Please take a few minutes to complete the written survey that you may receive in the mail after your visit with us. Thank you!             Your Updated Medication List - Protect others around you: Learn how to safely use, store and throw away your medicines at www.disposemymeds.org.          This list is accurate as of 5/11/18 10:10 AM.  Always use your most recent med list.                   Brand Name Dispense Instructions for use Diagnosis    aspirin 81 MG EC tablet     90 tablet    Take 1 tablet (81 mg) by mouth daily    Type 2 diabetes mellitus without complication, without long-term current use of insulin (H)       blood glucose lancets standard    no brand specified    100 each    Use to test blood sugar 1 times daily or as directed.    Type 2 diabetes mellitus without complication, without long-term current use of insulin (H)       blood glucose monitoring meter device kit    no brand specified    1 kit    Use to test blood sugar one times daily or as directed.    Type 2 diabetes mellitus without complication, without long-term current use of insulin (H)       blood glucose monitoring test strip    no brand specified    100 strip    Use to test blood sugars one times daily or as directed    Type 2 diabetes mellitus without complication, without long-term current use of insulin  (H)       lisinopril 10 MG tablet    PRINIVIL/ZESTRIL    90 tablet    Take 1 tablet (10 mg) by mouth daily    Benign essential hypertension, Type 2 diabetes mellitus without complication, without long-term current use of insulin (H)       metoprolol tartrate 50 MG tablet    LOPRESSOR    180 tablet    TAKE 1 TABLET (50 MG) BY MOUTH 2 TIMES DAILY    Benign essential hypertension       STATIN NOT PRESCRIBED (INTENTIONAL)      Please choose reason not prescribed, below    Type 2 diabetes mellitus without complication, without long-term current use of insulin (H)       triamterene-hydrochlorothiazide 75-50 MG per tablet    MAXZIDE    90 tablet    Take 1 tablet by mouth daily    Benign essential hypertension
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
## 2018-05-11 NOTE — PATIENT INSTRUCTIONS
Preventive Health Recommendations  Female Ages 65 +    Yearly exam:     See your health care provider every year in order to  o Review health changes.   o Discuss preventive care.    o Review your medicines if your doctor has prescribed any.      You no longer need a yearly Pap test unless you've had an abnormal Pap test in the past 10 years. If you have vaginal symptoms, such as bleeding or discharge, be sure to talk with your provider about a Pap test.      Every 1 to 2 years, have a mammogram.  If you are over 69, talk with your health care provider about whether or not you want to continue having screening mammograms.      Every 10 years, have a colonoscopy. Or, have a yearly FIT test (stool test). These exams will check for colon cancer.       Have a cholesterol test every 5 years, or more often if your doctor advises it.       Have a diabetes test (fasting glucose) every three years. If you are at risk for diabetes, you should have this test more often.       At age 65, have a bone density scan (DEXA) to check for osteoporosis (brittle bone disease).    Shots:    Get a flu shot each year.    Get a tetanus shot every 10 years.    Talk to your doctor about your pneumonia vaccines. There are now two you should receive - Pneumovax (PPSV 23) and Prevnar (PCV 13).    Talk to your doctor about the shingles vaccine.    Talk to your doctor about the hepatitis B vaccine.    Nutrition:     Eat at least 5 servings of fruits and vegetables each day.      Eat whole-grain bread, whole-wheat pasta and brown rice instead of white grains and rice.      Talk to your provider about Calcium and Vitamin D.     Lifestyle    Exercise at least 150 minutes a week (30 minutes a day, 5 days a week). This will help you control your weight and prevent disease.      Limit alcohol to one drink per day.      No smoking.       Wear sunscreen to prevent skin cancer.       See your dentist twice a year for an exam and cleaning.      See your  eye doctor every 1 to 2 years to screen for conditions such as glaucoma, macular degeneration, cataracts, etc       There is this is a new shingles vaccine available called shingrex  It is a series of 2 shots 6 months apart.  Considered more than 90% effective.  Please check with your insurance company about the coverage  Stop at pharmacy to get the vaccine  Follow up in 3 months  Discontinue amlodipine  Start lisinopril 10 mg daily  This medication is good for patients with diabetes  Sometimes this can cause dry cough and if that happens then let me know so we can switch to losartan  Make appointment with orthopedics for trigger finger    What is Trigger Finger?  Trigger finger is an inflammation of tissue inside your finger or thumb. It is also called tenosynovitis (ten-oh-sin-oh-VY-tis). Tendons (cordlike fibers that attach muscle to bone and allow you to bend the joints) become swollen. So does the synovium (a slick membrane that allows the tendons to move easily). This makes it hard to straighten the finger or thumb.    Causes  Repeated use of a tool with strong gripping, such as a drill or wrench, can irritate and inflame the tendons and the synovium. It is also more common in certain medical conditions, such as rheumatoid arthritis, gout, and diabetes. But often the cause of trigger finger is unknown.  Inside your finger  Tendons connect muscles in your forearm to the bones in your fingers. The tendons in each finger are surrounded by a protective tendon sheath. This sheath is lined with synovium, which produces a fluid that allows the tendons to slide easily when you bend and straighten the finger. If a tendon is irritated, it becomes inflamed.  When a tendon is inflamed  When a tendon is inflamed, it causes the lining of the tendon sheath to swell and thicken. Or the tendon itself may thicken. Then the sheath pinches the tendon. The tendon can then no longer slide easily inside the sheath. When you  straighten your finger, the tendon sticks or  locks  as it tries to squeeze back through the sheath.    Symptoms  The first sign of trigger finger may be pain where the finger or thumb joins the palm. You may also notice some swelling. As the tendon becomes more inflamed, the finger may start to catch when you try to straighten or bend it. When the locked tendon releases, the finger jumps, as if you were releasing the trigger of a gun. This further irritates the tendon. It may set up a cycle of catching and swelling.   Date Last Reviewed: 1/1/2018 2000-2017 [employer]. [address] [place] [address], [place] [address]. All rights reserved. This information is not intended as a substitute for professional medical care. Always follow your healthcare professional's instructions.

## 2018-05-22 ENCOUNTER — TELEPHONE (OUTPATIENT)
Dept: FAMILY MEDICINE | Facility: CLINIC | Age: 73
End: 2018-05-22

## 2018-05-22 NOTE — TELEPHONE ENCOUNTER
"States she ate at a [place] restaurant on Saturday.  Had vomiting during the night which resolved.  The following day, developed diarrhea (couple episodes), then one loose stool Monday and today.  No weakness or dizziness.  Slight abdominal pain with expulsion; not significant.  No fever. \"Now I'm afraid to eat\".  Had egg and toast today. Seemed to tolerate.      Advised bland, soft diet as tolerated. Avoid spicy/greasy/high fiber foods for 2 more days.  Drink plenty of water to maintain hydration.  Should be urinating every 3-4 hours if she is drinking enough.  Call with any worsening/concerns.  She agrees.  Iraida Mcmanus RN    "

## 2018-05-22 NOTE — TELEPHONE ENCOUNTER
Reason for call:  Patient reporting a symptom    Symptom or request: Diarrhea     Duration (how long have symptoms been present): 3 days    Have you been treated for this before? No    Additional comments: Pt believes she had food poisoning ( she was vomiting as well but not anymore)   Pt refused OV       Phone Number patient can be reached at:  Home number on file [phone] (home)    Best Time:  any    Can we leave a detailed message on this number:  NO     Call taken on 5/22/2018 at 1:00 PM by Vi Carmona

## 2018-05-24 ENCOUNTER — TELEPHONE (OUTPATIENT)
Dept: FAMILY MEDICINE | Facility: CLINIC | Age: 73
End: 2018-05-24

## 2018-05-24 NOTE — TELEPHONE ENCOUNTER
Reason for call:  Patient reporting a symptom    Symptom or request: Vomit, Diarrhea    Duration (how long have symptoms been present): started last sat    Have you been treated for this before? No    Additional comments: call to discuss    Phone Number patient can be reached at:  Home number on file [phone] (home)    Best Time:  anytime    Can we leave a detailed message on this number:  No    Call taken on 5/24/2018 at 8:56 AM by Carlos Perez

## 2018-05-24 NOTE — TELEPHONE ENCOUNTER
I called and spoke with patient.   Reports of diarrhea and vomiting since Saturday.   Patient reports of eating [place] food Friday Saturday had diarrhea and vomited.   Denies fever  Denies abdominal pain  Denies blood in stool  No dizziness  Slight headache  Does report of some fatigue/weakness  Reports of some decreased appetite  States that stool is yellow in color but does not have a strong odor.    She took two charcoal tablets last night    Advised: clear liquids and then progress to clear soups, toast, soda crackers, rice, apple sauce. Water for rehydration.   Avoid dairy, citrus, spicy foods, avoid raw fruits and veggies. Can try imodium for diarrhea. Tylenol for fevers/aches    Advised that if diarrhea/vomiting does not slow within next 24 hours, or if she develops fever, severe abdominal pain or starts having diarrhea every 30-60 min > than 6 hours to let us know.   Advised that she let us know how she is doing tomorrow morning before long weekend.     Will sarah this as call patient and postpone until tomorrow to reach patient.      Prabha Singh RN

## 2018-05-25 ENCOUNTER — OFFICE VISIT (OUTPATIENT)
Dept: FAMILY MEDICINE | Facility: CLINIC | Age: 73
End: 2018-05-25
Payer: COMMERCIAL

## 2018-05-25 VITALS
SYSTOLIC BLOOD PRESSURE: 112 MMHG | BODY MASS INDEX: 29.35 KG/M2 | HEIGHT: 70 IN | HEART RATE: 56 BPM | TEMPERATURE: 97 F | OXYGEN SATURATION: 97 % | WEIGHT: 205 LBS | DIASTOLIC BLOOD PRESSURE: 70 MMHG

## 2018-05-25 DIAGNOSIS — K52.9 GASTROENTERITIS: Primary | ICD-10-CM

## 2018-05-25 DIAGNOSIS — R11.0 NAUSEA: ICD-10-CM

## 2018-05-25 PROCEDURE — 99213 OFFICE O/P EST LOW 20 MIN: CPT | Performed by: INTERNAL MEDICINE

## 2018-05-25 RX ORDER — ONDANSETRON 4 MG/1
4-8 TABLET, ORALLY DISINTEGRATING ORAL EVERY 8 HOURS PRN
Qty: 20 TABLET | Refills: 1 | Status: SHIPPED | OUTPATIENT
Start: 2018-05-25

## 2018-05-25 NOTE — PROGRESS NOTES
"  SUBJECTIVE:   Tonia Villavicencio is a 73 year old female who presents to clinic today for the following health issues:    Diarrhea  Accompanies with emesis and fatigue since Saturday, almost 1 week now.   Nausea has improved.   Stools have a fowl odor.   Does not think it is food poisoning because friends also ate the same food.   Took charcoal tablets for food poisoning just in case.   Tries to drink cold water.  Unsure if she touched someone who had it.   Used friends toiled seat and now friend has nausea but no diarrhea.   Feels weak, weight has decreased.   Trouble working, post-poned until Tuesday.   Uses box of depends.  Started lisinopril a few days ago and colonoscopy a week ago.   Does not think it has anything to do with these changes.   Nurse does not think she is very contagious now.    Denies blood or mucous in BMs     Hx of DM II  Checks blood sugars at home, 142 today in clinic.   Has trouble using machine.   Occasionally drinks pepsi.         Problem list and histories reviewed & adjusted, as indicated.  Additional history: as documented    Labs reviewed in EPIC    Reviewed and updated as needed this visit by clinical staff  Tobacco  Allergies  Meds  Problems  Med Hx  Surg Hx  Fam Hx  Soc Hx        Reviewed and updated as needed this visit by Provider         ROS:  Constitutional, HEENT, cardiovascular, pulmonary, GI, , musculoskeletal, neuro, skin, endocrine and psych systems are negative, except as otherwise noted.    This document serves as a record of the services and decisions personally performed and made by Blanca Mariee MD. It was created on her behalf by Diane Ortega, a trained medical scribe. The creation of this document is based the provider's statements to the medical scribe.    Diane Ortega May 25, 2018 11:42 AM        OBJECTIVE:     /70  Pulse 56  Temp 97  F (36.1  C) (Oral)  Ht 1.778 m (5' 10\")  Wt 93 kg (205 lb)  LMP  (LMP Unknown)  SpO2 97%  Breastfeeding? No  " BMI 29.41 kg/m2  Body mass index is 29.41 kg/(m^2).  Abdomen soft, nondistended  Bowel sounds positive.       ASSESSMENT/PLAN:     Tonia was seen today for diarrhea, vomiting and fatigue.    Diagnoses and all orders for this visit:    Gastroenteritis  - Recommended Pedialyte and imodium OTC as needed  - Will consider stool studies if symptoms worsen or do not improve.   Most likely it is viral gastroenteritis  She is improving .  Continue symptomatic treatment.    Nausea  -     ondansetron (ZOFRAN ODT) 4 MG ODT tab; Take 1-2 tablets (4-8 mg) by mouth every 8 hours as needed for   -  Prescribed zofran for nausea.       Showed patient how to use glucometer..     Patient instructions:  Take OTC imodium as needed for diarrhea .  Do not take more than 2-3 in 24 hours  Take zofran as needed for nausea    The information in this document, created by the medical scribe for me, accurately reflects the services I personally performed and the decisions made by me. I have reviewed and approved this document for accuracy prior to leaving the patient care area.    Blanca Mariee MD  [facility]

## 2018-05-25 NOTE — TELEPHONE ENCOUNTER
I called patient to follow up on diarrhea symptoms.   Patient is scheduled to see Dr. Mariee at 11:30 as she is still having diarrhea.     Dr. Mariee will see patient this morning.     Prabha Singh RN

## 2018-05-25 NOTE — PATIENT INSTRUCTIONS
Viral Gastroenteritis (Adult)    Gastroenteritis is commonly called the stomach flu. It is most often caused by a virus that affects the stomach and intestinal tract and usually lasts from 2 to 7 days. Common viruses causing gastroenteritis include norovirus, rotavirus, and hepatitis A. Non-viral causes of gastroenteritis include bacteria, parasites, and toxins.  The danger from repeated vomiting or diarrhea is dehydration. This is the loss of too much fluid from the body. When this occurs, body fluids must be replaced. Antibiotics do not help with this illness because it is usually viral.Simple home treatment will be helpful.  Symptoms of viral gastroenteritis may include:    Watery, loose stools    Stomach pain or abdominal cramps    Fever and chills    Nausea and vomiting    Loss of bowel control    Headache  Home care  Gastroenteritis is transmitted by contact with the stool or vomit of an infected person. This can occur from person to person or from contact with a contaminated surface.  Follow these guidelines when caring for yourself at home:    If symptoms are severe, rest at home for the next 24 hours or until you are feeling better.    Wash your hands with soap and water or use alcohol-based  to prevent the spread of infection. Wash your hands after touching anyone who is sick.    Wash your hands or use alcohol-based  after using the toilet and before meals. Clean the toilet after each use.  Remember these tips when preparing food:    People with diarrhea should not prepare or serve food to others. When preparing foods, wash your hands before and after.    Wash your hands after using cutting boards, countertops, knives, or utensils that have been in contact with raw food.    Keep uncooked meats away from cooked and ready-to-eat foods.  Medicine  You may use acetaminophen or NSAID medicines like ibuprofen or naproxen to control fever unless another medicine was given. If you have chronic  liver or kidney disease, talk with your healthcare provider before using these medicines. Also talk with your provider if you've had a stomach ulcer or gastrointestinal bleeding. Don't give aspirin to anyone under 18 years of age who is ill with a fever. It may cause severe liver damage. Don't use NSAIDS is you are already taking one for another condition (like arthritis) or are on aspirin (such as for heart disease or after a stroke).  If medicine for vomiting or diarrhea are prescribed, take these only as directed. Do not take over-the-counter medicines for vomiting or diarrhea unless instructed by your healthcare provider.  Diet  Follow these guidelines for food:    Water and liquids are important so you don't get dehydrated. Drink a small amount at a time or suck on ice chips if you are vomiting.    If you eat, avoid fatty, greasy, spicy, or fried foods.    Don't eat dairy if you have diarrhea. This can make diarrhea worse.    Avoid tobacco, alcohol, and caffeine which may worsen symptoms.  During the first 24 hours (the first full day), follow the diet below:    Beverages. Sports drinks, soft drinks without caffeine, ginger ale, mineral water (plain or flavored), decaffeinated tea and coffee. If you are very dehydrated, sports drinks aren't a good choice. They have too much sugar and not enough electrolytes. In this case, commercially available products called oral rehydration solutions, are best.    Soups. Eat clear broth, consommé, and bouillon.    Desserts. Eat gelatin, popsicles, and fruit juice bars.  During the next 24 hours (the second day), you may add the following to the above:    Hot cereal, plain toast, bread, rolls, and crackers    Plain noodles, rice, mashed potatoes, chicken noodle or rice soup    Unsweetened canned fruit (avoid pineapple), bananas    Limit fat intake to less than 15 grams per day. Do this by avoiding margarine, butter, oils, mayonnaise, sauces, gravies, fried foods, peanut  butter, meat, poultry, and fish.    Limit fiber and avoid raw or cooked vegetables, fresh fruits (except bananas), and bran cereals.    Limit caffeine and chocolate. Don't use spices or seasonings other than salt.    Limit dairy products.    Avoid alcohol.  During the next 24 hours:    Gradually resume a normal diet as you feel better and your symptoms improve.    If at any time it starts getting worse again, go back to clear liquids until you feel better.  Follow-up care  Follow up with your healthcare provider, or as advised. Call your provider if you don't get better within 24 hours or if diarrhea lasts more than a week. Also follow up if you are unable to keep down liquids and get dehydrated. If a stool (diarrhea) sample was taken, call as directed for the results.  Call 911  Call 911 if any of these occur:    Trouble breathing    Chest pain    Confused    Severe drowsiness or trouble awakening    Fainting or loss of consciousness    Rapid heart rate    Seizure    Stiff neck  When to seek medical advice  Call your healthcare provider right away if any of these occur:    Abdominal pain that gets worse    Continued vomiting (unable to keep liquids down)    Frequent diarrhea (more than 5 times a day)    Blood in vomit or stool (black or red color)    Dark urine, reduced urine output, or extreme thirst    Weakness or dizziness    Drowsiness    Fever of 100.4 F (38 C) or higher, or as directed by your healthcare provider    New rash  Date Last Reviewed: 1/3/2016    7541-7243 [employer]. [address] [place] [address], [place] [address]. All rights reserved. This information is not intended as a substitute for professional medical care. Always follow your healthcare professional's instructions.    Take OTC imodium as needed for diarrhea .  Do not take more than 2-3 in 24 hours  Take zofran as needed for nausea

## 2018-05-25 NOTE — MR AVS SNAPSHOT
After Visit Summary   5/25/2018    Tonia Villavicencio    MRN: [id number]           Patient Information     Date Of Birth          1945        Visit Information        Provider Department      5/25/2018 11:30 AM Blanca Mariee MD [facility]        Today's Diagnoses     Gastroenteritis    -  1    Nausea          Care Instructions      Viral Gastroenteritis (Adult)    Gastroenteritis is commonly called the stomach flu. It is most often caused by a virus that affects the stomach and intestinal tract and usually lasts from 2 to 7 days. Common viruses causing gastroenteritis include norovirus, rotavirus, and hepatitis A. Non-viral causes of gastroenteritis include bacteria, parasites, and toxins.  The danger from repeated vomiting or diarrhea is dehydration. This is the loss of too much fluid from the body. When this occurs, body fluids must be replaced. Antibiotics do not help with this illness because it is usually viral.Simple home treatment will be helpful.  Symptoms of viral gastroenteritis may include:    Watery, loose stools    Stomach pain or abdominal cramps    Fever and chills    Nausea and vomiting    Loss of bowel control    Headache  Home care  Gastroenteritis is transmitted by contact with the stool or vomit of an infected person. This can occur from person to person or from contact with a contaminated surface.  Follow these guidelines when caring for yourself at home:    If symptoms are severe, rest at home for the next 24 hours or until you are feeling better.    Wash your hands with soap and water or use alcohol-based  to prevent the spread of infection. Wash your hands after touching anyone who is sick.    Wash your hands or use alcohol-based  after using the toilet and before meals. Clean the toilet after each use.  Remember these tips when preparing food:    People with diarrhea should not prepare or serve food to others. When preparing foods, wash  your hands before and after.    Wash your hands after using cutting boards, countertops, knives, or utensils that have been in contact with raw food.    Keep uncooked meats away from cooked and ready-to-eat foods.  Medicine  You may use acetaminophen or NSAID medicines like ibuprofen or naproxen to control fever unless another medicine was given. If you have chronic liver or kidney disease, talk with your healthcare provider before using these medicines. Also talk with your provider if you've had a stomach ulcer or gastrointestinal bleeding. Don't give aspirin to anyone under 18 years of age who is ill with a fever. It may cause severe liver damage. Don't use NSAIDS is you are already taking one for another condition (like arthritis) or are on aspirin (such as for heart disease or after a stroke).  If medicine for vomiting or diarrhea are prescribed, take these only as directed. Do not take over-the-counter medicines for vomiting or diarrhea unless instructed by your healthcare provider.  Diet  Follow these guidelines for food:    Water and liquids are important so you don't get dehydrated. Drink a small amount at a time or suck on ice chips if you are vomiting.    If you eat, avoid fatty, greasy, spicy, or fried foods.    Don't eat dairy if you have diarrhea. This can make diarrhea worse.    Avoid tobacco, alcohol, and caffeine which may worsen symptoms.  During the first 24 hours (the first full day), follow the diet below:    Beverages. Sports drinks, soft drinks without caffeine, ginger ale, mineral water (plain or flavored), decaffeinated tea and coffee. If you are very dehydrated, sports drinks aren't a good choice. They have too much sugar and not enough electrolytes. In this case, commercially available products called oral rehydration solutions, are best.    Soups. Eat clear broth, consommé, and bouillon.    Desserts. Eat gelatin, popsicles, and fruit juice bars.  During the next 24 hours (the second day),  you may add the following to the above:    Hot cereal, plain toast, bread, rolls, and crackers    Plain noodles, rice, mashed potatoes, chicken noodle or rice soup    Unsweetened canned fruit (avoid pineapple), bananas    Limit fat intake to less than 15 grams per day. Do this by avoiding margarine, butter, oils, mayonnaise, sauces, gravies, fried foods, peanut butter, meat, poultry, and fish.    Limit fiber and avoid raw or cooked vegetables, fresh fruits (except bananas), and bran cereals.    Limit caffeine and chocolate. Don't use spices or seasonings other than salt.    Limit dairy products.    Avoid alcohol.  During the next 24 hours:    Gradually resume a normal diet as you feel better and your symptoms improve.    If at any time it starts getting worse again, go back to clear liquids until you feel better.  Follow-up care  Follow up with your healthcare provider, or as advised. Call your provider if you don't get better within 24 hours or if diarrhea lasts more than a week. Also follow up if you are unable to keep down liquids and get dehydrated. If a stool (diarrhea) sample was taken, call as directed for the results.  Call 911  Call 911 if any of these occur:    Trouble breathing    Chest pain    Confused    Severe drowsiness or trouble awakening    Fainting or loss of consciousness    Rapid heart rate    Seizure    Stiff neck  When to seek medical advice  Call your healthcare provider right away if any of these occur:    Abdominal pain that gets worse    Continued vomiting (unable to keep liquids down)    Frequent diarrhea (more than 5 times a day)    Blood in vomit or stool (black or red color)    Dark urine, reduced urine output, or extreme thirst    Weakness or dizziness    Drowsiness    Fever of 100.4 F (38 C) or higher, or as directed by your healthcare provider    New rash  Date Last Reviewed: 1/3/2016    0345-0586 [employer]. [address] [place] [address], [place] [address]. All rights  "reserved. This information is not intended as a substitute for professional medical care. Always follow your healthcare professional's instructions.    Take OTC imodium as needed for diarrhea .  Do not take more than 2-3 in 24 hours  Take zofran as needed for nausea                Follow-ups after your visit        Who to contact     If you have questions or need follow up information about today's clinic visit or your schedule please contact [facility] directly at [phone].  Normal or non-critical lab and imaging results will be communicated to you by MyChart, letter or phone within 4 business days after the clinic has received the results. If you do not hear from us within 7 days, please contact the clinic through [employer] or phone. If you have a critical or abnormal lab result, we will notify you by phone as soon as possible.  Submit refill requests through [employer] or call your pharmacy and they will forward the refill request to us. Please allow 3 business days for your refill to be completed.          Additional Information About Your Visit        Care EveryWhere ID     This is your Care EveryWhere ID. This could be used by other organizations to access your [facility] medical records  [id number]        Your Vitals Were     Pulse Temperature Height Last Period Pulse Oximetry Breastfeeding?    56 97  F (36.1  C) (Oral) 5' 10\" (1.778 m) (LMP Unknown) 97% No    BMI (Body Mass Index)                   29.41 kg/m2            Blood Pressure from Last 3 Encounters:   05/25/18 112/70   05/11/18 128/86   05/09/18 (!) 130/104    Weight from Last 3 Encounters:   05/25/18 205 lb (93 kg)   05/11/18 210 lb (95.3 kg)   05/03/18 215 lb (97.5 kg)              Today, you had the following     No orders found for display         Today's Medication Changes          These changes are accurate as of 5/25/18 11:44 AM.  If you have any questions, ask your nurse or doctor.               Start taking these medicines.     "    Dose/Directions    ondansetron 4 MG ODT tab   Commonly known as:  ZOFRAN ODT   Used for:  Nausea   Started by:  Blanca Mariee MD        Dose:  4-8 mg   Take 1-2 tablets (4-8 mg) by mouth every 8 hours as needed for nausea   Quantity:  20 tablet   Refills:  1            Where to get your medicines      These medications were sent to [facility]/pharmacy #[id number] - [place], [place] - [address] [place] [address] AT CORNER OF [address] [place] [id number]     Phone:  [phone]     ondansetron 4 MG ODT tab                Primary Care Provider Office Phone # Fax #    Blanca Mariee [phone] [address] [address]        Equal Access to Services     SOL GU AH: Hadii deya ku hadasho Somelvin, waaxda luqadaha, qaybta kaalmada adeegyada, merry serrano . So [facility] [phone].    ATENCIÓN: Si habla español, tiene a pradhan disposición servicios gratuitos de asistencia lingüística. Llame al [phone].    We comply with applicable federal civil rights laws and [place] laws. We do not discriminate on the basis of race, color, national origin, age, disability, sex, sexual orientation, or gender identity.            Thank you!     Thank you for choosing [facility]  for your care. Our goal is always to provide you with excellent care. Hearing back from our patients is one way we can continue to improve our services. Please take a few minutes to complete the written survey that you may receive in the mail after your visit with us. Thank you!             Your Updated Medication List - Protect others around you: Learn how to safely use, store and throw away your medicines at www.disposemymeds.org.          This list is accurate as of 5/25/18 11:44 AM.  Always use your most recent med list.                   Brand Name Dispense Instructions for use Diagnosis    aspirin 81 MG EC tablet     90 tablet    Take 1 tablet (81  mg) by mouth daily    Type 2 diabetes mellitus without complication, without long-term current use of insulin (H)       blood glucose lancets standard    no brand specified    100 each    Use to test blood sugar 1 times daily or as directed.    Type 2 diabetes mellitus without complication, without long-term current use of insulin (H)       blood glucose monitoring meter device kit    no brand specified    1 kit    Use to test blood sugar one times daily or as directed.    Type 2 diabetes mellitus without complication, without long-term current use of insulin (H)       blood glucose monitoring test strip    no brand specified    100 strip    Use to test blood sugars one times daily or as directed    Type 2 diabetes mellitus without complication, without long-term current use of insulin (H)       lisinopril 10 MG tablet    PRINIVIL/ZESTRIL    90 tablet    Take 1 tablet (10 mg) by mouth daily    Benign essential hypertension, Type 2 diabetes mellitus without complication, without long-term current use of insulin (H)       metoprolol tartrate 50 MG tablet    LOPRESSOR    180 tablet    TAKE 1 TABLET (50 MG) BY MOUTH 2 TIMES DAILY    Benign essential hypertension       ondansetron 4 MG ODT tab    ZOFRAN ODT    20 tablet    Take 1-2 tablets (4-8 mg) by mouth every 8 hours as needed for nausea    Nausea       STATIN NOT PRESCRIBED (INTENTIONAL)      Please choose reason not prescribed, below    Type 2 diabetes mellitus without complication, without long-term current use of insulin (H)       triamterene-hydrochlorothiazide 75-50 MG per tablet    MAXZIDE    90 tablet    Take 1 tablet by mouth daily    Benign essential hypertension

## 2018-09-18 PROBLEM — E66.9 OBESITY, UNSPECIFIED CLASSIFICATION, UNSPECIFIED OBESITY TYPE, UNSPECIFIED WHETHER SERIOUS COMORBIDITY PRESENT: Status: ACTIVE | Noted: 2018-09-18

## 2018-11-16 DIAGNOSIS — I10 BENIGN ESSENTIAL HYPERTENSION: ICD-10-CM

## 2018-11-16 DIAGNOSIS — E11.9 TYPE 2 DIABETES MELLITUS WITHOUT COMPLICATION, WITHOUT LONG-TERM CURRENT USE OF INSULIN (H): ICD-10-CM

## 2018-11-16 RX ORDER — LISINOPRIL 10 MG/1
TABLET ORAL
Qty: 90 TABLET | Refills: 2 | Status: SHIPPED | OUTPATIENT
Start: 2018-11-16 | End: 2019-06-27 | Stop reason: SINTOL

## 2018-11-16 NOTE — TELEPHONE ENCOUNTER
"Requested Prescriptions   Pending Prescriptions Disp Refills     lisinopril (PRINIVIL/ZESTRIL) 10 MG tablet [Pharmacy Med Name: LISINOPRIL 10 MG TABLET] 90 tablet 1     Sig: TAKE 1 TABLET BY MOUTH EVERY DAY (STOP TAKING AMLODIPINE)    ACE Inhibitors (Including Combos) Protocol Passed    11/16/2018 10:57 AM       Passed - Blood pressure under 140/90 in past 12 months    BP Readings from Last 3 Encounters:   05/25/18 112/70   05/11/18 128/86   05/09/18 (!) 130/104                Passed - Recent (12 mo) or future (30 days) visit within the authorizing provider's specialty    Patient had office visit in the last 12 months or has a visit in the next 30 days with authorizing provider or within the authorizing provider's specialty.  See \"Patient Info\" tab in inbasket, or \"Choose Columns\" in Meds & Orders section of the refill encounter.             Passed - Patient is age 18 or older       Passed - No active pregnancy on record       Passed - Normal serum creatinine on file in past 12 months    Recent Labs   Lab Test  04/30/18   0920   CR  1.00            Passed - Normal serum potassium on file in past 12 months    Recent Labs   Lab Test  04/30/18   0920   POTASSIUM  3.9            Passed - No positive pregnancy test in past 12 months        Last Written Prescription Date:  05/11/18  Last Fill Quantity: 90,  # refills: 1   Last office visit: 5/25/2018 with prescribing provider:     Future Office Visit:      Vi Betts MA    "

## 2019-03-16 DIAGNOSIS — I10 BENIGN ESSENTIAL HYPERTENSION: ICD-10-CM

## 2019-03-16 NOTE — TELEPHONE ENCOUNTER
"Last Written Prescription Date:  3/05/18  Last Fill Quantity: 90 tablet,  # refills: 3   Last office visit: 5/25/2018 with prescribing provider:  Jaylene   Future Office Visit:      Requested Prescriptions   Pending Prescriptions Disp Refills     triamterene-HCTZ (MAXZIDE) 75-50 MG tablet [Pharmacy Med Name: TRIAMTERENE-HCTZ 75-50 MG TAB] 90 tablet 3     Sig: TAKE 1 TABLET BY MOUTH DAILY    Diuretics (Including Combos) Protocol Passed - 3/16/2019  8:46 AM       Passed - Blood pressure under 140/90 in past 12 months    BP Readings from Last 3 Encounters:   05/25/18 112/70   05/11/18 128/86   05/09/18 (!) 130/104                Passed - Recent (12 mo) or future (30 days) visit within the authorizing provider's specialty    Patient had office visit in the last 12 months or has a visit in the next 30 days with authorizing provider or within the authorizing provider's specialty.  See \"Patient Info\" tab in inbasket, or \"Choose Columns\" in Meds & Orders section of the refill encounter.             Passed - Medication is active on med list       Passed - Patient is age 18 or older       Passed - No active pregancy on record       Passed - Normal serum creatinine on file in past 12 months    Recent Labs   Lab Test 04/30/18  0920   CR 1.00             Passed - Normal serum potassium on file in past 12 months    Recent Labs   Lab Test 04/30/18  0920   POTASSIUM 3.9                   Passed - Normal serum sodium on file in past 12 months    Recent Labs   Lab Test 04/30/18  0920                Passed - No positive pregnancy test in past 12 months          "

## 2019-03-18 RX ORDER — TRIAMTERENE AND HYDROCHLOROTHIAZIDE 75; 50 MG/1; MG/1
1 TABLET ORAL DAILY
Qty: 64 TABLET | Refills: 0 | Status: SHIPPED | OUTPATIENT
Start: 2019-03-18 | End: 2019-05-20

## 2019-03-26 DIAGNOSIS — I10 BENIGN ESSENTIAL HYPERTENSION: ICD-10-CM

## 2019-03-26 NOTE — TELEPHONE ENCOUNTER
"Pending Prescriptions:                       Disp   Refills    metoprolol tartrate (LOPRESSOR) 50 MG tab*180 ta*3            Sig: TAKE 1 TABLET (50 MG) BY MOUTH 2 TIMES DAILY    Last Written Prescription Date:  3/5/18  Last Fill Quantity: 180,  # refills: 3   Last office visit: 5/25/2018 with prescribing provider:     Future Office Visit:    Requested Prescriptions   Pending Prescriptions Disp Refills     metoprolol tartrate (LOPRESSOR) 50 MG tablet [Pharmacy Med Name: METOPROLOL TARTRATE 50 MG TAB] 180 tablet 3     Sig: TAKE 1 TABLET (50 MG) BY MOUTH 2 TIMES DAILY    Beta-Blockers Protocol Passed - 3/26/2019  1:43 AM       Passed - Blood pressure under 140/90 in past 12 months    BP Readings from Last 3 Encounters:   05/25/18 112/70   05/11/18 128/86   05/09/18 (!) 130/104                Passed - Patient is age 6 or older       Passed - Recent (12 mo) or future (30 days) visit within the authorizing provider's specialty    Patient had office visit in the last 12 months or has a visit in the next 30 days with authorizing provider or within the authorizing provider's specialty.  See \"Patient Info\" tab in inbasket, or \"Choose Columns\" in Meds & Orders section of the refill encounter.             Passed - Medication is active on med list          "

## 2019-03-27 RX ORDER — METOPROLOL TARTRATE 50 MG
TABLET ORAL
Qty: 180 TABLET | Refills: 0 | Status: SHIPPED | OUTPATIENT
Start: 2019-03-27 | End: 2019-06-27

## 2019-03-27 NOTE — TELEPHONE ENCOUNTER
Prescription approved per [facility] [place].  Jackie SAEZ RN

## 2019-05-20 DIAGNOSIS — I10 BENIGN ESSENTIAL HYPERTENSION: ICD-10-CM

## 2019-05-20 NOTE — TELEPHONE ENCOUNTER
"triamterene-HCTZ (MAXZIDE) 75-50 MG tablet    Last Written Prescription Date:  03/18/2019  Last Fill Quantity: 64,  # refills: 0   Last office visit: 5/25/2018 with prescribing provider:  Jaylene   Future Office Visit:  Unknown     Requested Prescriptions   Pending Prescriptions Disp Refills     triamterene-HCTZ (MAXZIDE) 75-50 MG tablet 30 tablet 0     Sig: Take 1 tablet by mouth daily - Due NOW for office visit.       Diuretics (Including Combos) Protocol Failed - 5/20/2019  3:42 PM        Failed - Normal serum creatinine on file in past 12 months     Recent Labs   Lab Test 04/30/18  0920   CR 1.00              Failed - Normal serum potassium on file in past 12 months     Recent Labs   Lab Test 04/30/18  0920   POTASSIUM 3.9                    Failed - Normal serum sodium on file in past 12 months     Recent Labs   Lab Test 04/30/18  0920                 Passed - Blood pressure under 140/90 in past 12 months     BP Readings from Last 3 Encounters:   05/25/18 112/70   05/11/18 128/86   05/09/18 (!) 130/104                 Passed - Recent (12 mo) or future (30 days) visit within the authorizing provider's specialty     Patient had office visit in the last 12 months or has a visit in the next 30 days with authorizing provider or within the authorizing provider's specialty.  See \"Patient Info\" tab in inbasket, or \"Choose Columns\" in Meds & Orders section of the refill encounter.              Passed - Medication is active on med list        Passed - Patient is age 18 or older        Passed - No active pregancy on record        Passed - No positive pregnancy test in past 12 months          "

## 2019-05-22 RX ORDER — TRIAMTERENE AND HYDROCHLOROTHIAZIDE 75; 50 MG/1; MG/1
1 TABLET ORAL DAILY
Qty: 30 TABLET | Refills: 0 | Status: SHIPPED | OUTPATIENT
Start: 2019-05-22 | End: 2019-06-17

## 2019-05-22 NOTE — TELEPHONE ENCOUNTER
Routing to [facility] to contact patient to inform due for appointment. Thank you.       Routing refill request to provider for review/approval because:  Jennifer given x1 and patient did not follow up, please advise    Jackie SAEZ RN

## 2019-05-23 NOTE — TELEPHONE ENCOUNTER
Pt returned call. Pt stated she is very busy and doesn't know when she will  Be able to come in. Pt requesting one month refill and will call back to schedule

## 2019-05-23 NOTE — TELEPHONE ENCOUNTER
Which medication she needs  We did renew her triamterene/HCTZ for one month  Dr.Nasima Jaylene MD

## 2019-06-27 ENCOUNTER — OFFICE VISIT (OUTPATIENT)
Dept: FAMILY MEDICINE | Facility: CLINIC | Age: 74
End: 2019-06-27
Payer: COMMERCIAL

## 2019-06-27 ENCOUNTER — HOSPITAL ENCOUNTER (OUTPATIENT)
Dept: MAMMOGRAPHY | Facility: CLINIC | Age: 74
Discharge: HOME | End: 2019-06-27
Attending: INTERNAL MEDICINE | Admitting: INTERNAL MEDICINE
Payer: COMMERCIAL

## 2019-06-27 VITALS
WEIGHT: 206.2 LBS | OXYGEN SATURATION: 98 % | BODY MASS INDEX: 29.52 KG/M2 | DIASTOLIC BLOOD PRESSURE: 79 MMHG | HEIGHT: 70 IN | HEART RATE: 64 BPM | TEMPERATURE: 96.8 F | SYSTOLIC BLOOD PRESSURE: 132 MMHG

## 2019-06-27 DIAGNOSIS — M85.859 OSTEOPENIA OF HIP, UNSPECIFIED LATERALITY: ICD-10-CM

## 2019-06-27 DIAGNOSIS — Z12.31 VISIT FOR SCREENING MAMMOGRAM: ICD-10-CM

## 2019-06-27 DIAGNOSIS — N18.30 CKD (CHRONIC KIDNEY DISEASE) STAGE 3, GFR 30-59 ML/MIN (H): ICD-10-CM

## 2019-06-27 DIAGNOSIS — I10 ESSENTIAL HYPERTENSION: ICD-10-CM

## 2019-06-27 DIAGNOSIS — E11.9 TYPE 2 DIABETES MELLITUS WITHOUT COMPLICATION, WITHOUT LONG-TERM CURRENT USE OF INSULIN (H): Primary | ICD-10-CM

## 2019-06-27 LAB
ALBUMIN SERPL-MCNC: 3.9 G/DL (ref 3.4–5)
ALP SERPL-CCNC: 72 U/L (ref 40–150)
ALT SERPL W P-5'-P-CCNC: 39 U/L (ref 0–50)
ANION GAP SERPL CALCULATED.3IONS-SCNC: 12 MMOL/L (ref 3–14)
AST SERPL W P-5'-P-CCNC: 23 U/L (ref 0–45)
BILIRUB SERPL-MCNC: 0.7 MG/DL (ref 0.2–1.3)
BUN SERPL-MCNC: 27 MG/DL (ref 7–30)
CALCIUM SERPL-MCNC: 9.8 MG/DL (ref 8.5–10.1)
CHLORIDE SERPL-SCNC: 106 MMOL/L (ref 94–109)
CHOLEST SERPL-MCNC: 261 MG/DL
CO2 SERPL-SCNC: 23 MMOL/L (ref 20–32)
CREAT SERPL-MCNC: 1.02 MG/DL (ref 0.52–1.04)
CREAT UR-MCNC: 77 MG/DL
GFR SERPL CREATININE-BSD FRML MDRD: 54 ML/MIN/{1.73_M2}
GLUCOSE SERPL-MCNC: 156 MG/DL (ref 70–99)
HBA1C MFR BLD: 7.9 % (ref 0–5.6)
HDLC SERPL-MCNC: 48 MG/DL
LDLC SERPL CALC-MCNC: 173 MG/DL
MICROALBUMIN UR-MCNC: 9 MG/L
MICROALBUMIN/CREAT UR: 11.79 MG/G CR (ref 0–25)
NONHDLC SERPL-MCNC: 213 MG/DL
POTASSIUM SERPL-SCNC: 3.9 MMOL/L (ref 3.4–5.3)
PROT SERPL-MCNC: 7.6 G/DL (ref 6.8–8.8)
SODIUM SERPL-SCNC: 141 MMOL/L (ref 133–144)
TRIGL SERPL-MCNC: 198 MG/DL
TSH SERPL DL<=0.005 MIU/L-ACNC: 2 MU/L (ref 0.4–4)

## 2019-06-27 PROCEDURE — 84443 ASSAY THYROID STIM HORMONE: CPT | Performed by: INTERNAL MEDICINE

## 2019-06-27 PROCEDURE — 99214 OFFICE O/P EST MOD 30 MIN: CPT | Performed by: INTERNAL MEDICINE

## 2019-06-27 PROCEDURE — 83036 HEMOGLOBIN GLYCOSYLATED A1C: CPT | Performed by: INTERNAL MEDICINE

## 2019-06-27 PROCEDURE — 99207 C FOOT EXAM  NO CHARGE: CPT | Performed by: INTERNAL MEDICINE

## 2019-06-27 PROCEDURE — 80053 COMPREHEN METABOLIC PANEL: CPT | Performed by: INTERNAL MEDICINE

## 2019-06-27 PROCEDURE — 36415 COLL VENOUS BLD VENIPUNCTURE: CPT | Performed by: INTERNAL MEDICINE

## 2019-06-27 PROCEDURE — 80061 LIPID PANEL: CPT | Performed by: INTERNAL MEDICINE

## 2019-06-27 PROCEDURE — 82043 UR ALBUMIN QUANTITATIVE: CPT | Performed by: INTERNAL MEDICINE

## 2019-06-27 PROCEDURE — 77067 SCR MAMMO BI INCL CAD: CPT

## 2019-06-27 RX ORDER — LISINOPRIL 10 MG/1
10 TABLET ORAL DAILY
Qty: 90 TABLET | Refills: 3 | Status: CANCELLED | OUTPATIENT
Start: 2019-06-27

## 2019-06-27 RX ORDER — METOPROLOL TARTRATE 50 MG
50 TABLET ORAL 2 TIMES DAILY
Qty: 180 TABLET | Refills: 3 | Status: SHIPPED | OUTPATIENT
Start: 2019-06-27 | End: 2020-07-03

## 2019-06-27 RX ORDER — TRIAMTERENE AND HYDROCHLOROTHIAZIDE 75; 50 MG/1; MG/1
1 TABLET ORAL DAILY
Qty: 90 TABLET | Refills: 3 | Status: SHIPPED | OUTPATIENT
Start: 2019-06-27 | End: 2020-09-14

## 2019-06-27 RX ORDER — LOSARTAN POTASSIUM 25 MG/1
25 TABLET ORAL DAILY
Qty: 90 TABLET | Refills: 1 | Status: SHIPPED | OUTPATIENT
Start: 2019-06-27 | End: 2019-12-11

## 2019-06-27 ASSESSMENT — MIFFLIN-ST. JEOR: SCORE: 1507.63

## 2019-06-27 NOTE — PATIENT INSTRUCTIONS
Discontinue lisinopril due to cough  Take losartan 25 mg daily instead  Continue your other blood pressure medications  Mammogram is done in [address]  Please call the following number to make appointment at your earliest convenience:  [phone]  There is a new Shingles vaccine called SHINGRIX  It's is a series of two shots, 2-6 months apart  It is considered more than 90% effective  Please go to any pharmacy to get the vaccine  Follow up in 4 months  Seek sooner medical attention if there is any worsening of symptoms or problems

## 2019-06-27 NOTE — PROGRESS NOTES
Subjective     Tonia Villavicencio is a 74 year old female who presents to clinic today for the following health issues:    HPI   Medication Followup of  Maxide    Taking Medication as prescribed: yes    Side Effects:  None    Medication Helping Symptoms:  Yes    Coughing from Lisinopril   She's been having dry cough     Patient has run a mile  She got a shot in her knee  It helps her ability to walk a little bit  However, she's unable to walk for long distances  She's against surgery at this point    Patient Active Problem List   Diagnosis     Essential hypertension     Hyperlipidemia     Type 2 diabetes mellitus without complication, without long-term current use of insulin (H)     Obesity, unspecified classification, unspecified obesity type, unspecified whether serious comorbidity present     CKD (chronic kidney disease) stage 3, GFR 30-59 ml/min (H)     Past Surgical History:   Procedure Laterality Date     ARTHROSCOPY KNEE       GYN SURGERY      ovarian cyst removal and ovary       Social History     Tobacco Use     Smoking status: Never Smoker     Smokeless tobacco: Never Used   Substance Use Topics     Alcohol use: No     Alcohol/week: 0.0 oz     Family History   Problem Relation Age of Onset     Heart Disease Mother      Prostate Cancer Father      Diabetes Brother      Breast Cancer No family hx of      Colon Cancer No family hx of          Current Outpatient Medications   Medication Sig Dispense Refill     aspirin 81 MG EC tablet Take 1 tablet (81 mg) by mouth daily 90 tablet 3     blood glucose (NO BRAND SPECIFIED) lancets standard Use to test blood sugar 1 times daily or as directed. 100 each 1     blood glucose monitoring (NO BRAND SPECIFIED) meter device kit Use to test blood sugar one times daily or as directed. 1 kit 0     blood glucose monitoring (NO BRAND SPECIFIED) test strip Use to test blood sugars one times daily or as directed 100 strip 3     losartan (COZAAR) 25 MG tablet Take 1 tablet (25 mg)  "by mouth daily 90 tablet 1     metoprolol tartrate (LOPRESSOR) 50 MG tablet Take 1 tablet (50 mg) by mouth 2 times daily 180 tablet 3     ondansetron (ZOFRAN ODT) 4 MG ODT tab Take 1-2 tablets (4-8 mg) by mouth every 8 hours as needed for nausea 20 tablet 1     triamterene-HCTZ (MAXZIDE) 75-50 MG tablet Take 1 tablet by mouth daily 90 tablet 3     STATIN NOT PRESCRIBED, INTENTIONAL, Please choose reason not prescribed, below (Patient not taking: Reported on 5/25/2018)       Recent Labs   Lab Test 06/27/19  1008 04/30/18  0920  05/19/16  1010 11/04/14  1400   A1C 7.9* 7.9*  --   --   --    * 187*  --   --  162*   HDL 48* 47*  --   --  62   TRIG 198* 278*  --   --  172*   ALT 39 36  --  41 39   CR 1.02 1.00   < > 0.86 1.00   GFRESTIMATED 54* 55*   < > 65 55*   GFRESTBLACK 63 66   < > 79 67   POTASSIUM 3.9 3.9   < > 3.7 3.9   TSH 2.00 2.54   < >  --   --     < > = values in this interval not displayed.      Reviewed and updated as needed this visit by Provider         Review of Systems   ROS COMP: Constitutional, HEENT, cardiovascular, pulmonary, GI, , musculoskeletal, neuro, skin, endocrine and psych systems are negative, except as otherwise noted.    This document serves as a record of the services and decisions personally performed and made by Blanca Mariee MD. It was created on her behalf by Kandy Peters, a trained medical scribe. The creation of this document is based on the provider's statements to the medical scribe.  Kandy Peters 9:46 AM June 27, 2019        Objective    /79   Pulse 64   Temp 96.8  F (36  C) (Oral)   Ht 1.765 m (5' 9.5\")   Wt 93.5 kg (206 lb 3.2 oz)   LMP  (LMP Unknown)   SpO2 98%   BMI 30.01 kg/m    Body mass index is 30.01 kg/m .  Physical Exam   GENERAL APPEARANCE: healthy, alert and no distress  EYES: Eyes grossly normal to inspection, PERRL and conjunctivae and sclerae normal  HENT: ear canals and TM's normal and nose and mouth without ulcers or " lesions  NECK: no adenopathy  RESP: lungs clear to auscultation - no rales, rhonchi or wheezes  CV: regular rates and rhythm, normal S1 S2, no S3  PSYCH: mentation appears normal, affect normal/bright  FOOT: sensation intact, DP and PT felt, no skin changes    Diagnostic Test Results:  Labs reviewed in Epic  Results for orders placed or performed in visit on 06/27/19 (from the past 24 hour(s))   TSH with free T4 reflex   Result Value Ref Range    TSH 2.00 0.40 - 4.00 mU/L   Lipid panel reflex to direct LDL Fasting   Result Value Ref Range    Cholesterol 261 (H) <200 mg/dL    Triglycerides 198 (H) <150 mg/dL    HDL Cholesterol 48 (L) >49 mg/dL    LDL Cholesterol Calculated 173 (H) <100 mg/dL    Non HDL Cholesterol 213 (H) <130 mg/dL   Hemoglobin A1c   Result Value Ref Range    Hemoglobin A1C 7.9 (H) 0 - 5.6 %   Comprehensive metabolic panel   Result Value Ref Range    Sodium 141 133 - 144 mmol/L    Potassium 3.9 3.4 - 5.3 mmol/L    Chloride 106 94 - 109 mmol/L    Carbon Dioxide 23 20 - 32 mmol/L    Anion Gap 12 3 - 14 mmol/L    Glucose 156 (H) 70 - 99 mg/dL    Urea Nitrogen 27 7 - 30 mg/dL    Creatinine 1.02 0.52 - 1.04 mg/dL    GFR Estimate 54 (L) >60 mL/min/[1.73_m2]    GFR Estimate If Black 63 >60 mL/min/[1.73_m2]    Calcium 9.8 8.5 - 10.1 mg/dL    Bilirubin Total 0.7 0.2 - 1.3 mg/dL    Albumin 3.9 3.4 - 5.0 g/dL    Protein Total 7.6 6.8 - 8.8 g/dL    Alkaline Phosphatase 72 40 - 150 U/L    ALT 39 0 - 50 U/L    AST 23 0 - 45 U/L   Albumin Random Urine Quantitative with Creat Ratio   Result Value Ref Range    Creatinine Urine 77 mg/dL    Albumin Urine mg/L 9 mg/L    Albumin Urine mg/g Cr 11.79 0 - 25 mg/g Cr           Assessment & Plan     Tonia was seen today for recheck medication.    Diagnoses and all orders for this visit:    Type 2 diabetes mellitus without complication, without long-term current use of insulin (H)  -     TSH with free T4 reflex  -     Lipid panel reflex to direct LDL Fasting  -      "Hemoglobin A1c  -     Comprehensive metabolic panel  -     Albumin Random Urine Quantitative with Creat Ratio  -     C FOOT EXAM  NO CHARGE  Doing well  Declined medication for diabetes and cholesterol  She is implementing dietary changes  Explained to her that if her diabetes continue to be uncontrolled, she should be on medications   But she declined for medication for diabetes and cholesterol.  Patient has an active lifestyle and eats well  Diabetic foot exam was normal  Lab Results   Component Value Date    A1C 7.9 04/30/2018     CKD (chronic kidney disease) stage 3, GFR 30-59 ml/min (H)  Stable  Continued monitoring    Essential hypertension  -     losartan (COZAAR) 25 MG tablet; Take 1 tablet (25 mg) by mouth daily  Patient developed a cough with lisinopril  Discontinued it  Prescribed losartan instead  Advised compliance with medication  Advised monitoring BP at home and reporting readings above 140/90    Osteopenia of hip, unspecified laterality  Advised healthy eating and exercise habits  Advised Vitamin D supplements daily    Other orders  -     metoprolol tartrate (LOPRESSOR) 50 MG tablet; Take 1 tablet (50 mg) by mouth 2 times daily  -     triamterene-HCTZ (MAXZIDE) 75-50 MG tablet; Take 1 tablet by mouth daily     BMI:   Estimated body mass index is 30.01 kg/m  as calculated from the following:    Height as of this encounter: 1.765 m (5' 9.5\").    Weight as of this encounter: 93.5 kg (206 lb 3.2 oz).      Patient believes in natural medicine .  Patient Instructions   Discontinue lisinopril due to cough  Take losartan 25 mg daily instead  Continue your other blood pressure medications  Mammogram is done in [address]  Please call the following number to make appointment at your earliest convenience:  [phone]  There is a new Shingles vaccine called SHINGRIX  It's is a series of two shots, 2-6 months apart  It is considered more than 90% effective  Please go to any pharmacy to get the vaccine  Follow " up in 4 months  Seek sooner medical attention if there is any worsening of symptoms or problems    Return in about 3 months (around 9/27/2019) for Hypertension, Diabetes.    The information in this document, created by the medical scribe for me, accurately reflects the services I personally performed and the decisions made by me. I have reviewed and approved this document for accuracy prior to leaving the patient care area.  June 27, 2019 10:05 AM    Blanca Mariee MD  [facility]

## 2019-06-27 NOTE — LETTER
[facility]  [address] [facility]  [address]  [place]  [address]  Tel: [phone]    June 28, 2019    Tonia J Maye  [address] [place] [address]        Dear Ms. Villavicencio,    This is to inform you regarding your test result.    HbA1c which is average glucose during last 3 months is 7.9%.  Diabetes is not under control  The numbers remains the same .  you should be on metformin to lower your A1c  You have declined to go on metformin during the office visit  If you change your mind then please let me know.  this will be beneficial for your diabetes  Urine for Microalbumin is normal.  TSH which is thyroid hormone is normal.  Your total cholesterol is elevated.  The triglycerides are high. Lowering  the amount of sugar ,alcohol and sweets in the diet helps to control this.Exercise and weight loss helps.  HDL which is called good cholesterol is low.  Your LDL which is called bad cholesterol is elevated.  Eat low cholesterol low fat  diet and do regular physical activity. Avoid high sugar containing food.  You should be on cholesterol lowering medication   Your numbers are very high  I recommend a cholesterol-lowering medication but you have declined during the office visit  If you change your mind please let me know  There is a benefit of taking cholesterol-lowering medication.     Lab Results       Component                Value               Date                       A1C                      7.9                 06/27/2019                 A1C                      7.9                 04/30/2018              Sincerely,    Dr.Nasima Jaylene MD,FACP/SML      Enclosure: Lab Results  Results for orders placed or performed in visit on 06/27/19   TSH with free T4 reflex   Result Value Ref Range    TSH 2.00 0.40 - 4.00 mU/L   Lipid panel reflex to direct LDL Fasting   Result Value Ref Range    Cholesterol 261 (H) <200 mg/dL    Triglycerides 198 (H) <150 mg/dL    HDL Cholesterol 48 (L) >49 mg/dL     LDL Cholesterol Calculated 173 (H) <100 mg/dL    Non HDL Cholesterol 213 (H) <130 mg/dL   Hemoglobin A1c   Result Value Ref Range    Hemoglobin A1C 7.9 (H) 0 - 5.6 %   Comprehensive metabolic panel   Result Value Ref Range    Sodium 141 133 - 144 mmol/L    Potassium 3.9 3.4 - 5.3 mmol/L    Chloride 106 94 - 109 mmol/L    Carbon Dioxide 23 20 - 32 mmol/L    Anion Gap 12 3 - 14 mmol/L    Glucose 156 (H) 70 - 99 mg/dL    Urea Nitrogen 27 7 - 30 mg/dL    Creatinine 1.02 0.52 - 1.04 mg/dL    GFR Estimate 54 (L) >60 mL/min/[1.73_m2]    GFR Estimate If Black 63 >60 mL/min/[1.73_m2]    Calcium 9.8 8.5 - 10.1 mg/dL    Bilirubin Total 0.7 0.2 - 1.3 mg/dL    Albumin 3.9 3.4 - 5.0 g/dL    Protein Total 7.6 6.8 - 8.8 g/dL    Alkaline Phosphatase 72 40 - 150 U/L    ALT 39 0 - 50 U/L    AST 23 0 - 45 U/L   Albumin Random Urine Quantitative with Creat Ratio   Result Value Ref Range    Creatinine Urine 77 mg/dL    Albumin Urine mg/L 9 mg/L    Albumin Urine mg/g Cr 11.79 0 - 25 mg/g Cr

## 2019-06-28 NOTE — RESULT ENCOUNTER NOTE
Please notify patient by sending following letter with copy of test results      Brisa Randall,    This is to inform you regarding your test result.    HbA1c which is average glucose during last 3 months is 7.9%.  Diabetes is not under control  The numbers remains the same .  you should be on metformin to lower your A1c  You have declined to go on metformin during the office visit  If you change your mind then please let me know.  this will be beneficial for your diabetes  Urine for Microalbumin is normal.  TSH which is thyroid hormone is normal.  Your total cholesterol is elevated.  The triglycerides are high. Lowering  the amount of sugar ,alcohol and sweets in the diet helps to control this.Exercise and weight loss helps.  HDL which is called good cholesterol is low.  Your LDL which is called bad cholesterol is elevated.  Eat low cholesterol low fat  diet and do regular physical activity. Avoid high sugar containing food.  You should be on cholesterol lowering medication   Your numbers are very high  I recommend a cholesterol-lowering medication but you have declined during the office visit  If you change your mind please let me know  There is a benefit of taking cholesterol-lowering medication.     Lab Results       Component                Value               Date                       A1C                      7.9                 06/27/2019                 A1C                      7.9                 04/30/2018              Sincerely,    Dr.Nasima Jaylene MD,FACP

## 2019-08-14 NOTE — TELEPHONE ENCOUNTER
"Requested Prescriptions   Pending Prescriptions Disp Refills     lisinopril (PRINIVIL/ZESTRIL) 10 MG tablet [Pharmacy Med Name: LISINOPRIL 10 MG TABLET] 90 tablet 0     Sig: TAKE 1 TABLET BY MOUTH EVERY DAY (STOP TAKING AMLODIPINE)       ACE Inhibitors (Including Combos) Protocol Failed - 8/14/2019  1:36 AM        Failed - Medication is active on med list        Passed - Blood pressure under 140/90 in past 12 months     BP Readings from Last 3 Encounters:   06/27/19 132/79   05/25/18 112/70   05/11/18 128/86                 Passed - Recent (12 mo) or future (30 days) visit within the authorizing provider's specialty     Patient had office visit in the last 12 months or has a visit in the next 30 days with authorizing provider or within the authorizing provider's specialty.  See \"Patient Info\" tab in inbasket, or \"Choose Columns\" in Meds & Orders section of the refill encounter.              Passed - Patient is age 18 or older        Passed - No active pregnancy on record        Passed - Normal serum creatinine on file in past 12 months     Recent Labs   Lab Test 06/27/19  1008   CR 1.02             Passed - Normal serum potassium on file in past 12 months     Recent Labs   Lab Test 06/27/19  1008   POTASSIUM 3.9             Passed - No positive pregnancy test within past 12 months        Last Written Prescription Date:  11/16/18 (d/c'd 06/27/19)  Last Fill Quantity: 90,  # refills: 2   Last office visit: 6/27/2019 with prescribing provider:     Future Office Visit:      Vi Betts MA    "

## 2019-08-15 RX ORDER — LISINOPRIL 10 MG/1
TABLET ORAL
Qty: 90 TABLET | Refills: 0 | OUTPATIENT
Start: 2019-08-15

## 2019-09-26 RX ORDER — LISINOPRIL 10 MG/1
TABLET ORAL
Qty: 90 TABLET | Refills: 0 | OUTPATIENT
Start: 2019-09-26

## 2019-09-26 NOTE — TELEPHONE ENCOUNTER
"lisinopril (PRINIVIL/ZESTRIL) 10 MG tablet (Discontinued) 90 tablet 2 11/16/2018 6/27/2019     Last Written Prescription Date:  11/16/2018  Last Fill Quantity: 90,  # refills: 2   Last office visit: 6/27/2019 with prescribing provider: CAROLYN Mariee    Future Office Visit: Unknown   Requested Prescriptions   Pending Prescriptions Disp Refills     lisinopril (PRINIVIL/ZESTRIL) 10 MG tablet [Pharmacy Med Name: LISINOPRIL 10 MG TABLET] 90 tablet 0     Sig: TAKE 1 TABLET BY MOUTH EVERY DAY (STOP TAKING AMLODIPINE)       ACE Inhibitors (Including Combos) Protocol Failed - 9/26/2019  9:27 AM        Failed - Medication is active on med list        Passed - Blood pressure under 140/90 in past 12 months     BP Readings from Last 3 Encounters:   06/27/19 132/79   05/25/18 112/70   05/11/18 128/86                 Passed - Recent (12 mo) or future (30 days) visit within the authorizing provider's specialty     Patient had office visit in the last 12 months or has a visit in the next 30 days with authorizing provider or within the authorizing provider's specialty.  See \"Patient Info\" tab in inbasket, or \"Choose Columns\" in Meds & Orders section of the refill encounter.              Passed - Patient is age 18 or older        Passed - No active pregnancy on record        Passed - Normal serum creatinine on file in past 12 months     Recent Labs   Lab Test 06/27/19  1008   CR 1.02             Passed - Normal serum potassium on file in past 12 months     Recent Labs   Lab Test 06/27/19  1008   POTASSIUM 3.9             Passed - No positive pregnancy test within past 12 months          "

## 2019-10-16 ENCOUNTER — TELEPHONE (OUTPATIENT)
Dept: FAMILY MEDICINE | Facility: CLINIC | Age: 74
End: 2019-10-16

## 2019-11-15 ENCOUNTER — TELEPHONE (OUTPATIENT)
Dept: FAMILY MEDICINE | Facility: CLINIC | Age: 74
End: 2019-11-15

## 2019-11-15 DIAGNOSIS — E11.9 TYPE 2 DIABETES MELLITUS WITHOUT COMPLICATION, WITHOUT LONG-TERM CURRENT USE OF INSULIN (H): ICD-10-CM

## 2019-11-15 DIAGNOSIS — R30.0 DYSURIA: Primary | ICD-10-CM

## 2019-11-15 DIAGNOSIS — R30.0 DYSURIA: ICD-10-CM

## 2019-11-15 LAB
ALBUMIN UR-MCNC: NEGATIVE MG/DL
APPEARANCE UR: CLEAR
BILIRUB UR QL STRIP: NEGATIVE
COLOR UR AUTO: YELLOW
GLUCOSE UR STRIP-MCNC: >=1000 MG/DL
HGB UR QL STRIP: NEGATIVE
KETONES UR STRIP-MCNC: NEGATIVE MG/DL
LEUKOCYTE ESTERASE UR QL STRIP: NEGATIVE
NITRATE UR QL: NEGATIVE
PH UR STRIP: 7 PH (ref 5–7)
RBC #/AREA URNS AUTO: ABNORMAL /HPF
SOURCE: ABNORMAL
SP GR UR STRIP: 1.01 (ref 1–1.03)
UROBILINOGEN UR STRIP-ACNC: 0.2 EU/DL (ref 0.2–1)
WBC #/AREA URNS AUTO: ABNORMAL /HPF

## 2019-11-15 PROCEDURE — 81001 URINALYSIS AUTO W/SCOPE: CPT | Performed by: INTERNAL MEDICINE

## 2019-11-15 NOTE — TELEPHONE ENCOUNTER
Call out to patient gave her instructions per PCP.  She will be here for UAUC before 2 PM.    Also will make an appointment for diabetic educator and followup with Dr. Mariee when she comes in today.  Left her referral information at the .    Lavern Esparza RN

## 2019-11-15 NOTE — TELEPHONE ENCOUNTER
Reason for call:  Patient reporting a symptom    Symptom or request: Frequent urination     Duration (how long have symptoms been present): A week     Have you been treated for this before?     Additional comments:     Phone Number patient can be reached at:  Home number on file [phone] (home)    Best Time:  Anytime     Can we leave a detailed message on this number:  YES    Call taken on 11/15/2019 at 9:25 AM by Melissa Thompson

## 2019-11-15 NOTE — TELEPHONE ENCOUNTER
UA UC is ordered  We have talked to her about diabetic educator referral  I again put the referral  Provide her phone number to make the appointment  she does not need to check sugar 4 times a day if sugars are under control  Her diabetes is not under control  She is due for office visit  It will be very beneficial if she sees diabetic educator they can teach her how to use the meter for blood sugar check  They  can educate her about diabetes  Is due for follow-up appointment  Dr.Nasima Jaylene MD

## 2019-11-15 NOTE — TELEPHONE ENCOUNTER
ALEKESY  I spoke to the patient  Told her that her diabetes is not under control  Urine is positive for glucose but negative for infection  It is very important she make appointment with diabetic educator  It is very important she schedule an appointment to see me sooner than what is a scheduled  She needs to start the medication  She needs to start checking her blood sugar  In the past she was resistant to that idea and keep declining that  She said she will call on Monday to make the appointment to see diabetic educator  Dr.Nasima Jaylene MD

## 2019-11-15 NOTE — TELEPHONE ENCOUNTER
Tonia Villavicencio is a 74 year old female who complains of urinary frequency, urgency and dysuria x 7 days, without flank pain, fever, chills, or abnormal vaginal discharge or bleeding.     Patient upset she had to purchase depends because of the urgency and frequency. States she has slight burning with it.  She states her urine clear colored and barely yellow, she has been drinking plenty of fluids.     She is also upset about not checking her BS QID as instructed because she doesn't know how to and want to get extra help with that.    Please advise regarding UA/UC, pharmacy pended, and possibly a referral with [facility]ina??    Lavern Esparza RN

## 2019-11-25 ENCOUNTER — TRANSFERRED RECORDS (OUTPATIENT)
Dept: HEALTH INFORMATION MANAGEMENT | Facility: CLINIC | Age: 74
End: 2019-11-25

## 2019-11-25 LAB — RETINOPATHY: NEGATIVE

## 2019-12-03 ENCOUNTER — OFFICE VISIT (OUTPATIENT)
Dept: FAMILY MEDICINE | Facility: CLINIC | Age: 74
End: 2019-12-03
Payer: COMMERCIAL

## 2019-12-03 VITALS
OXYGEN SATURATION: 99 % | HEART RATE: 66 BPM | BODY MASS INDEX: 28.42 KG/M2 | SYSTOLIC BLOOD PRESSURE: 128 MMHG | DIASTOLIC BLOOD PRESSURE: 78 MMHG | HEIGHT: 70 IN | TEMPERATURE: 97 F | WEIGHT: 198.5 LBS

## 2019-12-03 DIAGNOSIS — E11.65 POORLY CONTROLLED DIABETES MELLITUS (H): Primary | ICD-10-CM

## 2019-12-03 DIAGNOSIS — Z13.0 SCREENING FOR DEFICIENCY ANEMIA: ICD-10-CM

## 2019-12-03 DIAGNOSIS — R35.0 URINARY FREQUENCY: ICD-10-CM

## 2019-12-03 DIAGNOSIS — E11.65 POORLY CONTROLLED TYPE 2 DIABETES MELLITUS (H): ICD-10-CM

## 2019-12-03 LAB
ALBUMIN SERPL-MCNC: 3.6 G/DL (ref 3.4–5)
ALBUMIN UR-MCNC: NEGATIVE MG/DL
ALP SERPL-CCNC: 90 U/L (ref 40–150)
ALT SERPL W P-5'-P-CCNC: 37 U/L (ref 0–50)
ANION GAP SERPL CALCULATED.3IONS-SCNC: 12 MMOL/L (ref 3–14)
APPEARANCE UR: CLEAR
AST SERPL W P-5'-P-CCNC: 19 U/L (ref 0–45)
BACTERIA #/AREA URNS HPF: ABNORMAL /HPF
BILIRUB SERPL-MCNC: 0.7 MG/DL (ref 0.2–1.3)
BILIRUB UR QL STRIP: NEGATIVE
BUN SERPL-MCNC: 37 MG/DL (ref 7–30)
CALCIUM SERPL-MCNC: 9.8 MG/DL (ref 8.5–10.1)
CHLORIDE SERPL-SCNC: 90 MMOL/L (ref 94–109)
CHOLEST SERPL-MCNC: 312 MG/DL
CO2 SERPL-SCNC: 26 MMOL/L (ref 20–32)
COLOR UR AUTO: YELLOW
CREAT SERPL-MCNC: 1.04 MG/DL (ref 0.52–1.04)
ERYTHROCYTE [DISTWIDTH] IN BLOOD BY AUTOMATED COUNT: 12.9 % (ref 10–15)
GFR SERPL CREATININE-BSD FRML MDRD: 52 ML/MIN/{1.73_M2}
GLUCOSE SERPL-MCNC: 508 MG/DL (ref 70–99)
GLUCOSE UR STRIP-MCNC: >=1000 MG/DL
HBA1C MFR BLD: >15 % (ref 0–5.6)
HCT VFR BLD AUTO: 43.1 % (ref 35–47)
HDLC SERPL-MCNC: 38 MG/DL
HGB BLD-MCNC: 14.6 G/DL (ref 11.7–15.7)
HGB UR QL STRIP: NEGATIVE
KETONES UR STRIP-MCNC: 15 MG/DL
LDLC SERPL CALC-MCNC: ABNORMAL MG/DL (ref 0–130)
LDLC SERPL DIRECT ASSAY-MCNC: 192 MG/DL
LEUKOCYTE ESTERASE UR QL STRIP: NEGATIVE
MCH RBC QN AUTO: 29.9 PG (ref 26.5–33)
MCHC RBC AUTO-ENTMCNC: 33.9 G/DL (ref 31.5–36.5)
MCV RBC AUTO: 88 FL (ref 78–100)
NITRATE UR QL: NEGATIVE
NON-SQ EPI CELLS #/AREA URNS LPF: ABNORMAL /LPF
NONHDLC SERPL-MCNC: 274 MG/DL
PH UR STRIP: 7 PH (ref 5–7)
PLATELET # BLD AUTO: 251 10E9/L (ref 150–450)
POTASSIUM SERPL-SCNC: 4.2 MMOL/L (ref 3.4–5.3)
PROT SERPL-MCNC: 7.3 G/DL (ref 6.8–8.8)
RBC # BLD AUTO: 4.88 10E12/L (ref 3.8–5.2)
RBC #/AREA URNS AUTO: ABNORMAL /HPF
SODIUM SERPL-SCNC: 128 MMOL/L (ref 133–144)
SOURCE: ABNORMAL
SP GR UR STRIP: 1.01 (ref 1–1.03)
TRIGL SERPL-MCNC: 503 MG/DL
UROBILINOGEN UR STRIP-ACNC: 0.2 EU/DL (ref 0.2–1)
WBC # BLD AUTO: 5.3 10E9/L (ref 4–11)
WBC #/AREA URNS AUTO: ABNORMAL /HPF

## 2019-12-03 PROCEDURE — 83036 HEMOGLOBIN GLYCOSYLATED A1C: CPT | Performed by: INTERNAL MEDICINE

## 2019-12-03 PROCEDURE — 85027 COMPLETE CBC AUTOMATED: CPT | Performed by: INTERNAL MEDICINE

## 2019-12-03 PROCEDURE — 99207 C PAF COMPLETED  NO CHARGE: CPT | Performed by: INTERNAL MEDICINE

## 2019-12-03 PROCEDURE — 80061 LIPID PANEL: CPT | Performed by: INTERNAL MEDICINE

## 2019-12-03 PROCEDURE — 99215 OFFICE O/P EST HI 40 MIN: CPT | Performed by: INTERNAL MEDICINE

## 2019-12-03 PROCEDURE — 36415 COLL VENOUS BLD VENIPUNCTURE: CPT | Performed by: INTERNAL MEDICINE

## 2019-12-03 PROCEDURE — 80053 COMPREHEN METABOLIC PANEL: CPT | Performed by: INTERNAL MEDICINE

## 2019-12-03 PROCEDURE — 83721 ASSAY OF BLOOD LIPOPROTEIN: CPT | Mod: 59 | Performed by: INTERNAL MEDICINE

## 2019-12-03 PROCEDURE — 81001 URINALYSIS AUTO W/SCOPE: CPT | Performed by: INTERNAL MEDICINE

## 2019-12-03 RX ORDER — METFORMIN HCL 500 MG
2000 TABLET, EXTENDED RELEASE 24 HR ORAL
Qty: 120 TABLET | Refills: 3 | Status: SHIPPED | OUTPATIENT
Start: 2019-12-03 | End: 2020-03-06

## 2019-12-03 RX ORDER — GLUCOSAMINE HCL/CHONDROITIN SU 500-400 MG
CAPSULE ORAL
Qty: 100 EACH | Refills: 3 | Status: SHIPPED | OUTPATIENT
Start: 2019-12-03

## 2019-12-03 ASSESSMENT — MIFFLIN-ST. JEOR: SCORE: 1472.7

## 2019-12-03 NOTE — PATIENT INSTRUCTIONS
Start with one tablet of metformin daily for one week and increase to two tablets daily for one week then do 3 tablets daily for one week then go to 4 tablets daily as maintenance   You should take OTC vitamin B12 1000 micrograms every day.  You are on Metformin that affects the absorption of vitamin B12.  Keep appointment with diabetic educator  Follow up in 2 months  Seek sooner medical attention if there is any worsening of symptoms or problems.

## 2019-12-04 ENCOUNTER — OFFICE VISIT (OUTPATIENT)
Dept: FAMILY MEDICINE | Facility: CLINIC | Age: 74
End: 2019-12-04
Payer: COMMERCIAL

## 2019-12-04 ENCOUNTER — TELEPHONE (OUTPATIENT)
Dept: FAMILY MEDICINE | Facility: CLINIC | Age: 74
End: 2019-12-04

## 2019-12-04 VITALS
SYSTOLIC BLOOD PRESSURE: 139 MMHG | TEMPERATURE: 97.1 F | WEIGHT: 198 LBS | OXYGEN SATURATION: 99 % | HEIGHT: 70 IN | HEART RATE: 85 BPM | BODY MASS INDEX: 28.35 KG/M2 | DIASTOLIC BLOOD PRESSURE: 85 MMHG

## 2019-12-04 DIAGNOSIS — E11.65 POORLY CONTROLLED DIABETES MELLITUS (H): Primary | ICD-10-CM

## 2019-12-04 DIAGNOSIS — Z79.899 MEDICATION MANAGEMENT: ICD-10-CM

## 2019-12-04 PROCEDURE — 99215 OFFICE O/P EST HI 40 MIN: CPT | Performed by: INTERNAL MEDICINE

## 2019-12-04 ASSESSMENT — MIFFLIN-ST. JEOR: SCORE: 1470.43

## 2019-12-04 NOTE — PATIENT INSTRUCTIONS
Use novolog 4 units before each meal. Breakfast , lunch and dinner  Use lantus insulin 10 units at bed time  Keep appointment with diabetic educator as scheduled for tomorrow  Follow up with me in one month  Seek sooner medical attention if there is any worsening of symptoms or problems.      Patient Education     Hypoglycemia (Low Blood Sugar)     Fast-acting sugar includes a cup of nonfat milk.   Too little sugar (glucose) in your blood is called hypoglycemia or low blood sugar. Low blood sugar usually means anything lower than 70 mg/dL. Talk with your healthcare provider about your target range and what level is too low for you. Diabetes itself doesn t cause low blood sugar. But some of the treatments for diabetes, such as pills or insulin, may raise your risk for it. Low blood sugar may cause you to pass out or have a seizure. So always treat low blood sugar right away, but don't overeat.  Special note: Always carry a source of fast-acting sugar and a snack in case of hypoglycemia.   What you may notice  If you have low blood sugar, you may have one or more of these symptoms:    Shakiness or dizziness    Cold, clammy skin or sweating    Feelings of hunger    Headache    Nervousness    A hard, fast heartbeat    Weakness    Confusion or irritability    Blurred vision    Having nightmares or waking up confused or sweating    Numbness or tingling in the lips or tongue  What you should do  Here are tips to follow if you have hypoglycemia:     First check your blood sugar. If it is too low (out of your target range), eat or drink 15 to 20 grams of fast-acting sugar. This may be 3 to 4 glucose tablets, 4 ounces (half a cup) of fruit juice or regular (nondiet) soda, 8 ounces (1 cup) of fat-free milk, or 1 tablespoon of honey. Don t take more than this, or your blood sugar may go too high.    Wait 15 minutes. Then recheck your blood sugar if you can.    If your blood sugar is still too low, repeat the steps above and  check your blood sugar again. If your blood sugar still has not returned to your target range, contact your healthcare provider or seek emergency care.    Once your blood sugar returns to target range, eat a snack or meal.  Preventing low blood sugar  Things you can do include the following:     If your condition needs a strict treatment plan, eat your meals and snacks at the same times each day. Don t skip meals!    If your treatment plan lets you change when you eat and what you eat, learn how to change the time and dose of your rapid-acting insulin to match this.     Ask your healthcare provider if it is safe for you to drink alcohol. Never drink on an empty stomach.    Take your medicine at the prescribed times.    Always carry a source of fast-acting sugar and a snack when you re away from home.  Other things to do  Additional tips include the following:    Carry a medical ID card, a compact USB drive, or wear a medical alert bracelet or necklace. It should say that you have diabetes. It should also say what to do if you pass out or have a seizure.    Make sure your family, friends, and coworkers know the signs of low blood sugar. Tell them what to do if your blood sugar falls very low and you can t treat yourself.    Keep a glucagon emergency kit handy. Be sure your family, friends, and coworkers know how and when to use it. Check it regularly and replace the glucagon before it expires.    Talk with your health care team about other things you can do to prevent low blood sugar.     If you have unexplained hypoglycemia or hypoglycemia several times, call your healthcare provider.   Date Last Reviewed: 5/1/2016 2000-2018 [employer]. [address] [place] [address], [place] [address]. All rights reserved. This information is not intended as a substitute for professional medical care. Always follow your healthcare professional's instructions.           Patient Education     Hypoglycemia (Low Blood  Sugar)     Fast-acting sugar includes a cup of nonfat milk.   Too little sugar (glucose) in your blood is called hypoglycemia or low blood sugar. Low blood sugar usually means anything lower than 70 mg/dL. Talk with your healthcare provider about your target range and what level is too low for you. Diabetes itself doesn t cause low blood sugar. But some of the treatments for diabetes, such as pills or insulin, may raise your risk for it. Low blood sugar may cause you to pass out or have a seizure. So always treat low blood sugar right away, but don't overeat.  Special note: Always carry a source of fast-acting sugar and a snack in case of hypoglycemia.   What you may notice  If you have low blood sugar, you may have one or more of these symptoms:    Shakiness or dizziness    Cold, clammy skin or sweating    Feelings of hunger    Headache    Nervousness    A hard, fast heartbeat    Weakness    Confusion or irritability    Blurred vision    Having nightmares or waking up confused or sweating    Numbness or tingling in the lips or tongue  What you should do  Here are tips to follow if you have hypoglycemia:     First check your blood sugar. If it is too low (out of your target range), eat or drink 15 to 20 grams of fast-acting sugar. This may be 3 to 4 glucose tablets, 4 ounces (half a cup) of fruit juice or regular (nondiet) soda, 8 ounces (1 cup) of fat-free milk, or 1 tablespoon of honey. Don t take more than this, or your blood sugar may go too high.    Wait 15 minutes. Then recheck your blood sugar if you can.    If your blood sugar is still too low, repeat the steps above and check your blood sugar again. If your blood sugar still has not returned to your target range, contact your healthcare provider or seek emergency care.    Once your blood sugar returns to target range, eat a snack or meal.  Preventing low blood sugar  Things you can do include the following:     If your condition needs a strict treatment  plan, eat your meals and snacks at the same times each day. Don t skip meals!    If your treatment plan lets you change when you eat and what you eat, learn how to change the time and dose of your rapid-acting insulin to match this.     Ask your healthcare provider if it is safe for you to drink alcohol. Never drink on an empty stomach.    Take your medicine at the prescribed times.    Always carry a source of fast-acting sugar and a snack when you re away from home.  Other things to do  Additional tips include the following:    Carry a medical ID card, a compact USB drive, or wear a medical alert bracelet or necklace. It should say that you have diabetes. It should also say what to do if you pass out or have a seizure.    Make sure your family, friends, and coworkers know the signs of low blood sugar. Tell them what to do if your blood sugar falls very low and you can t treat yourself.    Keep a glucagon emergency kit handy. Be sure your family, friends, and coworkers know how and when to use it. Check it regularly and replace the glucagon before it expires.    Talk with your health care team about other things you can do to prevent low blood sugar.     If you have unexplained hypoglycemia or hypoglycemia several times, call your healthcare provider.   Date Last Reviewed: 5/1/2016 2000-2018 [employer]. [address] [place] [address], [place] [address]. All rights reserved. This information is not intended as a substitute for professional medical care. Always follow your healthcare professional's instructions.           Patient Education   Sample Meal Plan for Diabetes  Breakfast (4 carb choices, 60 grams carbohydrate)  Coffee, tea or water  1 cup (8 ounces) skim or 1% milk (1-carb choice)  1 small piece of fresh fruit or 1 cup berries (1-carb choice)  Any one of the following (2-carb choice):    1 slice toast with 1 tablespoon of margarine or peanut butter and   cup of cereal with skim or 1% milk    1 cup  cereal with skim or 1% milk    1 egg and 2 slices toast with 1 tablespoon margarine or peanut butter  Lunch (4 carb choices, 60 grams carbohydrate)  Coffee, tea or water  1 cup (8 ounces) skim or 1% milk (1-carb choice)  Small piece fresh fruit or 1 cup berries (1-carb choice)  Raw vegetables (add to sandwich or serve on the side)  Any one of the following (2-carb choice):     Heath (made with 2 slices whole-grain bread)      sandwich with 1 cup soup    2 corn tortillas with meat and vegetables  Dinner (4 carb choices, 60 grams carbohydrate)  Coffee, tea or water  Breast of chicken or pork chop (3 to 4 ounces)  Cooked vegetable  Tossed salad with small amount of low-fat dressing  1 cup (8 ounces) skim or 1% milk (1-carb choice)  1 small piece fruit or   cup canned fruit, packed in juice or light syrup (1-carb choice)  Any one of the following (2-carb choice):    Medium baked potato    1 cup mashed potato    2/3 cup rice or pasta  Snacks (1 or 2 carb choices, 15 to 30 grams carbohydrate)  Any one or two of the following:    Small piece fresh fruit    3 dinorah cracker squares    1 cup raw vegetables with low-fat dip    1 ounce (12 to 15) baked chips with salsa    Light yogurt (100 calories)    1 cup (8 ounces) skim or 1% milk    3 cups popped popcorn    6 vanilla wafers  For informational purposes only. Not to replace the advice of your health care provider.   Copyright   2007 [facility]. All rights reserved. [employer] 066993 - REV 04/16.

## 2019-12-04 NOTE — RESULT ENCOUNTER NOTE
Spoke to patient and explained result   Advised that diabetes is poorly controlled   This is indication for starting insulin.  She will pick insulin from pharmacy and will come tomorrow for training at 1230  She will keep appointment for Thursday with diabetic educator.

## 2019-12-04 NOTE — TELEPHONE ENCOUNTER
Talked with patient and patient as agreed to come in at 12 per PCP.    Paulette Jovel MA 10:00 AM December 4, 2019

## 2019-12-04 NOTE — PROGRESS NOTES
Subjective     Tonia Villavicencio is a 74 year old female who presents to clinic today for the following health issues:    HPI   Diabetes Follow-up      BP Readings from Last 2 Encounters:   12/04/19 139/85   12/03/19 128/78     Hemoglobin A1C (%)   Date Value   12/03/2019 >15.0 (H)   06/27/2019 7.9 (H)     LDL Cholesterol Calculated (mg/dL)   Date Value   12/03/2019     Cannot estimate LDL when triglyceride exceeds 400 mg/dL   06/27/2019 173 (H)     LDL Cholesterol Direct (mg/dL)   Date Value   12/03/2019 192 (H)     Yesterday we informed her her diabetes is under control as A1c was greater than 15%  She came here today to get the training about the insulin  She does have an appointment to see diabetic educator tomorrow  But I wanted her to start the insulin today to avoid hospital admission  Patient brought her glucometer and supplies  She also brought her insulin and we put that in the fridge      Patient Active Problem List   Diagnosis     Essential hypertension     Hyperlipidemia     Type 2 diabetes mellitus without complication, without long-term current use of insulin (H)     Obesity, unspecified classification, unspecified obesity type, unspecified whether serious comorbidity present     CKD (chronic kidney disease) stage 3, GFR 30-59 ml/min (H)     Past Surgical History:   Procedure Laterality Date     ARTHROSCOPY KNEE       GYN SURGERY      ovarian cyst removal and ovary       Social History     Tobacco Use     Smoking status: Never Smoker     Smokeless tobacco: Never Used   Substance Use Topics     Alcohol use: No     Alcohol/week: 0.0 standard drinks     Family History   Problem Relation Age of Onset     Heart Disease Mother      Prostate Cancer Father      Diabetes Brother      Breast Cancer No family hx of      Colon Cancer No family hx of          Current Outpatient Medications   Medication Sig Dispense Refill     alcohol swab prep pads Use to swab area of injection/cheryl as directed. 100 each 3      aspirin 81 MG EC tablet Take 1 tablet (81 mg) by mouth daily 90 tablet 3     blood glucose (NO BRAND SPECIFIED) lancets standard Use to test blood sugar 1 times daily or as directed. 100 each 1     blood glucose (NO BRAND SPECIFIED) lancets standard Use to test blood sugar 1 times daily or as directed. 100 each 1     blood glucose (NO BRAND SPECIFIED) test strip Use to test blood sugar 1 times daily or as directed. 100 each 1     blood glucose monitoring (NO BRAND SPECIFIED) meter device kit Use to test blood sugar as directed. 1 kit 0     blood glucose monitoring (NO BRAND SPECIFIED) meter device kit Use to test blood sugar one times daily or as directed. 1 kit 0     blood glucose monitoring (NO BRAND SPECIFIED) test strip Use to test blood sugars one times daily or as directed 100 strip 3     insulin aspart (NOVOLOG FLEXPEN) 100 UNIT/ML pen 4 units before breakfast, 4 units before lunch, 4 units before dinner 15 mL 3     insulin glargine (LANTUS SOLOSTAR) 100 UNIT/ML pen Inject 10 Units Subcutaneous At Bedtime Lantus Solostar Pen 15 mL 1     insulin pen needle (ULTICARE MICRO) 32G X 4 MM miscellaneous Use 4 pen needles daily or as directed. 100 each 3     losartan (COZAAR) 25 MG tablet Take 1 tablet (25 mg) by mouth daily 90 tablet 1     metFORMIN (GLUCOPHAGE-XR) 500 MG 24 hr tablet Take 4 tablets (2,000 mg) by mouth daily (with dinner) Start with one tablet daily and increase one tablet  weekly upto 4 tablets daily as maintenance 120 tablet 3     metoprolol tartrate (LOPRESSOR) 50 MG tablet Take 1 tablet (50 mg) by mouth 2 times daily 180 tablet 3     ondansetron (ZOFRAN ODT) 4 MG ODT tab Take 1-2 tablets (4-8 mg) by mouth every 8 hours as needed for nausea 20 tablet 1     STATIN NOT PRESCRIBED, INTENTIONAL, Please choose reason not prescribed, below       triamterene-HCTZ (MAXZIDE) 75-50 MG tablet Take 1 tablet by mouth daily 90 tablet 3         Reviewed and updated as needed this visit by Provider      "    Review of Systems   ROS COMP: Constitutional, HEENT, cardiovascular, pulmonary, gi and gu systems are negative, except as otherwise noted.      Objective    /85 (BP Location: Right arm, Patient Position: Sitting, Cuff Size: Adult Regular)   Pulse 85   Temp 97.1  F (36.2  C) (Oral)   Ht 1.765 m (5' 9.5\")   Wt 89.8 kg (198 lb)   LMP  (LMP Unknown)   SpO2 99%   Breastfeeding No   BMI 28.82 kg/m    Body mass index is 28.82 kg/m .  Physical Exam   She is nice and pleasant comfortable not in any kind of distress            Assessment & Plan     Tonia was seen today for diabetes.    Diagnoses and all orders for this visit:    Poorly controlled diabetes mellitus (H);  Lab Results   Component Value Date    A1C >15.0 12/03/2019    A1C 7.9 06/27/2019    A1C 7.9 04/30/2018     She is going to start insulin which is new for her  I used demo pen and needles  Trained her how to do insulin shots    She verbalized understanding and used those demo pens and injected in the test medium  I also trained her how to use her glucometer  She herself did it and was able to check her sugar    I also trained her about freestyle tracy  We put the sensor  She will use her smart phone as a reader  I help to download the arthur  I talked to diabetic educator and she told me about that and she gave me one sample of freestyle tracy  For at least for the next 14 days she would be able to check her sugar more often  As she is new to insulin monitoring more frequently would be very beneficial      She is advised to keep her appointment tomorrow as a scheduled with diabetic educator    I answered all her questions  She was very appreciative    She actually got her insulin pen and injected herself with 4 units and felt very comfortable doing it.    She practiced with demo penss multiple times on the test medium.      Medication management         BMI:   Estimated body mass index is 28.82 kg/m  as calculated from the following:    " "Height as of this encounter: 1.765 m (5' 9.5\").    Weight as of this encounter: 89.8 kg (198 lb).     The total visit time was 45 minutes more  than 50% was spent in counseling and coordination of care as discussed above.    Disclaimer: This note consists of symbols derived from keyboarding, dictation and/or voice recognition software. As a result, there may be errors in the script that have gone undetected. Please consider this when interpreting information found in this chart.      Patient Instructions     Use novolog 4 units before each meal. Breakfast , lunch and dinner  Use lantus insulin 10 units at bed time  Keep appointment with diabetic educator as scheduled for tomorrow  Follow up with me in one month  Seek sooner medical attention if there is any worsening of symptoms or problems.      Patient Education     Hypoglycemia (Low Blood Sugar)     Fast-acting sugar includes a cup of nonfat milk.   Too little sugar (glucose) in your blood is called hypoglycemia or low blood sugar. Low blood sugar usually means anything lower than 70 mg/dL. Talk with your healthcare provider about your target range and what level is too low for you. Diabetes itself doesn t cause low blood sugar. But some of the treatments for diabetes, such as pills or insulin, may raise your risk for it. Low blood sugar may cause you to pass out or have a seizure. So always treat low blood sugar right away, but don't overeat.  Special note: Always carry a source of fast-acting sugar and a snack in case of hypoglycemia.   What you may notice  If you have low blood sugar, you may have one or more of these symptoms:    Shakiness or dizziness    Cold, clammy skin or sweating    Feelings of hunger    Headache    Nervousness    A hard, fast heartbeat    Weakness    Confusion or irritability    Blurred vision    Having nightmares or waking up confused or sweating    Numbness or tingling in the lips or tongue  What you should do  Here are tips to follow " if you have hypoglycemia:     First check your blood sugar. If it is too low (out of your target range), eat or drink 15 to 20 grams of fast-acting sugar. This may be 3 to 4 glucose tablets, 4 ounces (half a cup) of fruit juice or regular (nondiet) soda, 8 ounces (1 cup) of fat-free milk, or 1 tablespoon of honey. Don t take more than this, or your blood sugar may go too high.    Wait 15 minutes. Then recheck your blood sugar if you can.    If your blood sugar is still too low, repeat the steps above and check your blood sugar again. If your blood sugar still has not returned to your target range, contact your healthcare provider or seek emergency care.    Once your blood sugar returns to target range, eat a snack or meal.  Preventing low blood sugar  Things you can do include the following:     If your condition needs a strict treatment plan, eat your meals and snacks at the same times each day. Don t skip meals!    If your treatment plan lets you change when you eat and what you eat, learn how to change the time and dose of your rapid-acting insulin to match this.     Ask your healthcare provider if it is safe for you to drink alcohol. Never drink on an empty stomach.    Take your medicine at the prescribed times.    Always carry a source of fast-acting sugar and a snack when you re away from home.  Other things to do  Additional tips include the following:    Carry a medical ID card, a compact USB drive, or wear a medical alert bracelet or necklace. It should say that you have diabetes. It should also say what to do if you pass out or have a seizure.    Make sure your family, friends, and coworkers know the signs of low blood sugar. Tell them what to do if your blood sugar falls very low and you can t treat yourself.    Keep a glucagon emergency kit handy. Be sure your family, friends, and coworkers know how and when to use it. Check it regularly and replace the glucagon before it expires.    Talk with your  health care team about other things you can do to prevent low blood sugar.     If you have unexplained hypoglycemia or hypoglycemia several times, call your healthcare provider.   Date Last Reviewed: 5/1/2016 2000-2018 [employer]. [address] [place] [address], [place] [address]. All rights reserved. This information is not intended as a substitute for professional medical care. Always follow your healthcare professional's instructions.           Patient Education     Hypoglycemia (Low Blood Sugar)     Fast-acting sugar includes a cup of nonfat milk.   Too little sugar (glucose) in your blood is called hypoglycemia or low blood sugar. Low blood sugar usually means anything lower than 70 mg/dL. Talk with your healthcare provider about your target range and what level is too low for you. Diabetes itself doesn t cause low blood sugar. But some of the treatments for diabetes, such as pills or insulin, may raise your risk for it. Low blood sugar may cause you to pass out or have a seizure. So always treat low blood sugar right away, but don't overeat.  Special note: Always carry a source of fast-acting sugar and a snack in case of hypoglycemia.   What you may notice  If you have low blood sugar, you may have one or more of these symptoms:    Shakiness or dizziness    Cold, clammy skin or sweating    Feelings of hunger    Headache    Nervousness    A hard, fast heartbeat    Weakness    Confusion or irritability    Blurred vision    Having nightmares or waking up confused or sweating    Numbness or tingling in the lips or tongue  What you should do  Here are tips to follow if you have hypoglycemia:     First check your blood sugar. If it is too low (out of your target range), eat or drink 15 to 20 grams of fast-acting sugar. This may be 3 to 4 glucose tablets, 4 ounces (half a cup) of fruit juice or regular (nondiet) soda, 8 ounces (1 cup) of fat-free milk, or 1 tablespoon of honey. Don t take more than this, or  your blood sugar may go too high.    Wait 15 minutes. Then recheck your blood sugar if you can.    If your blood sugar is still too low, repeat the steps above and check your blood sugar again. If your blood sugar still has not returned to your target range, contact your healthcare provider or seek emergency care.    Once your blood sugar returns to target range, eat a snack or meal.  Preventing low blood sugar  Things you can do include the following:     If your condition needs a strict treatment plan, eat your meals and snacks at the same times each day. Don t skip meals!    If your treatment plan lets you change when you eat and what you eat, learn how to change the time and dose of your rapid-acting insulin to match this.     Ask your healthcare provider if it is safe for you to drink alcohol. Never drink on an empty stomach.    Take your medicine at the prescribed times.    Always carry a source of fast-acting sugar and a snack when you re away from home.  Other things to do  Additional tips include the following:    Carry a medical ID card, a compact USB drive, or wear a medical alert bracelet or necklace. It should say that you have diabetes. It should also say what to do if you pass out or have a seizure.    Make sure your family, friends, and coworkers know the signs of low blood sugar. Tell them what to do if your blood sugar falls very low and you can t treat yourself.    Keep a glucagon emergency kit handy. Be sure your family, friends, and coworkers know how and when to use it. Check it regularly and replace the glucagon before it expires.    Talk with your health care team about other things you can do to prevent low blood sugar.     If you have unexplained hypoglycemia or hypoglycemia several times, call your healthcare provider.   Date Last Reviewed: 5/1/2016 2000-2018 [employer]. [address] [place] [address], [place] [address]. All rights reserved. This information is not intended as a  substitute for professional medical care. Always follow your healthcare professional's instructions.           Patient Education   Sample Meal Plan for Diabetes  Breakfast (4 carb choices, 60 grams carbohydrate)  Coffee, tea or water  1 cup (8 ounces) skim or 1% milk (1-carb choice)  1 small piece of fresh fruit or 1 cup berries (1-carb choice)  Any one of the following (2-carb choice):    1 slice toast with 1 tablespoon of margarine or peanut butter and   cup of cereal with skim or 1% milk    1 cup cereal with skim or 1% milk    1 egg and 2 slices toast with 1 tablespoon margarine or peanut butter  Lunch (4 carb choices, 60 grams carbohydrate)  Coffee, tea or water  1 cup (8 ounces) skim or 1% milk (1-carb choice)  Small piece fresh fruit or 1 cup berries (1-carb choice)  Raw vegetables (add to sandwich or serve on the side)  Any one of the following (2-carb choice):     [place] (made with 2 slices whole-grain bread)      sandwich with 1 cup soup    2 corn tortillas with meat and vegetables  Dinner (4 carb choices, 60 grams carbohydrate)  Coffee, tea or water  Breast of chicken or pork chop (3 to 4 ounces)  Cooked vegetable  Tossed salad with small amount of low-fat dressing  1 cup (8 ounces) skim or 1% milk (1-carb choice)  1 small piece fruit or   cup canned fruit, packed in juice or light syrup (1-carb choice)  Any one of the following (2-carb choice):    Medium baked potato    1 cup mashed potato    2/3 cup rice or pasta  Snacks (1 or 2 carb choices, 15 to 30 grams carbohydrate)  Any one or two of the following:    Small piece fresh fruit    3 dinorah cracker squares    1 cup raw vegetables with low-fat dip    1 ounce (12 to 15) baked chips with salsa    Light yogurt (100 calories)    1 cup (8 ounces) skim or 1% milk    3 cups popped popcorn    6 vanilla wafers  For informational purposes only. Not to replace the advice of your health care provider.   Copyright   2007 [employer]. All rights  reserved. [place] 134534 - REV 04/16.           No follow-ups on file.    Blanca Mariee MD  [facility]

## 2019-12-05 ENCOUNTER — ALLIED HEALTH/NURSE VISIT (OUTPATIENT)
Dept: EDUCATION SERVICES | Facility: CLINIC | Age: 74
End: 2019-12-05
Payer: COMMERCIAL

## 2019-12-05 DIAGNOSIS — E11.9 TYPE 2 DIABETES MELLITUS WITHOUT COMPLICATION, WITHOUT LONG-TERM CURRENT USE OF INSULIN (H): Primary | ICD-10-CM

## 2019-12-05 PROCEDURE — G0108 DIAB MANAGE TRN  PER INDIV: HCPCS

## 2019-12-05 RX ORDER — FLASH GLUCOSE SENSOR
1 KIT MISCELLANEOUS
Qty: 2 EACH | Refills: 11 | Status: SHIPPED | OUTPATIENT
Start: 2019-12-05 | End: 2019-12-27

## 2019-12-05 NOTE — PATIENT INSTRUCTIONS
Take Novolog 4 units with each meal.     Take Lantus 10 units at bedtime. Try to take at the same time.     Scan your glucose at least 4 times per day.    Try to aim for 30-45 grams of carb per meal.     For recipe ideas:  [email or web address]    [email or web address]    American heart association     American Diabetes Association     Bring blood glucose meter and logbook with you to all doctor and follow-up appointments.    Diabetes Education Telephone Visit Follow-up:    We realize your time is valuable and your health is important! We offer a convenient Telephone Visit follow up! It s a quick way to check in for a medication dose adjustment without having to come back to clinic as soon.    Telephone Visits are often covered by insurance. Please check with your insurance plan to see if this type of visit is covered. If not, the cost is less expensive than an office visit:      Up to 10 minutes (Code 94794): $30    11-20 minutes (Code 19297): $59    More than 20 minutes (Code 08636): $85    Talk with your Diabetes Educator if you want to learn more.      [facility]:  For Your Diabetes Education and Nutrition Appointments Call:  [phone]   For Diabetes Education or Nutrition Related Questions:   Phone: [phone]  E-mail: [email or web address]  Fax: [phone]   If you need a medication refill please contact your pharmacy. Please allow 3 business days for your refills to be completed.

## 2019-12-05 NOTE — PROGRESS NOTES
"  Diabetes Self-Management Education & Support    Diabetes Education Self Management & Training    SUBJECTIVE/OBJECTIVE:  Presents for: Individual review  Accompanied by: Self  Diabetes education in the past 24mo: Yes  Diabetes type: Type 2  Disease course: Worsening  How confident are you filling out medical forms by yourself:: Not Assessed  Diabetes management related comments/concerns: started on insulin yesterday. Can't get her BG meter to work now. Took 4 units of novolog at bedtime instead of 10 units of Lantus.   Transportation concerns: No  Other concerns:: None  Cultural Influences/Ethnic Background:  American    Diabetes Symptoms & Complications  Blurred vision: Yes  Fatigue: Yes  Polydipsia: Yes  Polyuria: Yes  Visual change: Yes  Symptom course: Worsening  Weight trend: Decreasing steadily       Patient Problem List and Family Medical History reviewed for relevant medical history, current medical status, and diabetes risk factors.    Vitals:  LMP  (LMP Unknown)   Estimated body mass index is 28.82 kg/m  as calculated from the following:    Height as of 12/4/19: 1.765 m (5' 9.5\").    Weight as of 12/4/19: 89.8 kg (198 lb).   Last 3 BP:   BP Readings from Last 3 Encounters:   12/04/19 139/85   12/03/19 128/78   06/27/19 132/79       History   Smoking Status     Never Smoker   Smokeless Tobacco     Never Used       Labs:  Lab Results   Component Value Date    A1C >15.0 12/03/2019     Lab Results   Component Value Date     12/03/2019     Lab Results   Component Value Date    LDL  12/03/2019     Cannot estimate LDL when triglyceride exceeds 400 mg/dL     12/03/2019     HDL Cholesterol   Date Value Ref Range Status   12/03/2019 38 (L) >49 mg/dL Final   ]  GFR Estimate   Date Value Ref Range Status   12/03/2019 52 (L) >60 mL/min/[1.73_m2] Corrected     Comment:     Starting 12/18/2018, serum creatinine based estimated GFR (eGFR) will be   calculated using the Chronic Kidney Disease Epidemiology " Collaboration   (CKD-EPI) equation.  CORRECTED ON 12/03 AT 1713: PREVIOUSLY REPORTED AS 53 Non  GFR   Calc Starting 12/18/2018, serum creatinine based estimated GFR (eGFR) will be   calculated using the Chronic Kidney Disease Epidemiology Collaboration (CKD   EPI) equation.       GFR Estimate If Black   Date Value Ref Range Status   12/03/2019 60 (L) >60 mL/min/[1.73_m2] Corrected     Comment:     Starting 12/18/2018, serum creatinine based estimated GFR (eGFR) will be   calculated using the Chronic Kidney Disease Epidemiology Collaboration   (CKD-EPI) equation.  CORRECTED ON 12/03 AT 1713: PREVIOUSLY REPORTED AS 61  GFR   Calc Starting 12/18/2018, serum creatinine based estimated GFR (eGFR) will be   calculated using the Chronic Kidney Disease Epidemiology Collaboration (CKD   EPI) equation.       Lab Results   Component Value Date    CR 1.04 12/03/2019     No results found for: MICROALBUMIN    Healthy Eating  Healthy Eating Assessed Today: Yes  Cultural/Tenriism diet restrictions?: No  Patient on a regular basis: Eats 3 meals a day    Being Active  Being Active Assessed Today: Yes  Exercise:: Currently not exercising    Monitoring  Monitoring Assessed Today: Yes  Did patient bring glucose meter to appointment? : Yes  Blood Glucose Meter: Accu-check, CGM  Blood glucose trend: Fluctuating dramtically  Low Glucose Range (mg/dL): >200  High Glucose Range (mg/dL): >200  Overall Range (mg/dL): >200          Taking Medications  Diabetes Medication(s)     Biguanides       metFORMIN (GLUCOPHAGE-XR) 500 MG 24 hr tablet    Take 4 tablets (2,000 mg) by mouth daily (with dinner) Start with one tablet daily and increase one tablet  weekly upto 4 tablets daily as maintenance    Insulin       insulin aspart (NOVOLOG FLEXPEN) 100 UNIT/ML pen    4 units before breakfast, 4 units before lunch, 4 units before dinner     insulin glargine (LANTUS SOLOSTAR) 100 UNIT/ML pen    Inject 10 Units Subcutaneous  At Bedtime Lantus Solostar Pen          Taking Medication Assessed Today: Yes  Current Treatments: Insulin Injections, Oral Agent (monotherapy)  Dose schedule: pre-breakfast, pre-lunch, pre-dinner, at bedtime  Given by: Patient  Injection/Infusion sites: Abdomen  Problems taking diabetes medications regularly?: No  Diabetes medication side effects?: No  Treatment Compliance: Most of the time    Problem Solving  Problem Solving Assessed Today: Yes  Hypoglycemia Frequency: Never    Reducing Risks  Reducing Risks Assessed Today: No    Healthy Coping  Healthy Coping Assessed Today: Yes  Emotional response to diabetes: Shock/Denial/Bargaining  Stage of change: ACTION (Actively working towards change)  Patient Activation Measure Survey Score:  No flowsheet data found.    ASSESSMENT:  Pt was started on insulin yesterday for her high blood sugars.  PCP gave her instruction on using a meter, insulin, and a personal tracy sensor which was started yesterday (provided in clinic).     Used pt's BG meter to check her BG today to see if it works. It worked just fine when we tested it.   today using meter. Apparently pt was trying to place her blood on TOP of the strip so it was not working so educated her on how to properly place the blood on the strip so it works.     She is liking the sensor and finds this so helpful. Linked to her account today so I can view her data. Provided her with a demo sensor again today and placed order for personal sensors (using her phone with the arthur).    Tonia was confused on which insulin to take at bed so she took 4 units novolog instead of 10 units Lantus.  Clarified her insulin types and dosing today during our visit and reviewed her insulin injection technique.      Reports her sx of high blood sugars already are getting better. Did not have to get up to use the bathroom as much last night. Was confused easily during our visit and is overwhelmed so tried to help simplify today and  provide encouragement. Praised her on using her sensor and for giving her novolog and gave her written instructions for her dosing and meal suggestions as reminders.     Patient's most recent   Lab Results   Component Value Date    A1C >15.0 12/03/2019    is not meeting goal of <7.0    INTERVENTION:   Diabetes knowledge and skills assessment:   Patient is knowledgeable in diabetes management concepts related to: Taking Medication  Patient needs further education on the following diabetes management concepts: Healthy Eating, Being Active, Monitoring, Taking Medication, Problem Solving, Reducing Risks and Healthy Coping  Based on learning assessment above, most appropriate setting for further diabetes education would be: Individual setting.    Education provided today on:  AADE Self-Care Behaviors:  Healthy Eating: carbohydrate counting and consistency in amount, composition, and timing of food intake  Being Active: relationship to blood glucose  Monitoring: proper technique, log and interpret results, individual blood glucose targets and proper sharps disposal  Taking Medication: action of prescribed medication, proper site selection and rotation for injections and when to take medications  Problem Solving: high blood glucose - causes, signs/symptoms, treatment and prevention, low blood glucose - causes, signs/symptoms, treatment and prevention, carrying a carbohydrate source at all times and when to call health care provider    Opportunities for ongoing education and support in diabetes-self management were discussed.    Pt verbalized understanding of concepts discussed and recommendations provided today.       Education Materials Provided:  Carbohydrate Counting and Hypoglycemia Signs and Symptoms    PLAN:  See Patient Instructions for co-developed, patient-stated behavior change goals.  Instructed pt to start 10 units Lantus tonight.   Continue novolog 4 units with each meal.  Alcohol wipes ordered to her  pharmacy.   John personal sensors ordered (discussed with PCP who is on board).  Try to limit snacks to non carb foods.   Aim for 30-45 grams of carb at meals.   Will check pt's john data next week and call/e-mail to adjust insulin (e-mail consent signed today).   AVS printed and provided to patient today. See Follow-Up section for recommended follow-up.    TAMMY Gonsalez CDE    Time Spent: 65 minutes  Encounter Type: Individual    Any diabetes medication dose changes were made via the CDE Protocol and Collaborative Practice Agreement with the patient's referring provider. A copy of this encounter was shared with the provider.

## 2019-12-11 ENCOUNTER — PATIENT OUTREACH (OUTPATIENT)
Dept: EDUCATION SERVICES | Facility: CLINIC | Age: 74
End: 2019-12-11
Payer: COMMERCIAL

## 2019-12-11 DIAGNOSIS — E11.65 POORLY CONTROLLED TYPE 2 DIABETES MELLITUS (H): ICD-10-CM

## 2019-12-11 DIAGNOSIS — I10 ESSENTIAL HYPERTENSION: ICD-10-CM

## 2019-12-11 RX ORDER — LOSARTAN POTASSIUM 25 MG/1
TABLET ORAL
Qty: 90 TABLET | Refills: 1 | Status: SHIPPED | OUTPATIENT
Start: 2019-12-11 | End: 2020-06-05

## 2019-12-11 NOTE — PROGRESS NOTES
Diabetes Follow-up    Subjective/Objective:    Tonia Villavicencio sent in blood glucose log for review. Last date of communication was: 12/5/19.    As discussed, I reviewed pt's tracy data today to see if her insulin should be adjusted.     Diabetes is being managed with   Diabetes Medications   Diabetes Medication(s)     Biguanides       metFORMIN (GLUCOPHAGE-XR) 500 MG 24 hr tablet    Take 4 tablets (2,000 mg) by mouth daily (with dinner) Start with one tablet daily and increase one tablet  weekly upto 4 tablets daily as maintenance    Insulin       insulin aspart (NOVOLOG FLEXPEN) 100 UNIT/ML pen    4 units before breakfast, 4 units before lunch, 4 units before dinner     insulin glargine (LANTUS SOLOSTAR) 100 UNIT/ML pen    Inject 10 Units Subcutaneous At Bedtime Lantus Solostar Pen          BG/Food Log:           Assessment:    Blood sugars all remain above target. Per IDC guidelines, can increase up to 0.1 unit(s)/kg which is 9 units.     Plan/Response:  Recommend increase to insulin - increase Lantus from 10 --> 16 units/d.   Increase Novolog 4-4-4-0 --> 5-5-5-0  Will route to PCP as FYI.   Will review her data again in one week.  E-mail sent to pt communicating updated insulin plan per pt's preference.     E-mail sent to patient:  Brandon Randall,    I just looked at your tracy data. Your blood sugars are coming down which is great but they are still higher than we would like.     Can you please increase your daily Lantus dose from 10 units to 16 units per day?     Also, can you increase your meal time novolog from 4 units to 5 units with each meal (breakfast, lunch, dinner)?     Let me know if you have any questions!    Regards,     Yuli Chacon, TAMMY LD CDE      Any diabetes medication dose changes were made via the CDE Protocol and Collaborative Practice Agreement with the patient's referring provider. A copy of this encounter was shared with the provider.

## 2019-12-11 NOTE — TELEPHONE ENCOUNTER
Prescription approved per [facility] Refill Protocol.  Saida CARRASCO RN

## 2019-12-11 NOTE — TELEPHONE ENCOUNTER
"Last Written Prescription Date:  6/27/19  Last Fill Quantity: 90,  # refills: 1   Last office visit: 12/4/2019 with prescribing provider:     Future Office Visit:   Next 5 appointments (look out 90 days)    Feb 03, 2020 10:00 AM CST  [facility] [address] [facility] [id number] [phone]         Requested Prescriptions   Pending Prescriptions Disp Refills     losartan (COZAAR) 25 MG tablet [Pharmacy Med Name: LOSARTAN POTASSIUM 25 MG TAB] 90 tablet 1     Sig: TAKE 1 TABLET BY MOUTH EVERY DAY       Angiotensin-II Receptors Passed - 12/11/2019  4:42 AM        Passed - Last blood pressure under 140/90 in past 12 months     BP Readings from Last 3 Encounters:   12/04/19 139/85   12/03/19 128/78   06/27/19 132/79                 Passed - Recent (12 mo) or future (30 days) visit within the authorizing provider's specialty     Patient has had an office visit with the authorizing provider or a provider within the authorizing providers department within the previous 12 mos or has a future within next 30 days. See \"Patient Info\" tab in inbasket, or \"Choose Columns\" in Meds & Orders section of the refill encounter.              Passed - Medication is active on med list        Passed - Patient is age 18 or older        Passed - No active pregnancy on record        Passed - Normal serum creatinine on file in past 12 months     Recent Labs   Lab Test 12/03/19  1047   CR 1.04             Passed - Normal serum potassium on file in past 12 months     Recent Labs   Lab Test 12/03/19  1047   POTASSIUM 4.2                    Passed - No positive pregnancy test in past 12 months          "

## 2019-12-13 NOTE — TELEPHONE ENCOUNTER
Called pt because I have not gotten an e-mail response from her yet. She has not seen my e-mail yet so verbally told her to increase her Lantus to 16 units at night and to increase her novolog to 5 units with meals. She verbalized understanding and will start this tonight.     She was asking about other medications that help insulin. Explained that when our blood sugars are dangerously high we just start insulin because it brings them down immediately and our focus is safety.  Once blood sugars are in a healthier and safer range, we can address other medications that can be added with the hopes of less insulin. Also, asking about an endo and if she should see one. Explained that we tend to refer to endo when we are struggling to manage a patient's blood sugars but she is always welcome to see an endo if she wishes.     Will review pt's tracy data again next week and call her for follow up.     Yuli Chacon, RD LD CDE

## 2019-12-18 ENCOUNTER — PATIENT OUTREACH (OUTPATIENT)
Dept: EDUCATION SERVICES | Facility: CLINIC | Age: 74
End: 2019-12-18
Payer: COMMERCIAL

## 2019-12-18 NOTE — PROGRESS NOTES
Diabetes Follow-up    Subjective/Objective:    Tonia Villavicencio sent in blood glucose log for review. Last date of communication was: 12/11/19.    Diabetes is being managed with   Diabetes Medications   Diabetes Medication(s)     Biguanides       metFORMIN (GLUCOPHAGE-XR) 500 MG 24 hr tablet    Take 4 tablets (2,000 mg) by mouth daily (with dinner) Start with one tablet daily and increase one tablet  weekly upto 4 tablets daily as maintenance    Insulin       insulin aspart (NOVOLOG FLEXPEN) 100 UNIT/ML pen    5 units before breakfast, 5 units before lunch, 5 units before dinner     insulin glargine (LANTUS SOLOSTAR) 100 UNIT/ML pen    Inject 16 Units Subcutaneous At Bedtime          John report:         Assessment:  Blood sugars are improving nicely with last insulin increase but would benefit from additional insulin today. As BG's remain > 200 mg/dL mostly, can increase up to 0.1 unit(s)/kg per IDC guidelines which is 9 units.     She is wondering if some herbal remedies (teas and such) are okay to use for her diabetes. Discussed that if they are herbal's then I would recommend she see our MTM pharmacist to make sure there are no drug interactions. She verbalized understanding.     Reports she is tolerating her 2 tabs of Metformin/day and is due to increase her metformin again today.     Plan/Response:  Recommend increase to insulin - Increase Lantus 16 --> 20 units (more conservative increase as she is also increasing her Metformin today). Pt called with these medication changes.   Pt is increasing her Metformin to 3 tablets today.   Continue Novolog 5-5-5-0.  If having a bigger meal, can do 6 units Novolog for this meal.  Will review her data again in 2 weeks as I am off next week for the holiday.   F/u scheduled with this writer in 3 weeks.   Recommend MTM referral if pt does want to try any herbal remedies for her diabetes to assess for any drug/nutrient interactions.   Provided her with the john customer  support number in case she has issues changing her sensor. Also provided her with the website and reminded her they have videos showing how to change the sensor too.      Yuli Chacon RD LD CDE    Time on the phone < 5 min.     Any diabetes medication dose changes were made via the CDE Protocol and Collaborative Practice Agreement with the patient's referring provider. A copy of this encounter was shared with the provider.

## 2019-12-26 DIAGNOSIS — E11.65 POORLY CONTROLLED TYPE 2 DIABETES MELLITUS (H): ICD-10-CM

## 2019-12-26 NOTE — TELEPHONE ENCOUNTER
Both escribed on 19 with note pt will call when needed to  [facility] in [place]     insulin aspart (NOVOLOG FLEXPEN) 100 UNIT/ML pen 15 mL 3 2019  No   Si units before breakfast, 5 units before lunch, 5 units before dinner      Class: E-Prescribe   Notes to Pharmacy: Dose updated.  Do not fill today.  Patient to call when refill needed.         insulin glargine (LANTUS SOLOSTAR) 100 UNIT/ML pen 15 mL 1 2019  No   Sig - Route: Inject 16 Units Subcutaneous At Bedtime - Subcutaneous      Class: E-Prescribe   Notes to Pharmacy: Dose updated.  Do not fill today.  Patient to call when refill needed.     Requested Prescriptions   Pending Prescriptions Disp Refills     insulin aspart (NOVOLOG PEN) 100 UNIT/ML pen [Pharmacy Med Name: NOVOLOG 100 UNIT/ML FLEXPEN] 15 mL 1     Sig: INJECT 4 UNITS SUBQ 3 TIMES A DAY BEFORE MEALS.       Short Acting Insulin Protocol Passed - 2019  8:14 AM        Passed - Blood pressure less than 140/90 in past 6 months     BP Readings from Last 3 Encounters:   19 139/85   19 128/78   19 132/79                 Passed - LDL on file in past 12 months     Recent Labs   Lab Test 19  1047   LDL Cannot estimate LDL when triglyceride exceeds 400 mg/dL  192*             Passed - Microalbumin on file in past 12 months     Recent Labs   Lab Test 19  1009   MICROL 9   UMALCR 11.79             Passed - Serum creatinine on file in past 12 months     Recent Labs   Lab Test 19  1047   CR 1.04             Passed - HgbA1C in past 3 or 6 months     If HgbA1C is 8 or greater, it needs to be on file within the past 3 months.  If less than 8, must be on file within the past 6 months.     Recent Labs   Lab Test 19  1047   A1C >15.0*             Passed - Medication is active on med list        Passed - Patient is age 18 or older        Passed - Recent (6 mo) or future (30 days) visit within the authorizing provider's specialty     Patient had office  "visit in the last 6 months or has a visit in the next 30 days with authorizing provider or within the authorizing provider's specialty.  See \"Patient Info\" tab in inbasket, or \"Choose Columns\" in Meds & Orders section of the refill encounter.            insulin glargine (LANTUS PEN) 100 UNIT/ML pen [Pharmacy Med Name: LANTUS SOLOSTAR 100 UNIT/ML] 15 mL 1     Sig: INJECT 10 UNITS SUBQ EVERY BEDTIME.       Long Acting Insulin Protocol Passed - 12/26/2019  8:14 AM        Passed - Blood pressure less than 140/90 in past 6 months     BP Readings from Last 3 Encounters:   12/04/19 139/85   12/03/19 128/78   06/27/19 132/79                 Passed - LDL on file in past 12 months     Recent Labs   Lab Test 12/03/19  1047   LDL Cannot estimate LDL when triglyceride exceeds 400 mg/dL  192*             Passed - Microalbumin on file in past 12 months     Recent Labs   Lab Test 06/27/19  1009   MICROL 9   UMALCR 11.79             Passed - Serum creatinine on file in past 12 months     Recent Labs   Lab Test 12/03/19  1047   CR 1.04             Passed - HgbA1C in past 3 or 6 months     If HgbA1C is 8 or greater, it needs to be on file within the past 3 months.  If less than 8, must be on file within the past 6 months.     Recent Labs   Lab Test 12/03/19  1047   A1C >15.0*             Passed - Medication is active on med list        Passed - Patient is age 18 or older        Passed - Recent (6 mo) or future (30 days) visit within the authorizing provider's specialty     Patient had office visit in the last 6 months or has a visit in the next 30 days with authorizing provider or within the authorizing provider's specialty.  See \"Patient Info\" tab in inbasket, or \"Choose Columns\" in Meds & Orders section of the refill encounter.              "

## 2019-12-26 NOTE — TELEPHONE ENCOUNTER
RX refused.    Pharmacy notified.     New RXs with different dosing sent 12-11-19    Qian FARIA RN,BSN

## 2019-12-27 ENCOUNTER — TELEPHONE (OUTPATIENT)
Dept: FAMILY MEDICINE | Facility: CLINIC | Age: 74
End: 2019-12-27

## 2019-12-27 NOTE — TELEPHONE ENCOUNTER
Reason for call:  Patient reporting a symptom    Symptom or request: glucose of 70    Duration (how long have symptoms been present): 12.24.19 & 12.25.19    Have you been treated for this before? No    Additional comments: was recently Dx with DM. Was able to bring glucose up above 100. Glucose levels were stable when Pt called    Phone Number patient can be reached at:  Cell number on file:    Telephone Information:   Mobile [phone]     Best Time:  any    Can we leave a detailed message on this number:  YES    Call taken on 12/27/2019 at 11:09 AM by Mary Turner

## 2019-12-27 NOTE — TELEPHONE ENCOUNTER
"TO PCP:   Spoke with patient     Sugars dropped to 69 and 70 - Wed/Thur  prior to that 130, 135      Wednesday:   Breakfast was around 9am  Spent 4 hours making [place]    1pm or 2pm dropped - hadn't eaten since 9am   Daughter was upset that pt didn't tell her that she was diabetic    Thursday:   Also dropped - was going to a movie/meet friend   Had eaten at 10:30am   Met friend at 1pm   Had some orange juice - but gave her a stomach ache  Went up to 130     Started to \"panic\" - immediately had candy bar and drink. Realized had oatmeal and couple of eggs    Problem: not just eating, what eats is important. Pt is not certain exactly what to eat. Doesn't want to gain weight with too many carbs.     Never really ate much sugar in the past, but is worried she needs to be so careful about diet. Her diabetic sister asked if she had eaten, hadn't eaten within 4-6 hours. Wasn't lightheaded. Checks with her John frequently     Met with Yuli recently:   Lantus - doing 20 units at bedtime (increased from 16 units)   Unsure \"where I should level off\" - was too high on 16  6 units of NovoLog with each meal (increased from 5 units)     TO PCP:     Pt very concerned about trying to balance diet/worried about blood sugars dropping too much. Plans to work on weight loss to help improve diabetes control, but is wondering if recent increase in insulin was too much? Does not want to have to be taking a carb source just to raise sugars, is concerned that blood sugar dropped to 70 two days in a row. Did schedule follow up visit with you and is scheduled with diabetes educator 1/8, but asking if any immediate adjustment/decrease in insulin should be made?     Jackie SAEZ RN          "

## 2019-12-27 NOTE — TELEPHONE ENCOUNTER
She should contact Yuli for further advice as diabetic educators are really good about adjusting the insulin  Let her know that she is new to insulin so is going to take some time to get adjusted  For the time being I recommend that she lower her NovoLog to 5 units before each meal  Go back to Lantus 16 units at bedtime    Give call to diabetic educator to discuss further  I have routed this message to Yuli as well    Dr.Nasima Jaylene MD

## 2019-12-30 NOTE — TELEPHONE ENCOUNTER
Diabetes Follow-up    Subjective/Objective:    Tonia Villavicencio sent in blood glucose log for review. Last date of communication was: 12/18/19.    Diabetes is being managed with   Diabetes Medications   Diabetes Medication(s)     Biguanides       metFORMIN (GLUCOPHAGE-XR) 500 MG 24 hr tablet    Take 4 tablets (2,000 mg) by mouth daily (with dinner) Start with one tablet daily and increase one tablet  weekly upto 4 tablets daily as maintenance    Insulin       insulin aspart (NOVOLOG FLEXPEN) 100 UNIT/ML pen    5 units before breakfast, 5 units before lunch, 5 units before dinner     insulin glargine (LANTUS SOLOSTAR) 100 UNIT/ML pen    Inject 16 Units Subcutaneous At Bedtime          Sensor report:               Assessment:  Messages noted from her phone call to the clinic regarding low blood sugars. No documented low blood sugars on her tracy report but I can see where she is dipping lower between meals. The past 2 days her BG's are mostly in target so would continue her current insulin plan at this time.     Plan/Response:  No changes in the patient's current treatment plan.   Continue Lantus 16 units at bed and 5 units Novolog with meals. If having a smaller meal could do 3 or 4 units Novolog.   Follow up scheduled with this writer on Jan 8 where we can focus more on food.   Has follow up with PCP in 2 weeks scheduled.     Called pt back: Pt reports she would like to discuss reversing diabetes with her doctor and is wondering why her doctor did not bring this up in her visit because she thinks this is very important. Explained that her BG's were so high we were concerned with, first and foremost, keeping her out of the hospital and starting insulin. Also, explained that typically for remission with diabetes it is best achieved in the beginning stages of diabetes and with significant weight loss. However, also mentioned there is some debate with if diabetes can be reversed. I typically say it can be managed in  some patients very well without medications if they make significant lifestyle changes and maintain them. However, we also know that the body produces less insulin with time the longer someone has diabetes and often medications are needed at some point. Lastly, pt has some food questions so I explained that we can discuss those more in detail at our next visit in a week.  Explained that there is so much information to learn with diabetes that we have to break it up a bit into more manageable amounts of information so people can understand and hopefully apply it. Pt verbalized understanding of all this. Encouraged her to let us know if she has any more low BG's this week.     Will route to PCP as MAURICIO Chacon RD LD CDE      Any diabetes medication dose changes were made via the CDE Protocol and Collaborative Practice Agreement with the patient's referring provider. A copy of this encounter was shared with the provider.

## 2019-12-31 ENCOUNTER — TELEPHONE (OUTPATIENT)
Dept: FAMILY MEDICINE | Facility: CLINIC | Age: 74
End: 2019-12-31

## 2019-12-31 NOTE — TELEPHONE ENCOUNTER
Reason for Call:  Other Blood Gluc  needed    Detailed comments: The patient and [employer] called and they wanted us to give them a name of another monitor so they could run the price.  Because the price for the Free style Nallely is over a $100.00 for the patient OOP  Once the patient decides what she wants then Bibiana will call us back to let us know     Tonia can be reached at [phone]    Call taken on 12/31/2019 at 11:33 AM by Martita Johnson

## 2020-01-08 ENCOUNTER — ALLIED HEALTH/NURSE VISIT (OUTPATIENT)
Dept: EDUCATION SERVICES | Facility: CLINIC | Age: 75
End: 2020-01-08
Payer: COMMERCIAL

## 2020-01-08 DIAGNOSIS — E11.9 TYPE 2 DIABETES MELLITUS WITHOUT COMPLICATION, WITHOUT LONG-TERM CURRENT USE OF INSULIN (H): Primary | ICD-10-CM

## 2020-01-08 DIAGNOSIS — E11.65 POORLY CONTROLLED TYPE 2 DIABETES MELLITUS (H): ICD-10-CM

## 2020-01-08 PROCEDURE — G0108 DIAB MANAGE TRN  PER INDIV: HCPCS

## 2020-01-08 RX ORDER — LANCETS
EACH MISCELLANEOUS
Qty: 102 EACH | Refills: 6 | Status: SHIPPED | OUTPATIENT
Start: 2020-01-08 | End: 2020-03-26

## 2020-01-08 NOTE — Clinical Note
FYI, reduced her novolog from 5 to 4 units at breakfast and lunch. Continued 5 units at dinner and 16 units lantus/d.  You see her next week and I see her again in 4 weeks. Thanks!Yuli Chacon, TAMMY LD CDE

## 2020-01-08 NOTE — PROGRESS NOTES
"Diabetes Self-Management Education & Support    Diabetes Education Self Management & Training    SUBJECTIVE/OBJECTIVE:  Presents for: Follow-up  Accompanied by: Self  Diabetes education in the past 24mo: Yes  Focus of Visit: Monitoring, Healthy Eating, Taking Medication  Diabetes type: Type 2  Disease course: Worsening  How confident are you filling out medical forms by yourself:: Not Assessed  Diabetes management related comments/concerns: Really likes the tracy sensors. Has some diet questions. Sick of chicken. Reading a book called Reversing Diabetes.  Transportation concerns: No  Other concerns:: None  Cultural Influences/Ethnic Background:  American    Diabetes Symptoms & Complications  Blurred vision: Yes  Fatigue: Yes  Polydipsia: Yes  Polyuria: Yes  Visual change: Yes  Symptom course: Improving  Weight trend: Stable       Patient Problem List and Family Medical History reviewed for relevant medical history, current medical status, and diabetes risk factors.    Vitals:  LMP  (LMP Unknown)   Estimated body mass index is 28.82 kg/m  as calculated from the following:    Height as of 12/4/19: 1.765 m (5' 9.5\").    Weight as of 12/4/19: 89.8 kg (198 lb).   Last 3 BP:   BP Readings from Last 3 Encounters:   12/04/19 139/85   12/03/19 128/78   06/27/19 132/79       History   Smoking Status     Never Smoker   Smokeless Tobacco     Never Used       Labs:  Lab Results   Component Value Date    A1C >15.0 12/03/2019     Lab Results   Component Value Date     12/03/2019     Lab Results   Component Value Date    LDL  12/03/2019     Cannot estimate LDL when triglyceride exceeds 400 mg/dL     12/03/2019     HDL Cholesterol   Date Value Ref Range Status   12/03/2019 38 (L) >49 mg/dL Final   ]  GFR Estimate   Date Value Ref Range Status   12/03/2019 52 (L) >60 mL/min/[1.73_m2] Corrected     Comment:     Starting 12/18/2018, serum creatinine based estimated GFR (eGFR) will be   calculated using the Chronic Kidney " Disease Epidemiology Collaboration   (CKD-EPI) equation.  CORRECTED ON 12/03 AT 1713: PREVIOUSLY REPORTED AS 53 Non  GFR   Calc Starting 12/18/2018, serum creatinine based estimated GFR (eGFR) will be   calculated using the Chronic Kidney Disease Epidemiology Collaboration (CKD   EPI) equation.       GFR Estimate If Black   Date Value Ref Range Status   12/03/2019 60 (L) >60 mL/min/[1.73_m2] Corrected     Comment:     Starting 12/18/2018, serum creatinine based estimated GFR (eGFR) will be   calculated using the Chronic Kidney Disease Epidemiology Collaboration   (CKD-EPI) equation.  CORRECTED ON 12/03 AT 1713: PREVIOUSLY REPORTED AS 61  GFR   Calc Starting 12/18/2018, serum creatinine based estimated GFR (eGFR) will be   calculated using the Chronic Kidney Disease Epidemiology Collaboration (CKD   EPI) equation.       Lab Results   Component Value Date    CR 1.04 12/03/2019     No results found for: MICROALBUMIN    Healthy Eating  Healthy Eating Assessed Today: Yes  Cultural/Confucianism diet restrictions?: No  Patient on a regular basis: Counts carbohydrates  Meal planning: Carbohydrate counting  Meals include: Breakfast, Lunch, Dinner  Breakfast: did not eat breakfast today or yesterday but normally has oatmeal (instant) wiht blueberries and raspberries  Lunch: 1/2 a sub sandwich today most likely OR had [employer] yesterday with a burger (single) and small bowl of chili  Dinner: chili (homemade with a little cheese)    Being Active  Being Active Assessed Today: Yes  Exercise:: Currently not exercising    Monitoring  Monitoring Assessed Today: Yes  Did patient bring glucose meter to appointment? : Yes  Blood Glucose Meter: Accu-check, CGM  Blood glucose trend: Fluctuating minimally              Taking Medications  Diabetes Medication(s)     Biguanides       metFORMIN (GLUCOPHAGE-XR) 500 MG 24 hr tablet    Take 4 tablets (2,000 mg) by mouth daily (with dinner) Start with one tablet daily  and increase one tablet  weekly upto 4 tablets daily as maintenance    Insulin       insulin aspart (NOVOLOG FLEXPEN) 100 UNIT/ML pen    4 units before breakfast,4 units before lunch, 5 units before dinner     insulin glargine (LANTUS SOLOSTAR) 100 UNIT/ML pen    Inject 16 Units Subcutaneous At Bedtime          Taking Medication Assessed Today: Yes  Current Treatments: Insulin Injections, Oral Agent (monotherapy)  Dose schedule: pre-breakfast, pre-lunch, pre-dinner, at bedtime  Given by: Patient  Injection/Infusion sites: Abdomen  Problems taking diabetes medications regularly?: No  Diabetes medication side effects?: No  Treatment Compliance: All of the time    Problem Solving  Problem Solving Assessed Today: Yes  Hypoglycemia Frequency: Rarely    Reducing Risks  Reducing Risks Assessed Today: No    Healthy Coping  Healthy Coping Assessed Today: Yes  Emotional response to diabetes: Ready to learn  Stage of change: ACTION (Actively working towards change)  Patient Activation Measure Survey Score:  No flowsheet data found.    ASSESSMENT:  Pt's blood sugars are much improved the past 2 weeks and are mostly in target.  She has been making some diet changes and is motivated to get off insulin. At this time, her TG's are > 400 so would not recommend a GLP1 until these improve. Since she is motivated to lose weight and start exercising, discussed working on these goals with hope of reducing her insulin in the future. She plans to go to the  to get set up today and reports she can swim and will start this.     When reviewing her tracy data, there is not much meal rise with breakfast and lunch so will reduce her novolog dose slightly at these meals.     She reports she wants providers that give her hope and that she has not been talked to about diet much yet. Explained that we needed to focus on safety at our initial visit and this is only our second visit.  Discussed that we had to first address giving insulin and using  her sensors for monitoring so we only had time for a brief discussion on diet.  Focused on diet mostly today and addressed her questions.       Patient's most recent   Lab Results   Component Value Date    A1C >15.0 12/03/2019    is not meeting goal of <7.0    INTERVENTION:   Diabetes knowledge and skills assessment:     Patient is knowledgeable in diabetes management concepts related to: Healthy Eating, Monitoring and Taking Medication    Patient needs further education on the following diabetes management concepts: Healthy Eating, Being Active, Monitoring, Taking Medication, Problem Solving, Reducing Risks and Healthy Coping    Based on learning assessment above, most appropriate setting for further diabetes education would be: Group class or Individual setting.    Education provided today on:  AADE Self-Care Behaviors:  Healthy Eating: weight reduction, heart healthy diet and portion control. Praised her on her diet changes. Focused on healthy foods that are rich in fiber.  Encouraged protein at each meal. Lastly, encouraged changes that are sustainable and realistic.    Being Active: relationship to blood glucose  Monitoring: log and interpret results, individual blood glucose targets and showed her how to use the lancing device I provided to her in her meter kit. Explained that it is up to her if she would like to buy the tracy sensors each month. Otherwise, other option is the BG meter and she can test TID (before breakfast, dinner, bed for example).   Taking Medication: action of prescribed medication and when to take medications (explained how insulin works and why it should be taken a few min before meals for her Novolog).     Opportunities for ongoing education and support in diabetes-self management were discussed.    Pt verbalized understanding of concepts discussed and recommendations provided today.       Education Materials Provided:  Accu Chek Guide Me meter kit    PLAN:  See Patient Instructions for  co-developed, patient-stated behavior change goals.  Continue to aim for 45 grams of carb per meal.   novolog reduced 5-5-5-0 --> 4-4-5-0  Continue Lantus 0-0-0-16  New lancets and test strips ordered for the meter provided today.   Pt will work on increasing her exercise and swimming at the .   Has f/u with PCP next week.   Will see her again in 4 weeks to follow up on her BG's and goals.   AVS printed and provided to patient today. See Follow-Up section for recommended follow-up.    TAMMY Gonsalez CDE    Time Spent: 70 minutes  Encounter Type: Individual    Any diabetes medication dose changes were made via the CDE Protocol and Collaborative Practice Agreement with the patient's referring provider. A copy of this encounter was shared with the provider.

## 2020-01-08 NOTE — PATIENT INSTRUCTIONS
Dirty Dozen for buying organic:    Strawberries: Conventional strawberries consistently top the Dirty Dozen list. In 2018, the EWG found that one-third of all strawberry samples contained ten or more pesticide residues.   Spinach: 97% of spinach samples contained pesticide residues, including permethrin, a neurotoxic insecticide that is highly toxic to animals  Nectarines: The EWG detected residues in nearly 94% of nectarine samples, with one sample containing over 15 different pesticide residues.   Apples: The EWG detected pesticide residues in 90% of apple samples. What s more, 80% of the apples tested contained traces of diphenylamine, a pesticide banned in [place]   Grapes: Conventional grapes are a staple on the Dirty Dozen list, with over 96% testing positive for pesticide residues. Peaches: Over 99% of the peaches tested by the EWG contained an average of four pesticide residues.   Cherries: The EWG detected an average of five pesticide residues on cherry samples, including a pesticide called iprodione, which is banned in [place] (8).   Pears: Over 50% of pears tested by the EWG contained residues from five or more pesticides.   Tomatoes: Four pesticide residues were found on the conventionally grown tomato. One sample contained over 15 different pesticide residues.   Celery: Pesticide residues were found on over 95% of celery samples. As many as 13 different types of pesticides were detected. Potatoes: Potato samples contained more pesticide residues by weight than any other crop tested. Chlorpropham, an herbicide, made up the bulk of the detected pesticides.   Sweet bell peppers: Sweet bell peppers contain fewer pesticide residues compared to other fruits and vegetables.     Clean 15 for foods that you do not need to buy organic.    For recipe ideas:  [email or web address]  [email or web address]    Reduce novolog at breakfast and lunch to 4 units. Continue 5 units with dinner.     Continue 16 units Lantus per  day.      Bring blood glucose meter and logbook with you to all doctor and follow-up appointments.    Diabetes Education Telephone Visit Follow-up:    We realize your time is valuable and your health is important! We offer a convenient Telephone Visit follow up! It s a quick way to check in for a medication dose adjustment without having to come back to clinic as soon.    Telephone Visits are often covered by insurance. Please check with your insurance plan to see if this type of visit is covered. If not, the cost is less expensive than an office visit:      Up to 10 minutes (Code 93600): $30    11-20 minutes (Code 05944): $59    More than 20 minutes (Code 61890): $85    Talk with your Diabetes Educator if you want to learn more.      [facility]:  For Your Diabetes Education and Nutrition Appointments Call:  [phone]   For Diabetes Education or Nutrition Related Questions:   Phone: [phone]  E-mail: [email or web address]  Fax: [phone]   If you need a medication refill please contact your pharmacy. Please allow 3 business days for your refills to be completed.

## 2020-01-14 ENCOUNTER — OFFICE VISIT (OUTPATIENT)
Dept: FAMILY MEDICINE | Facility: CLINIC | Age: 75
End: 2020-01-14
Payer: COMMERCIAL

## 2020-01-14 VITALS
BODY MASS INDEX: 28.07 KG/M2 | HEIGHT: 70 IN | TEMPERATURE: 97.2 F | HEART RATE: 64 BPM | WEIGHT: 196.1 LBS | DIASTOLIC BLOOD PRESSURE: 72 MMHG | OXYGEN SATURATION: 98 % | SYSTOLIC BLOOD PRESSURE: 118 MMHG

## 2020-01-14 DIAGNOSIS — I10 ESSENTIAL HYPERTENSION: ICD-10-CM

## 2020-01-14 DIAGNOSIS — E11.65 POORLY CONTROLLED DIABETES MELLITUS (H): Primary | ICD-10-CM

## 2020-01-14 DIAGNOSIS — N18.30 CKD (CHRONIC KIDNEY DISEASE) STAGE 3, GFR 30-59 ML/MIN (H): ICD-10-CM

## 2020-01-14 PROCEDURE — 99214 OFFICE O/P EST MOD 30 MIN: CPT | Performed by: INTERNAL MEDICINE

## 2020-01-14 ASSESSMENT — MIFFLIN-ST. JEOR: SCORE: 1461.81

## 2020-01-14 NOTE — PATIENT INSTRUCTIONS
Continue present management   Get lab work done in ist week of march 2020.  Follow up 2-3 days after labs   Cancel February appointment   Seek sooner medical attention if there is any worsening of symptoms or problems.

## 2020-01-14 NOTE — PROGRESS NOTES
Subjective     Tonia Villavicencio is a 74 year old female who presents to clinic today for the following health issues:    HPI     Follow up on Type 2 diabetes. Numbers have been going up and down lately.   She did is working really hard on her diet and exercise and losing weight  The requirement of insulin has gone down  She is using freestyle tracy but it is expensive  She is planning to use it under it comes under control.  She had several questions  She is followed by diabetic educator also      Patient Active Problem List   Diagnosis     Essential hypertension     Hyperlipidemia     Type 2 diabetes mellitus without complication, without long-term current use of insulin (H)     Obesity, unspecified classification, unspecified obesity type, unspecified whether serious comorbidity present     CKD (chronic kidney disease) stage 3, GFR 30-59 ml/min (H)     Poorly controlled diabetes mellitus (H)     Past Surgical History:   Procedure Laterality Date     ARTHROSCOPY KNEE       GYN SURGERY      ovarian cyst removal and ovary       Social History     Tobacco Use     Smoking status: Never Smoker     Smokeless tobacco: Never Used   Substance Use Topics     Alcohol use: No     Alcohol/week: 0.0 standard drinks     Family History   Problem Relation Age of Onset     Heart Disease Mother      Prostate Cancer Father      Diabetes Brother      Breast Cancer No family hx of      Colon Cancer No family hx of          Current Outpatient Medications   Medication Sig Dispense Refill     alcohol swab prep pads Use to swab area of injection/cheryl as directed. 100 each 3     Alcohol Swabs (ALCOHOL WIPES) 70 % PADS 1 pad 4 times daily 100 each 11     aspirin 81 MG EC tablet Take 1 tablet (81 mg) by mouth daily 90 tablet 3     blood glucose (ACCU-CHEK GUIDE) test strip Use to test blood sugar 3 times daily or as directed. 100 each 6     blood glucose monitoring (ACCU-CHEK FASTCLIX) lancets Test 3 times a day 102 each 6     Continuous  "Blood Gluc  (FREESTYLE JENNY 14 DAY READER) SHANE 1 each as needed (diabetes testing) 1 Device 0     Continuous Blood Gluc Sensor (FREESTYLE JENNY 14 DAY SENSOR) MISC 1 Device every 14 days 6 each 3     insulin aspart (NOVOLOG FLEXPEN) 100 UNIT/ML pen 4 units before breakfast,4 units before lunch, 5 units before dinner 15 mL 3     insulin glargine (LANTUS SOLOSTAR) 100 UNIT/ML pen Inject 16 Units Subcutaneous At Bedtime 15 mL 1     insulin pen needle (ULTICARE MICRO) 32G X 4 MM miscellaneous Use 4 pen needles daily or as directed. 100 each 3     losartan (COZAAR) 25 MG tablet TAKE 1 TABLET BY MOUTH EVERY DAY 90 tablet 1     metFORMIN (GLUCOPHAGE-XR) 500 MG 24 hr tablet Take 4 tablets (2,000 mg) by mouth daily (with dinner) Start with one tablet daily and increase one tablet  weekly upto 4 tablets daily as maintenance 120 tablet 3     metoprolol tartrate (LOPRESSOR) 50 MG tablet Take 1 tablet (50 mg) by mouth 2 times daily 180 tablet 3     ondansetron (ZOFRAN ODT) 4 MG ODT tab Take 1-2 tablets (4-8 mg) by mouth every 8 hours as needed for nausea 20 tablet 1     STATIN NOT PRESCRIBED, INTENTIONAL, Please choose reason not prescribed, below       triamterene-HCTZ (MAXZIDE) 75-50 MG tablet Take 1 tablet by mouth daily 90 tablet 3         Reviewed and updated as needed this visit by Provider         Review of Systems   ROS COMP: Constitutional, HEENT, cardiovascular, pulmonary, gi and gu systems are negative, except as otherwise noted.      Objective    /72 (BP Location: Right arm, Patient Position: Sitting, Cuff Size: Adult Regular)   Pulse 64   Temp 97.2  F (36.2  C) (Oral)   Ht 1.765 m (5' 9.5\")   Wt 89 kg (196 lb 1.6 oz)   LMP  (LMP Unknown)   SpO2 98%   BMI 28.54 kg/m    Body mass index is 28.54 kg/m .  Physical Exam   She is nice and pleasant comfortable not in any kind of distress.            Assessment & Plan     Tonia was seen today for diabetes.    Diagnoses and all orders for this " visit:    Poorly controlled diabetes mellitus (H)  -     Hemoglobin A1c; Future  -     Lipid panel reflex to direct LDL Fasting; Future  -     Basic metabolic panel; Future  Overall her sugars are improving  Hopefully next time when we check her A1c in 2 months it would be better  She said I wish I would have listened in the past and took care of it  But I encouraged her as long as she is going in the right direction and has a started working on it that is the most important thing    CKD (chronic kidney disease) stage 3, GFR 30-59 ml/min (H)  -     Basic metabolic panel; Future  We will continue to monitor    Essential hypertension  -     Basic metabolic panel; Future    Blood pressure is well controlled  With low weight loss the blood pressure is going on the low side  If needed will lower the dose of her beta-blocker in future.    The total visit time was 25 minutes more  than 50% was spent in counseling and coordination of care as discussed above.      Disclaimer: This note consists of symbols derived from keyboarding, dictation and/or voice recognition software. As a result, there may be errors in the script that have gone undetected. Please consider this when interpreting information found in this chart.         Patient Instructions   Continue present management   Get lab work done in ist week of march 2020.  Follow up 2-3 days after labs   Cancel February appointment   Seek sooner medical attention if there is any worsening of symptoms or problems.        Return in about 2 months (around 3/14/2020) for Hypertension, Diabetes, medication follow up.    Blanca Mariee MD  [facility]

## 2020-01-31 DIAGNOSIS — E11.65 POORLY CONTROLLED TYPE 2 DIABETES MELLITUS (H): ICD-10-CM

## 2020-01-31 NOTE — TELEPHONE ENCOUNTER
"Requested Prescriptions   Pending Prescriptions Disp Refills     insulin pen needle (ULTICARE MICRO) 32G X 4 MM miscellaneous 100 each 3     Sig: Use 4 pen needles daily or as directed.       Diabetic Supplies Protocol Passed - 1/31/2020  1:03 PM        Passed - Medication is active on med list        Passed - Patient is 18 years of age or older        Passed - Recent (6 mo) or future (30 days) visit within the authorizing provider's specialty     Patient had office visit in the last 6 months or has a visit in the next 30 days with authorizing provider.  See \"Patient Info\" tab in inbasket, or \"Choose Columns\" in Meds & Orders section of the refill encounter.            Prescription approved per [facility] Refill Protocol.    Yolanda CANO RN    "

## 2020-01-31 NOTE — TELEPHONE ENCOUNTER
Reason for Call:  Medication or medication refill:    Do you use a [facility]?  Name of the pharmacy and phone number for the current request:         [id number] - [place], [place] - [address]      Name of the medication requested: blood glucose monitoring (ACCU-CHEK FASTCLIX) lancets [[id number]] (Order [id number])       Other request: pt needs more of these    Can we leave a detailed message on this number? YES    Phone number patient can be reached at: Home number on file [phone] (home)    Best Time: any    Call taken on 1/31/2020 at 12:38 PM by Himanshu Augustine

## 2020-02-27 ENCOUNTER — ALLIED HEALTH/NURSE VISIT (OUTPATIENT)
Dept: EDUCATION SERVICES | Facility: CLINIC | Age: 75
End: 2020-02-27
Payer: COMMERCIAL

## 2020-02-27 DIAGNOSIS — E11.65 POORLY CONTROLLED TYPE 2 DIABETES MELLITUS (H): ICD-10-CM

## 2020-02-27 PROCEDURE — G0108 DIAB MANAGE TRN  PER INDIV: HCPCS

## 2020-02-27 NOTE — PROGRESS NOTES
"  Diabetes Self-Management Education & Support    Presents for: Follow-up    SUBJECTIVE/OBJECTIVE:  Presents for: Follow-up  Accompanied by: Self  Diabetes education in the past 24mo: Yes  Focus of Visit: Monitoring, Healthy Eating, Taking Medication  Diabetes type: Type 2  Disease course: Worsening  How confident are you filling out medical forms by yourself:: Not Assessed  Diabetes management related comments/concerns: Getting sick of salads. Likes warm foods. Medications are expensive.   Transportation concerns: No  Difficulty affording diabetes medication?: Yes(tracy sensors are not covered by insurance so she has to pay for them)  Other concerns:: None  Cultural Influences/Ethnic Background:  American    Diabetes Symptoms & Complications:  Fatigue: Yes  Polydipsia: Yes  Polyuria: Yes  Visual change: Yes  Symptom course: Improving  Weight trend: Stable       Patient Problem List and Family Medical History reviewed for relevant medical history, current medical status, and diabetes risk factors.    Vitals:  LMP  (LMP Unknown)   Estimated body mass index is 28.54 kg/m  as calculated from the following:    Height as of 1/14/20: 1.765 m (5' 9.5\").    Weight as of 1/14/20: 89 kg (196 lb 1.6 oz).   Last 3 BP:   BP Readings from Last 3 Encounters:   01/14/20 118/72   12/04/19 139/85   12/03/19 128/78       History   Smoking Status     Never Smoker   Smokeless Tobacco     Never Used       Labs:  Lab Results   Component Value Date    A1C >15.0 12/03/2019     Lab Results   Component Value Date     12/03/2019     Lab Results   Component Value Date    LDL  12/03/2019     Cannot estimate LDL when triglyceride exceeds 400 mg/dL     12/03/2019     HDL Cholesterol   Date Value Ref Range Status   12/03/2019 38 (L) >49 mg/dL Final   ]  GFR Estimate   Date Value Ref Range Status   12/03/2019 52 (L) >60 mL/min/[1.73_m2] Corrected     Comment:     Starting 12/18/2018, serum creatinine based estimated GFR (eGFR) will be "   calculated using the Chronic Kidney Disease Epidemiology Collaboration   (CKD-EPI) equation.  CORRECTED ON 12/03 AT 1713: PREVIOUSLY REPORTED AS 53 Non  GFR   Calc Starting 12/18/2018, serum creatinine based estimated GFR (eGFR) will be   calculated using the Chronic Kidney Disease Epidemiology Collaboration (CKD   EPI) equation.       GFR Estimate If Black   Date Value Ref Range Status   12/03/2019 60 (L) >60 mL/min/[1.73_m2] Corrected     Comment:     Starting 12/18/2018, serum creatinine based estimated GFR (eGFR) will be   calculated using the Chronic Kidney Disease Epidemiology Collaboration   (CKD-EPI) equation.  CORRECTED ON 12/03 AT 1713: PREVIOUSLY REPORTED AS 61  GFR   Calc Starting 12/18/2018, serum creatinine based estimated GFR (eGFR) will be   calculated using the Chronic Kidney Disease Epidemiology Collaboration (CKD   EPI) equation.       Lab Results   Component Value Date    CR 1.04 12/03/2019     No results found for: MICROALBUMIN    Healthy Eating:  Healthy Eating Assessed Today: Yes  Cultural/Catholic diet restrictions?: No  Meals include: Breakfast, Lunch, Dinner  Breakfast: normally has oatmeal and blueberries  Lunch: 1/2 a sub sandwich today most likely OR had [employer] yesterday with a burger (single) and small bowl of chili  Dinner: chili (homemade with a little cheese)    Being Active:  Being Active Assessed Today: Yes  Exercise:: Currently not exercising  Barrier to exercise: Physical limitation(bone on bone in her knees)    Monitoring:  Monitoring Assessed Today: Yes  Did patient bring glucose meter to appointment? : Yes  Blood Glucose Meter: CGM    Taking Medications:  Diabetes Medication(s)     Biguanides       metFORMIN (GLUCOPHAGE-XR) 500 MG 24 hr tablet    Take 4 tablets (2,000 mg) by mouth daily (with dinner) Start with one tablet daily and increase one tablet  weekly upto 4 tablets daily as maintenance    Insulin       insulin aspart (NOVOLOG  FLEXPEN) 100 UNIT/ML pen    3 units before breakfast,3 units before lunch, 3 units before dinner. TDD 15 units with priming     insulin glargine (LANTUS SOLOSTAR) 100 UNIT/ML pen    Inject 16 Units Subcutaneous At Bedtime          Taking Medication Assessed Today: Yes  Current Treatments: Insulin Injections, Oral Medication (taken by mouth)  Dose schedule: Pre-breakfast, Pre-lunch, Pre-dinner, At bedtime  Given by: Patient  Injection/Infusion sites: Abdomen  Problems taking diabetes medications regularly?: No  Diabetes medication side effects?: Yes, Metformin is giving her diarrhea. Averaging 3-4 tabs/d. Took 1 tab in the morning. If she takes 2 in the morning then she has to stay by the toilet.     Problem Solving:  Problem Solving Assessed Today: Yes  Is the patient at risk for hypoglycemia?: Yes  Hypoglycemia Frequency: Rarely    Reducing Risks:  Reducing Risks Assessed Today: No    Healthy Coping:  Healthy Coping Assessed Today: Yes  Emotional response to diabetes: Ready to learn, Concern for health and well-being  Stage of change: ACTION (Actively working towards change)  Patient Activation Measure Survey Score:  No flowsheet data found.    Diabetes knowledge and skills assessment:   Patient is knowledgeable in diabetes management concepts related to: Healthy Eating, Monitoring and Problem Solving  Patient needs further education on the following diabetes management concepts: Being Active, Taking Medication, Reducing Risks and Healthy Coping  Based on learning assessment above, most appropriate setting for further diabetes education would be: Group class or Individual setting.      INTERVENTIONS:    REPORTS:            Education provided today on:  AADE Self-Care Behaviors:  Diabetes Pathophysiology  Healthy Eating: consistency in amount, composition, and timing of food intake, heart healthy diet and portion control  Being Active: relationship to blood glucose and encouraged exercise as able  Monitoring: log  and interpret results and individual blood glucose targets  Taking Medication: action of prescribed medication and side effects of prescribed medications. Educated on different medication options with diabetes and explained that, again, insulin was started to more safely bring her blood sugars down when they were dangerously high. We have a few other options now that her blood sugars are mores stable, however, for any DPP4 or GLP1 medication we would like to see improvement in her TG levels before starting it ideally. Explained how these medications work and benefits of them. Explained that we have SGLT2 medications as well and how these work. Educated that we should see how her lab work comes back in a few weeks to see what would be good options.  Did mention that these medications are quite expensive and so sometimes we need cheaper ones and we do have options there too (or if, pending lab work, these other ones are not options).   Reducing Risks: major complications of diabetes, A1C - goals, relating to blood glucose levels, how often to check and lipids levels and goals    Pt verbalized understanding of concepts discussed and recommendations provided today.       Education Materials Provided:  No new materials provided today    ASSESSMENT:  Tonia has been making great diet changes and taking her insulin. The medications are expensive though and she has been trialing NOT taking novolog with some meals to see what happens. For example, this morning she did not take any with breakfast and last night with dinner she did not take any. She tracy > 180 mg/dL following both but her glucose does come back down nicely. Would benefit from lowering her Novolog dose to see if we even need it with her diet changes.     Ideally, would like to be able to change to a GLP1 or SLGT 2 pending her lab work to see if she could come off insulin. However, with insulin being expensive for her already this might not be an option but  could change her to a LOPEZ as another option.       PLAN  See Patient Instructions for co-developed, patient-stated behavior change goals.  Reduce  Novolog 4-4-5-0 --> 3-3-3-0.   Continue Lantus 0-0-0-16  Aim for 30-45 grams of carb per meal with focus on plant based sources.   Realistically she can stop using the John since it is expensive for her and just use her meter that was provided and check her BG 3 times daily. She would like to continue using the john for now though.   After her labwork, if her TG's improve to < 400 would recommend a GLP1 medication if covered by insurance in hopes we could get off insulin.  Would recommend a once weekly such as Ozempic or Trulicity (ozempic preferred as it has better A1c lowering and weight loss results). Can educate her on this at our next visit if we go this direction.   AVS printed and provided to patient today. See Follow-Up section for recommended follow-up.    TAMMY Gonsalez CDE    Time Spent: 60 minutes  Encounter Type: Individual    Any diabetes medication dose changes were made via the CDE Protocol and Collaborative Practice Agreement with the patient's referring provider. A copy of this encounter was shared with the provider.

## 2020-02-27 NOTE — PATIENT INSTRUCTIONS
Reduce novolog to 3 units at all meals.     Continue Lantus 16 units at bedtime.     Can try roasted vegetables. Just cut up your veggies and add a little olive oil and seasoning and then place on a pan (tin foil lined) and preheat oven to 400 for 35-40 min.     [email or web address] for some recipe ideas (20 min recipe section)     Bring blood glucose meter and logbook with you to all doctor and follow-up appointments.    Diabetes Education Telephone Visit Follow-up:    We realize your time is valuable and your health is important! We offer a convenient Telephone Visit follow up! It s a quick way to check in for a medication dose adjustment without having to come back to clinic as soon.    Telephone Visits are often covered by insurance. Please check with your insurance plan to see if this type of visit is covered. If not, the cost is less expensive than an office visit:      Up to 10 minutes (Code 19871): $30    11-20 minutes (Code 89554): $59    More than 20 minutes (Code 17213): $85    Talk with your Diabetes Educator if you want to learn more.      [facility]:  For Your Diabetes Education and Nutrition Appointments Call:  [phone]   For Diabetes Education or Nutrition Related Questions:   Phone: [phone]  E-mail: [email or web address]  Fax: [phone]   If you need a medication refill please contact your pharmacy. Please allow 3 business days for your refills to be completed.

## 2020-02-27 NOTE — Clinical Note
ALEKSEY, her glucose results look really good. I reduced her novolog from 4-4-5-0 to 3 units at all meals as she is sometimes not taking any novolog but then rises closer to 200 or a bit over after the meal (but her current dose seems a little high). Hopefully her labwork comes back better in a couple weeks and we could switch to ozempic if affordable and just not need insulin as a thought. I scheduled a f/u in a month with her so we can educate on any new medication plan if needed after your visit with her. I did not change her Lantus dose today.  Since cost is a barrier for her I am not sure if we will be able to do a GLP1, SGLT2, or DPP4 and could always do a LOPEZ instead to try to get off the insulin since I know this is important to her and the insulin is a little too pricey too.  Just a couple ideas!  Let me know if you have questions!Yuli Chacon, TAMMY MAYORGA CDE

## 2020-03-06 ENCOUNTER — OFFICE VISIT (OUTPATIENT)
Dept: FAMILY MEDICINE | Facility: CLINIC | Age: 75
End: 2020-03-06
Payer: COMMERCIAL

## 2020-03-06 VITALS
OXYGEN SATURATION: 100 % | BODY MASS INDEX: 29.63 KG/M2 | SYSTOLIC BLOOD PRESSURE: 130 MMHG | TEMPERATURE: 96.7 F | WEIGHT: 207 LBS | DIASTOLIC BLOOD PRESSURE: 84 MMHG | HEIGHT: 70 IN | HEART RATE: 59 BPM

## 2020-03-06 DIAGNOSIS — E11.65 POORLY CONTROLLED DIABETES MELLITUS (H): ICD-10-CM

## 2020-03-06 DIAGNOSIS — I10 ESSENTIAL HYPERTENSION: ICD-10-CM

## 2020-03-06 DIAGNOSIS — N18.30 CKD (CHRONIC KIDNEY DISEASE) STAGE 3, GFR 30-59 ML/MIN (H): ICD-10-CM

## 2020-03-06 DIAGNOSIS — E11.65 POORLY CONTROLLED TYPE 2 DIABETES MELLITUS (H): ICD-10-CM

## 2020-03-06 LAB — HBA1C MFR BLD: 6.9 % (ref 0–5.6)

## 2020-03-06 PROCEDURE — 80061 LIPID PANEL: CPT | Performed by: INTERNAL MEDICINE

## 2020-03-06 PROCEDURE — 83036 HEMOGLOBIN GLYCOSYLATED A1C: CPT | Performed by: INTERNAL MEDICINE

## 2020-03-06 PROCEDURE — 36415 COLL VENOUS BLD VENIPUNCTURE: CPT | Performed by: INTERNAL MEDICINE

## 2020-03-06 PROCEDURE — 80048 BASIC METABOLIC PNL TOTAL CA: CPT | Performed by: INTERNAL MEDICINE

## 2020-03-06 PROCEDURE — 99214 OFFICE O/P EST MOD 30 MIN: CPT | Performed by: INTERNAL MEDICINE

## 2020-03-06 RX ORDER — METFORMIN HCL 500 MG
TABLET, EXTENDED RELEASE 24 HR ORAL
Qty: 120 TABLET | Refills: 3
Start: 2020-03-06 | End: 2020-05-28

## 2020-03-06 ASSESSMENT — MIFFLIN-ST. JEOR: SCORE: 1511.26

## 2020-03-06 NOTE — LETTER
[facility]  [address] [facility]  [address]  [place]  [address]  Tel: [phone]    March 9, 2020    Tonia Villavicencio  [address] [facility] [address]        Dear Ms. Villavicencio,    This is to inform you regarding your test result.     I spoke to you on phone but sending you a result note also.   HbA1c which is average glucose during last 3 months is 6.9%.   Your diabetes shows great improvement   I wanted to taper you off of insulin slowly but you mentioned that you ran out and cannot afford this insulin.   So I put you on glimepiride 4 mg daily.   Monitor your sugars and update us how medication is working for you   Continue metformin   Follow up with me in 6 weeks with blood sugar readings   Your total cholesterol is elevated.   Triglycerides have improved.   Your LDL which is called bad cholesterol is elevated.   Eat low cholesterol low fat  diet and do regular physical activity. Avoid high sugar containing food.   Start crestor 5 mg daily   Script is sent to pharmacy.           Sincerely,       Dr.Nasima Jaylene MD,FACP /SML      Enclosure: Lab Results  Results for orders placed or performed in visit on 03/06/20   Basic metabolic panel     Status: Abnormal   Result Value Ref Range    Sodium 139 133 - 144 mmol/L    Potassium 4.1 3.4 - 5.3 mmol/L    Chloride 106 94 - 109 mmol/L    Carbon Dioxide 29 20 - 32 mmol/L    Anion Gap 4 3 - 14 mmol/L    Glucose 125 (H) 70 - 99 mg/dL    Urea Nitrogen 23 7 - 30 mg/dL    Creatinine 0.95 0.52 - 1.04 mg/dL    GFR Estimate 59 (L) >60 mL/min/[1.73_m2]    GFR Estimate If Black 68 >60 mL/min/[1.73_m2]    Calcium 9.7 8.5 - 10.1 mg/dL   Lipid panel reflex to direct LDL Fasting     Status: Abnormal   Result Value Ref Range    Cholesterol 272 (H) <200 mg/dL    Triglycerides 190 (H) <150 mg/dL    HDL Cholesterol 58 >49 mg/dL    LDL Cholesterol Calculated 176 (H) <100 mg/dL    Non HDL Cholesterol 214 (H) <130 mg/dL   Hemoglobin A1c     Status: Abnormal   Result  Value Ref Range    Hemoglobin A1C 6.9 (H) 0 - 5.6 %

## 2020-03-06 NOTE — PROGRESS NOTES
Subjective     Tonia Villavicencio is a 74 year old female who presents to clinic today for the following health issues:    HPI   Diabetes Follow-up    How often are you checking your blood sugar? A few times a week  What time of day are you checking your blood sugars (select all that apply)?  1-2 times per day  Have you had any blood sugars above 200?  No  Have you had any blood sugars below 70?  No    What symptoms do you notice when your blood sugar is low?  None    What concerns do you have today about your diabetes? Other: Medication issues     Do you have any of these symptoms? (Select all that apply)  No numbness or tingling in feet.  No redness, sores or blisters on feet.  No complaints of excessive thirst.  No reports of blurry vision.  No significant changes to weight.    Patient is here for diabetes follow-up   Saw diabetic educator last week  Taking Lantus 16 units at night  Takes novolog 3 units before each meal  If she does not eat she does not take this  Sometimes she skips meals  Relates she skips meals because she is not hungry  Drinks ensure   She tried taking 4 tablets metformin a day  But this causes diarrhea  Diabetic educator suggested she take 3 tablets daily  Currently taking 1 tablet metformin in morning and 2 tablets in evening  Has not taken metformin today yet  Notes her Insurance does not cover free style tracy  Not doing much exercise  She is doing light weight lifting from renovating someone's house    BP Readings from Last 2 Encounters:   03/06/20 130/84   01/14/20 118/72     Hemoglobin A1C (%)   Date Value   03/06/2020 6.9 (H)   12/03/2019 >15.0 (H)     LDL Cholesterol Calculated (mg/dL)   Date Value   12/03/2019     Cannot estimate LDL when triglyceride exceeds 400 mg/dL   06/27/2019 173 (H)     LDL Cholesterol Direct (mg/dL)   Date Value   12/03/2019 192 (H)       How many servings of fruits and vegetables do you eat daily?  4 or more    On average, how many sweetened beverages do  you drink each day (Examples: soda, juice, sweet tea, etc.  Do NOT count diet or artificially sweetened beverages)?   0    How many days per week do you exercise enough to make your heart beat faster? 3 or less    Patient Active Problem List   Diagnosis     Essential hypertension     Hyperlipidemia     Type 2 diabetes mellitus without complication, without long-term current use of insulin (H)     Obesity, unspecified classification, unspecified obesity type, unspecified whether serious comorbidity present     CKD (chronic kidney disease) stage 3, GFR 30-59 ml/min (H)     Poorly controlled diabetes mellitus (H)     Past Surgical History:   Procedure Laterality Date     ARTHROSCOPY KNEE       GYN SURGERY      ovarian cyst removal and ovary       Social History     Tobacco Use     Smoking status: Never Smoker     Smokeless tobacco: Never Used   Substance Use Topics     Alcohol use: No     Alcohol/week: 0.0 standard drinks     Family History   Problem Relation Age of Onset     Heart Disease Mother      Prostate Cancer Father      Diabetes Brother      Breast Cancer No family hx of      Colon Cancer No family hx of            Current Outpatient Medications   Medication Sig Dispense Refill     insulin glargine (LANTUS SOLOSTAR) 100 UNIT/ML pen Inject 16 Units Subcutaneous At Bedtime 15 mL 3     metFORMIN (GLUCOPHAGE-XR) 500 MG 24 hr tablet Take one tablet every morning and take 2 tablets at night 120 tablet 3     alcohol swab prep pads Use to swab area of injection/cheryl as directed. 100 each 3     Alcohol Swabs (ALCOHOL WIPES) 70 % PADS 1 pad 4 times daily 100 each 11     aspirin 81 MG EC tablet Take 1 tablet (81 mg) by mouth daily 90 tablet 3     blood glucose (ACCU-CHEK GUIDE) test strip Use to test blood sugar 3 times daily or as directed. 100 each 6     blood glucose monitoring (ACCU-CHEK FASTCLIX) lancets Test 3 times a day 102 each 6     Continuous Blood Gluc  (FREESTYLE JENNY 14 DAY READER) SHANE 1 each as  "needed (diabetes testing) (Patient not taking: Reported on 3/6/2020) 1 Device 0     Continuous Blood Gluc Sensor (FREESTYLE JENNY 14 DAY SENSOR) MISC 1 Device every 14 days (Patient not taking: Reported on 3/6/2020) 6 each 3     insulin aspart (NOVOLOG FLEXPEN) 100 UNIT/ML pen 3 units before breakfast,3 units before lunch, 3 units before dinner. TDD 15 units with priming 15 mL 3     insulin pen needle (ULTICARE MICRO) 32G X 4 MM miscellaneous Use 4 pen needles daily or as directed. 200 each 3     losartan (COZAAR) 25 MG tablet TAKE 1 TABLET BY MOUTH EVERY DAY 90 tablet 1     metoprolol tartrate (LOPRESSOR) 50 MG tablet Take 1 tablet (50 mg) by mouth 2 times daily 180 tablet 3     ondansetron (ZOFRAN ODT) 4 MG ODT tab Take 1-2 tablets (4-8 mg) by mouth every 8 hours as needed for nausea 20 tablet 1     STATIN NOT PRESCRIBED, INTENTIONAL, Please choose reason not prescribed, below       triamterene-HCTZ (MAXZIDE) 75-50 MG tablet Take 1 tablet by mouth daily 90 tablet 3     Allergies   Allergen Reactions     No Known Allergies      Lisinopril Cough       Medications and Labs reviewed in EPIC    Reviewed and updated as needed this visit by Provider         Review of Systems   ROS COMP: Constitutional, HEENT, cardiovascular, pulmonary, GI, , musculoskeletal, neuro, skin, endocrine and psych systems are negative, except as otherwise noted.    This document serves as a record of the services and decisions personally performed and made by Blanca Mariee MD. It was created on her behalf by Yomaira Diaz, a trained medical scribe. The creation of this document is based on the provider's statements to the medical scribe.  Yomaira Diaz 10:51 AM March 6, 2020        Objective    /84   Pulse 59   Temp 96.7  F (35.9  C) (Oral)   Ht 1.765 m (5' 9.5\")   Wt 93.9 kg (207 lb)   LMP  (LMP Unknown)   SpO2 100%   BMI 30.13 kg/m    Body mass index is 30.13 kg/m .  Physical Exam   GENERAL: overweight, alert and no distress  PSYCH: " mentation appears normal, affect normal/bright    Diagnostic Test Results:  Labs reviewed in Epic  Results for orders placed or performed in visit on 03/06/20 (from the past 24 hour(s))   Hemoglobin A1c   Result Value Ref Range    Hemoglobin A1C 6.9 (H) 0 - 5.6 %           Assessment & Plan   Tonia was seen today for diabetes.    Diagnoses and all orders for this visit:    Poorly controlled diabetes mellitus (H)  She saw diabetic educator last week  She takes 16 units Lantus at night  Uses 3 units Novolog before meals but not taking lately  Skips this if she does not eat  Was not able to tolerate 4 tablets metformin  Has been taking 1 tablet in morning and 2 tablets at night per diabetic educator instructions  Has not taken metformin today yet  States her insurance does not cover free style tracy  Explained common SE of metformin is diarrhea  Discussed relion insulin from [employer] as option if she cannot afford Lantus  Continue with 1 tablet metformin in morning and 2 tablets in evening  Make follow-up appointment with diabetic educator  -     metFORMIN (GLUCOPHAGE-XR) 500 MG 24 hr tablet; Take one tablet every morning and take 2 tablets at night  -     Basic metabolic panel  -     Lipid panel reflex to direct LDL Fasting  -     Hemoglobin A1c    Poorly controlled type 2 diabetes mellitus (H)  See above  -     insulin glargine (LANTUS SOLOSTAR) 100 UNIT/ML pen; Inject 16 Units Subcutaneous At Bedtime    CKD (chronic kidney disease) stage 3, GFR 30-59 ml/min (H)  Doing well  -     Basic metabolic panel    Essential hypertension  BP elevated today (140/82)  BP rechecked in office and was 130/84  Monitor your blood pressure once a week at home.  Bring those readings on your next visit.  Notify us if your blood pressure readings consistently stays greater than 140/90.  -     Basic metabolic panel    Patient Instructions   Labs today  I refilled your prescriptions  Make appointment with diabetic educator  Can take 1  tablet metformin in morning and 2 tablets in evening  Follow-up in 3 months  Seek sooner medical attention if there is any worsening of symptoms or problems    The information in this document, created by the medical scribe for me, accurately reflects the services I personally performed and the decisions made by me. I have reviewed and approved this document for accuracy prior to leaving the patient care area.  March 6, 2020 11:18 AM    Blanca Mariee MD  [facility]

## 2020-03-06 NOTE — PATIENT INSTRUCTIONS
Labs today  I refilled your prescriptions  Make appointment with diabetic educator  Can take 1 tablet metformin in morning and 2 tablets in evening  Follow-up in 3 months  Seek sooner medical attention if there is any worsening of symptoms or problems

## 2020-03-07 LAB
ANION GAP SERPL CALCULATED.3IONS-SCNC: 4 MMOL/L (ref 3–14)
BUN SERPL-MCNC: 23 MG/DL (ref 7–30)
CALCIUM SERPL-MCNC: 9.7 MG/DL (ref 8.5–10.1)
CHLORIDE SERPL-SCNC: 106 MMOL/L (ref 94–109)
CHOLEST SERPL-MCNC: 272 MG/DL
CO2 SERPL-SCNC: 29 MMOL/L (ref 20–32)
CREAT SERPL-MCNC: 0.95 MG/DL (ref 0.52–1.04)
GFR SERPL CREATININE-BSD FRML MDRD: 59 ML/MIN/{1.73_M2}
GLUCOSE SERPL-MCNC: 125 MG/DL (ref 70–99)
HDLC SERPL-MCNC: 58 MG/DL
LDLC SERPL CALC-MCNC: 176 MG/DL
NONHDLC SERPL-MCNC: 214 MG/DL
POTASSIUM SERPL-SCNC: 4.1 MMOL/L (ref 3.4–5.3)
SODIUM SERPL-SCNC: 139 MMOL/L (ref 133–144)
TRIGL SERPL-MCNC: 190 MG/DL

## 2020-03-08 DIAGNOSIS — E78.5 HYPERLIPIDEMIA, UNSPECIFIED HYPERLIPIDEMIA TYPE: ICD-10-CM

## 2020-03-08 DIAGNOSIS — E11.9 TYPE 2 DIABETES MELLITUS WITHOUT COMPLICATION, WITHOUT LONG-TERM CURRENT USE OF INSULIN (H): Primary | ICD-10-CM

## 2020-03-08 RX ORDER — GLIMEPIRIDE 4 MG/1
4 TABLET ORAL
Qty: 90 TABLET | Refills: 0 | Status: SHIPPED | OUTPATIENT
Start: 2020-03-08 | End: 2020-03-11

## 2020-03-08 RX ORDER — ROSUVASTATIN CALCIUM 5 MG/1
5 TABLET, COATED ORAL DAILY
Qty: 90 TABLET | Refills: 1 | Status: SHIPPED | OUTPATIENT
Start: 2020-03-08 | End: 2020-08-25

## 2020-03-08 NOTE — RESULT ENCOUNTER NOTE
Please notify patient by sending following letter with copy of test results      Brisa Randall,    This is to inform you regarding your test result.    I spoke to you on phone but sending you a result note also.  HbA1c which is average glucose during last 3 months is 6.9%.  Your diabetes shows great improvement  I wanted to taper you off of insulin slowly but you mentioned that you ran out and cannot afford this insulin.  So I put you on glimepiride 4 mg daily.  Monitor your sugars and update us how medication is working for you  Continue metformin   Follow up with me in 6 weeks with blood sugar readings   Your total cholesterol is elevated.  Triglycerides have improved.  Your LDL which is called bad cholesterol is elevated.  Eat low cholesterol low fat  diet and do regular physical activity. Avoid high sugar containing food.  Start crestor 5 mg daily  Script is sent to pharmacy.          Sincerely,      Dr.Nasima Jaylene MD,FACP

## 2020-03-09 NOTE — RESULT ENCOUNTER NOTE
I already spoke to her and our staff would send the letter but  She does not want to buy any insulin as it is expensive  She ran out of her basal insulin.  So she will just take glimepiride and metformin  She has little bit of novolog.

## 2020-03-10 ENCOUNTER — TELEPHONE (OUTPATIENT)
Dept: FAMILY MEDICINE | Facility: CLINIC | Age: 75
End: 2020-03-10

## 2020-03-10 DIAGNOSIS — E11.9 TYPE 2 DIABETES MELLITUS WITHOUT COMPLICATION, WITHOUT LONG-TERM CURRENT USE OF INSULIN (H): ICD-10-CM

## 2020-03-10 NOTE — TELEPHONE ENCOUNTER
Inform the patient to lower the dose of her glimepiride to 2 mg daily instead of 4mg  So she can take only half a tablet of 4 mg daily instead  Please let us know how that works for her  Agree that she should take some carbohydrate to bring sugar up right now  We have discontinued her insulin already  Dr.Nasima Jaylene MD

## 2020-03-10 NOTE — TELEPHONE ENCOUNTER
"Spoke with Pt:     S: Hypoglycemia  BG is 76    B: Started Glimepiride 4mg this AM before breakfast     A:    BG in AM: 130   Took Medicine: 1 glimepiride, 1 Metformin   Ate breakfast:  (oatmeal)  Ate Lunch (bowl of chili, cheese, sr cream)  Took Medicine: 1 Metformin   Next check AFTER lunch 1230: 76    Drank small bottle of OJ and ginger ale (1/2 glass)     Checked at 3pm- still 76       Symptom Assessment:   Denies sweating  Denies shaking   Denies LOC   Denies confusion     She is having diarrhea from Metformin this afternoon- will start to take 1 tab in AM and 2 tabs in Evening starting tomorrow to see if that will help (avoid taking a lunch)     Pt does not take any insulin     Discussed that 76 is \"higher end of low\" but we would not want sugar to go any lower.     R: Recommended to eat something with carbohydrates- peanut butter toast, jelly OK. Recheck at 4pm     To PCP: Would you like to adjust medications soon after starting new regimen?     Thank you,   Yolanda CANO RN          "

## 2020-03-10 NOTE — TELEPHONE ENCOUNTER
Reason for call:  Patient reporting a symptom    Symptom or request: Hypoglycemia - no Sx    Duration (how long have symptoms been present): 03.10.2020 at 1:30pm    Have you been treated for this before? No    Additional comments: Pt started taking her new Rx of glimepiride (AMARYL) 4 MG tablet. She indicates her glucose number tanked when she took it the first time (in the 70's). She consumed 10oz orange juice in addition to ginger ale to raise her numbers    She says that her Rx might need adjusting    Phone Number patient can be reached at:  Cell number on file:    Telephone Information:   Mobile [phone]     Best Time:  any    Can we leave a detailed message on this number:  YES    Call taken on 3/10/2020 at 2:22 PM by Mary Turner

## 2020-03-10 NOTE — TELEPHONE ENCOUNTER
Call to patient went over recommendation from Dr. Mariee.  Patient understands and agrees with the plan.  States the Glimepiride 4 mg tabs are very small.  If she has trouble cutting these in half I instructed her to call back and we would reorder for her.  Blood sugar increased at 3 PM to 122. Patient had eaten a peanut butter sandwhich.  States she feels just fine.    Lavern Esparza RN

## 2020-03-11 RX ORDER — GLIMEPIRIDE 2 MG/1
2 TABLET ORAL 2 TIMES DAILY
Qty: 60 TABLET | Refills: 1 | Status: SHIPPED | OUTPATIENT
Start: 2020-03-11 | End: 2020-04-15

## 2020-03-11 NOTE — TELEPHONE ENCOUNTER
Please inform the patient that I have changed her dose from 4 mg once a day to 2 mg twice a day  and new prescription is sent   in that way she will not have to split the pill  She can take only 1 tablet a day if sugars are under good control  But she would have the option to take twice a day for elevated blood sugar  For the time being try 2 mg daily  Dr.Nasima Jaylene MD

## 2020-03-11 NOTE — TELEPHONE ENCOUNTER
To PCP:     Pt discusses concerns about Metformin- her friend had kidney problems after starting Metformin. Discussed that everyone reacts differently to medications, we certainly are monitoring her kidney function routinely, many people do tolerate the medication (Possible friend had underlying kidney problems, pt confirmed- she had cancer)     Will route to PCP to review Pt's concerns     Pt also expressed concerns about GI problems with medication, she took 1 dose this AM and plans to take 2 at bedtime as RX'd, asked her to update us on how she tolerates that dosing plan.     Please advise, as appropriate.     Thank you,   Yolanda CANO RN

## 2020-03-18 ENCOUNTER — PATIENT OUTREACH (OUTPATIENT)
Dept: EDUCATION SERVICES | Facility: CLINIC | Age: 75
End: 2020-03-18

## 2020-03-18 NOTE — PROGRESS NOTES
Called pt to offer either rescheduling our visit next week or doing a phone visit. Pt would like to proceed with a phone visit. Will plan to call at our scheduled time next Thursday for this visit.     Yuli Chacon RD LD CDE

## 2020-03-26 ENCOUNTER — ALLIED HEALTH/NURSE VISIT (OUTPATIENT)
Dept: EDUCATION SERVICES | Facility: CLINIC | Age: 75
End: 2020-03-26
Payer: COMMERCIAL

## 2020-03-26 DIAGNOSIS — E11.9 TYPE 2 DIABETES MELLITUS WITHOUT COMPLICATION, WITHOUT LONG-TERM CURRENT USE OF INSULIN (H): ICD-10-CM

## 2020-03-26 PROCEDURE — 98968 PH1 ASSMT&MGMT NQHP 21-30: CPT

## 2020-03-26 RX ORDER — BLOOD SUGAR DIAGNOSTIC
STRIP MISCELLANEOUS
Qty: 50 EACH | Refills: 6 | Status: SHIPPED | OUTPATIENT
Start: 2020-03-26 | End: 2020-11-17

## 2020-03-26 RX ORDER — LANCETS
EACH MISCELLANEOUS
Qty: 102 EACH | Refills: 6 | Status: SHIPPED | OUTPATIENT
Start: 2020-03-26

## 2020-03-26 NOTE — Clinical Note
ALEKSEY, her numbers all sound good. Just told her to reduce her testing to once/day since she is not on insulin and medicare only covers once daily testing. Removed her insulin, pen needles from her med list since she is no longer on those. Told her to follow up with you and I am here as needed for follow up!   Yuli Chacon, TAMMY LD CDE

## 2020-03-26 NOTE — PROGRESS NOTES
"  Diabetes Self-Management Education & Support    Presents for: Follow-up    SUBJECTIVE/OBJECTIVE:  Presents for: Follow-up  Diabetes education in the past 24mo: Yes  Focus of Visit: Monitoring, Healthy Eating, Taking Medication  Diabetes type: Type 2  Disease course: Improving  How confident are you filling out medical forms by yourself:: Not Assessed  Diabetes management related comments/concerns: Stopped using the tracy sensors due to cost. No longer on insulin as of her last PCP visit.   Transportation concerns: No  Difficulty affording diabetes medication?: Not since changing off the insulin and tracy sensors  Other concerns:: None  Cultural Influences/Ethnic Background:  American    Diabetes Symptoms & Complications:  Fatigue: No  Polydipsia: No  Polyuria: No  Visual change: No  Symptom course: Stable   Weight trend: Stable  Complications assessed today?: Yes  Nephropathy: Yes    Patient Problem List and Family Medical History reviewed for relevant medical history, current medical status, and diabetes risk factors.    Vitals:  LMP  (LMP Unknown)   Estimated body mass index is 30.13 kg/m  as calculated from the following:    Height as of 3/6/20: 1.765 m (5' 9.5\").    Weight as of 3/6/20: 93.9 kg (207 lb).   Last 3 BP:   BP Readings from Last 3 Encounters:   03/06/20 130/84   01/14/20 118/72   12/04/19 139/85       History   Smoking Status     Never Smoker   Smokeless Tobacco     Never Used       Labs:  Lab Results   Component Value Date    A1C 6.9 03/06/2020     Lab Results   Component Value Date     03/06/2020     Lab Results   Component Value Date     03/06/2020     HDL Cholesterol   Date Value Ref Range Status   03/06/2020 58 >49 mg/dL Final   ]  GFR Estimate   Date Value Ref Range Status   03/06/2020 59 (L) >60 mL/min/[1.73_m2] Final     Comment:     Non  GFR Calc  Starting 12/18/2018, serum creatinine based estimated GFR (eGFR) will be   calculated using the Chronic Kidney " Disease Epidemiology Collaboration   (CKD-EPI) equation.       GFR Estimate If Black   Date Value Ref Range Status   03/06/2020 68 >60 mL/min/[1.73_m2] Final     Comment:      GFR Calc  Starting 12/18/2018, serum creatinine based estimated GFR (eGFR) will be   calculated using the Chronic Kidney Disease Epidemiology Collaboration   (CKD-EPI) equation.       Lab Results   Component Value Date    CR 0.95 03/06/2020     No results found for: MICROALBUMIN    Healthy Eating:  Healthy Eating Assessed Today: Yes  Cultural/Zoroastrian diet restrictions?: No  Meals include: Breakfast, Lunch, Dinner  Breakfast: normally has oatmeal and blueberries  Lunch: 1/2 a sub sandwich today most likely OR had [employer] yesterday with a burger (single) and small bowl of chili  Dinner: chili (homemade with a little cheese)    Being Active:  Being Active Assessed Today: Yes  Exercise:: Currently not exercising  Barrier to exercise: Physical limitation (bone on bone in her knees)    Monitoring:  Monitoring Assessed Today: Yes  Did patient bring glucose meter to appointment? : Yes  Blood Glucose Meter: Accu chek meter  Checking a few times per day.     Had her read me her numbers off her meter the past couple days.  Yesterday 111 last night,   123 in the morning and then 157 after meal  Did have 1 number that was 190 but this went down to 130 and then 120.   No low blood sugars in the past few days.     Taking Medications:  Diabetes Medication(s)     Biguanides       metFORMIN (GLUCOPHAGE-XR) 500 MG 24 hr tablet    Take one tablet every morning and take 2 tablets at night    Sulfonylureas       glimepiride (AMARYL) 2 MG tablet    Take 1 tablet (2 mg) by mouth 2 times daily For diabetes      Removed novolog and insulin pen needles from med list as she is no longer taking.      Taking Medication Assessed Today: Yes  Current Treatments: Oral Medication (taken by mouth)  Given by: Patient  Problems taking diabetes medications  regularly?: No  Diabetes medication side effects?: No(when she spaces her metformin out she can tolerate it)    Problem Solving:  Problem Solving Assessed Today: Yes  Is the patient at risk for hypoglycemia?: Yes  Hypoglycemia Frequency: Rarely  No sx of low blood sugars that she has noticed.      Reducing Risks:  Reducing Risks Assessed Today: No    Healthy Coping:  Healthy Coping Assessed Today: Yes  Emotional response to diabetes: Ready to learn, Concern for health and well-being  Stage of change: ACTION (Actively working towards change)  Patient Activation Measure Survey Score:  No flowsheet data found.    Diabetes knowledge and skills assessment:   Patient is knowledgeable in diabetes management concepts related to: Healthy Eating, Being Active, Taking Medication, Reducing Risks and Healthy Coping    Patient needs further education on the following diabetes management concepts: Monitoring and Problem Solving    Based on learning assessment above, most appropriate setting for further diabetes education would be: Group class or Individual setting.      INTERVENTIONS:    Education provided today on:  AADE Self-Care Behaviors:  Healthy Eating: continuing to work on her diet changes and moderation with her portions.   Being Active: continued to encourage more PA as able.   Monitoring: log and interpret results and individual blood glucose targets. Educated that medicare only covers once daily testing when someone is not on insulin. To avoid paying extra, I encouraged her to reduce her BG monitoring with her meter.   Taking Medication: side effects of prescribed medications  Problem Solving: low blood glucose - causes, signs/symptoms, treatment and prevention and when to call health care provider    Opportunities for ongoing education and support in diabetes-self management were discussed.    Pt verbalized understanding of concepts discussed and recommendations provided today.       Education Materials Provided:  No  new materials provided today      ASSESSMENT:  Sinai is taking 2 mg glimepiride BID and reports this is going well and no low blood sugars recently. Is also taking Metformin spread out (1 in the morning, 1 at lunch, 1 in the afternoon, and 1 in the evening) for a total of 4 per day. Tolerates this medication okay if she spaces it out.     Is still working on her diet but feels she is doing okay. No exercise yet as her walking is limited d/t pain but she would like to increase this as able once the coronavirus situation improves.     Patient's most recent   Lab Results   Component Value Date    A1C 6.9 03/06/2020    is meeting goal of <7.0    PLAN  See Patient Instructions for co-developed, patient-stated behavior change goals.  Follow up as needed with this writer.   Reduce testing to once per day for her blood sugar. Check in the morning before breakfast. Did tell her to check it though if she suspects a low blood sugar.   Told her to call the clinic if she has some low blood sugars so we can reduce her glimepiride.   Follow up with PCP.   AVS printed and provided to patient today. See Follow-Up section for recommended follow-up.    TAMMY Gonsalez CDE    Time Spent: 24 minutes via phone  Encounter Type: Individual    Any diabetes medication dose changes were made via the CDE Protocol and Collaborative Practice Agreement with the patient's referring provider. A copy of this encounter was shared with the provider.

## 2020-04-05 DIAGNOSIS — E11.9 TYPE 2 DIABETES MELLITUS WITHOUT COMPLICATION, WITHOUT LONG-TERM CURRENT USE OF INSULIN (H): ICD-10-CM

## 2020-04-06 NOTE — TELEPHONE ENCOUNTER
"glimepiride (AMARYL) 2 MG tablet  60 tablet  1  3/11/2020     Last Written Prescription Date:  3/11/2020  Last Fill Quantity: 60,  # refills: 1   Last office visit: 3/6/2020 with prescribing provider: CAROLYN Mariee    Future Office Visit: Unknown     Requested Prescriptions   Pending Prescriptions Disp Refills     glimepiride (AMARYL) 2 MG tablet [Pharmacy Med Name: GLIMEPIRIDE 2 MG TABLET] 60 tablet 1     Sig: TAKE 1 TABLET (2 MG) BY MOUTH 2 TIMES DAILY FOR DIABETES       Sulfonylurea Agents Passed - 4/5/2020 11:32 AM        Passed - Patient has documented A1c within the specified period of time.     If HgbA1C is 8 or greater, it needs to be on file within the past 3 months.  If less than 8, must be on file within the past 6 months.     Recent Labs   Lab Test 03/06/20  1124   A1C 6.9*             Passed - Medication is active on med list        Passed - Patient is age 18 or older        Passed - No active pregnancy on record        Passed - Patient has a recent creatinine (normal) within the past 12 mos.     Recent Labs   Lab Test 03/06/20  1124   CR 0.95       Ok to refill medication if creatinine is low          Passed - Patient has not had a positive pregnancy test within the past 12 mos.        Passed - Recent (6 mo) or future (30 days) visit within the authorizing provider's specialty     Patient had office visit in the last 6 months or has a visit in the next 30 days with authorizing provider or within the authorizing provider's specialty.  See \"Patient Info\" tab in inbasket, or \"Choose Columns\" in Meds & Orders section of the refill encounter.                 "

## 2020-04-07 RX ORDER — GLIMEPIRIDE 2 MG/1
2 TABLET ORAL 2 TIMES DAILY
Qty: 60 TABLET | Refills: 1 | OUTPATIENT
Start: 2020-04-07

## 2020-04-14 ENCOUNTER — TELEPHONE (OUTPATIENT)
Dept: FAMILY MEDICINE | Facility: CLINIC | Age: 75
End: 2020-04-14

## 2020-04-14 DIAGNOSIS — E11.9 TYPE 2 DIABETES MELLITUS WITHOUT COMPLICATION, WITHOUT LONG-TERM CURRENT USE OF INSULIN (H): ICD-10-CM

## 2020-04-14 NOTE — TELEPHONE ENCOUNTER
"Called pt    Going down steps barefoot miss stepped one step  Caught herself with her left arm and it \"jolted\" her left shoulder  She took Ibuprofen - she doesn't have Tylenol  Cant raise arm, it hurts to raise it  Advised to rest and ice her shoulder for tonight and reassess tomorrow. Advised her to try limit movement tonight but possible starting moving again tomorrow. Advised to call back if she is still unable to move her shoulder tomorrow. Also recommended TCO    Alexis SIN RN  "

## 2020-04-14 NOTE — TELEPHONE ENCOUNTER
Reason for call:  Patient reporting a symptom    Symptom or request: fall down the stairs/ left arm sore    Duration (how long have symptoms been present): today    Have you been treated for this before? No    Additional comments: Tonia calling in to report her fall down the stair. She was barefooted and her foot slipped and she sat down on the stair. She was stiffening her left arm. She thinks she pulled a muscle around the shoulder. Doesn't think it's broken. Wants to know what she should do, take and recommendations.    Offered TE with [facility] but declined since she can't wait until Thurs. She then only wanted to speak with triage.    Please triage.    Phone Number patient can be reached at:  Cell number on file:    Telephone Information:   Mobile [phone]       Best Time:  any    Can we leave a detailed message on this number:  YES    Call taken on 4/14/2020 at 4:12 PM by Tavon Mcdermott

## 2020-04-15 RX ORDER — GLIMEPIRIDE 2 MG/1
2 TABLET ORAL 2 TIMES DAILY
Qty: 60 TABLET | Refills: 2 | Status: SHIPPED | OUTPATIENT
Start: 2020-04-15 | End: 2020-06-02 | Stop reason: ALTCHOICE

## 2020-04-15 NOTE — TELEPHONE ENCOUNTER
Refill request:    GLIMEPRIDE 2 mg tablet, Qty 60.    Summary: Take 1 tablet (2 mg) by mouth 2 times daily For diabetes, Disp-60 tablet,R-1, E-Prescribe  Note the change     Dose, Route, Frequency: 2 mg, Oral, 2 TIMES DAILY  Start: 3/11/2020  Ord/Sold: 3/11/2020

## 2020-04-16 ENCOUNTER — VIRTUAL VISIT (OUTPATIENT)
Dept: FAMILY MEDICINE | Facility: CLINIC | Age: 75
End: 2020-04-16
Payer: COMMERCIAL

## 2020-04-16 DIAGNOSIS — M25.512 ACUTE PAIN OF LEFT SHOULDER: Primary | ICD-10-CM

## 2020-04-16 PROCEDURE — 99213 OFFICE O/P EST LOW 20 MIN: CPT | Mod: 95 | Performed by: INTERNAL MEDICINE

## 2020-04-16 NOTE — PATIENT INSTRUCTIONS
Verbal instructions given  Please make appointment with orthopedics  They have walk in clinic from 8 am to 8 pm  Provided the phon number   Ice prn  Take Tylenol (acetaminophen) for fever or pain.     Adults can take either:    650 mg (two 325 mg pills) every 4 to 6 hours, or...   1,000 mg (two 500 mg pills) every 8 hours as needed.    Note: Don't take more than 3,000 mg in one day.    Follow up for diabetes in 2-3 months once covid-19 pandemic improves       Seek sooner medical attention if there is any worsening of symptoms or problems.

## 2020-04-16 NOTE — PROGRESS NOTES
"Tonia Villavicencio is a 75 year old female who is being evaluated via a billable telephone visit.      The patient has been notified of following:     This was a scheduled as a telephone visit instead of office visit due to coronavirus pandemic.      \"This telephone visit will be conducted via a call between you and your physician/provider. We have found that certain health care needs can be provided without the need for a physical exam.  This service lets us provide the care you need with a short phone conversation.  If a prescription is necessary we can send it directly to your pharmacy.  If lab work is needed we can place an order for that and you can then stop by our lab to have the test done at a later time.    Telephone visits are billed at different rates depending on your insurance coverage. During this emergency period, for some insurers they may be billed the same as an in-person visit.  Please reach out to your insurance provider with any questions.    If during the course of the call the physician/provider feels a telephone visit is not appropriate, you will not be charged for this service.\"    Patient has given verbal consent for Telephone visit?  Yes    How would you like to obtain your AVS? Verbal instructions given    Subjective     Tonia Villavicencio is a 75 year old female who presents to clinic today for the following health issues:        Patient is complaining of left shoulder pain  On Tuesday she was stepping down and stepped wrong and caught herself  Having so much pain in her shoulder  No other injuries.  She has very good sedation for pain but this is quite annoying and especially at night  She is having difficulty moving her left shoulder  Pain is worse at night  Diabetes is under control  She is icing the area and took some Advil but now she will start Tylenol.    Patient Active Problem List   Diagnosis     Essential hypertension     Hyperlipidemia     Type 2 diabetes mellitus without " complication, without long-term current use of insulin (H)     Obesity, unspecified classification, unspecified obesity type, unspecified whether serious comorbidity present     CKD (chronic kidney disease) stage 3, GFR 30-59 ml/min (H)     Poorly controlled diabetes mellitus (H)     Past Surgical History:   Procedure Laterality Date     ARTHROSCOPY KNEE       GYN SURGERY      ovarian cyst removal and ovary       Social History     Tobacco Use     Smoking status: Never Smoker     Smokeless tobacco: Never Used   Substance Use Topics     Alcohol use: No     Alcohol/week: 0.0 standard drinks     Family History   Problem Relation Age of Onset     Heart Disease Mother      Prostate Cancer Father      Diabetes Brother      Breast Cancer No family hx of      Colon Cancer No family hx of          Current Outpatient Medications   Medication Sig Dispense Refill     alcohol swab prep pads Use to swab area of injection/cheryl as directed. 100 each 3     aspirin 81 MG EC tablet Take 1 tablet (81 mg) by mouth daily 90 tablet 3     blood glucose (ACCU-CHEK GUIDE) test strip Use to test blood sugar 1 time daily or as directed. 50 each 6     blood glucose monitoring (ACCU-CHEK FASTCLIX) lancets Test 1 time a day 102 each 6     Continuous Blood Gluc  (FREESTYLE JENNY 14 DAY READER) SHANE 1 each as needed (diabetes testing) 1 Device 0     Continuous Blood Gluc Sensor (FREESTYLE JENNY 14 DAY SENSOR) MISC 1 Device every 14 days 6 each 3     glimepiride (AMARYL) 2 MG tablet Take 1 tablet (2 mg) by mouth 2 times daily For diabetes 60 tablet 2     losartan (COZAAR) 25 MG tablet TAKE 1 TABLET BY MOUTH EVERY DAY 90 tablet 1     metFORMIN (GLUCOPHAGE-XR) 500 MG 24 hr tablet Take one tablet every morning and take 2 tablets at night 120 tablet 3     metoprolol tartrate (LOPRESSOR) 50 MG tablet Take 1 tablet (50 mg) by mouth 2 times daily 180 tablet 3     rosuvastatin (CRESTOR) 5 MG tablet Take 1 tablet (5 mg) by mouth daily for cholestrol  90 tablet 1     triamterene-HCTZ (MAXZIDE) 75-50 MG tablet Take 1 tablet by mouth daily 90 tablet 3     ondansetron (ZOFRAN ODT) 4 MG ODT tab Take 1-2 tablets (4-8 mg) by mouth every 8 hours as needed for nausea (Patient not taking: Reported on 4/16/2020) 20 tablet 1     STATIN NOT PRESCRIBED, INTENTIONAL, Please choose reason not prescribed, below (Patient not taking: Reported on 4/16/2020)         Reviewed and updated as needed this visit by Provider         Review of Systems   ROS COMP: Constitutional, HEENT, cardiovascular, pulmonary, gi and gu systems are negative, except as otherwise noted.       Objective   Reported vitals:  LMP  (LMP Unknown)    healthy, alert and no distress  PSYCH: Alert and oriented times 3; coherent speech, normal   rate and volume, able to articulate logical thoughts, able   to abstract reason, no tangential thoughts, no hallucinations   or delusions  Her affect is normal  RESP: No cough, no audible wheezing, able to talk in full sentences  Remainder of exam unable to be completed due to telephone visits    Patient reports she is not able to move her left shoulder  It is very painful to move        Assessment/Plan:  1. Acute pain of left shoulder  Due to severity of the pain and the difficulty with moving I referred her to [facility] orthoped[facility]  - ORTHOPEDICS ADULT REFERRAL  Provided the phone number  Told her if she cannot get appointment to see someone today and she should go to the urgent care they are open from 8-8  Discuss with her take all the precautions due to COVID-19  Return in about 3 months (around 7/16/2020) for Diabetes.      Phone call duration:  6 minutes    Blanca Mariee MD

## 2020-04-23 ENCOUNTER — TRANSFERRED RECORDS (OUTPATIENT)
Dept: HEALTH INFORMATION MANAGEMENT | Facility: CLINIC | Age: 75
End: 2020-04-23

## 2020-06-02 DIAGNOSIS — E11.9 TYPE 2 DIABETES MELLITUS WITHOUT COMPLICATION, WITHOUT LONG-TERM CURRENT USE OF INSULIN (H): ICD-10-CM

## 2020-06-03 RX ORDER — GLIMEPIRIDE 4 MG/1
4 TABLET ORAL
Qty: 90 TABLET | Refills: 0 | OUTPATIENT
Start: 2020-06-03

## 2020-06-05 DIAGNOSIS — I10 ESSENTIAL HYPERTENSION: ICD-10-CM

## 2020-06-05 RX ORDER — LOSARTAN POTASSIUM 25 MG/1
TABLET ORAL
Qty: 90 TABLET | Refills: 0 | Status: SHIPPED | OUTPATIENT
Start: 2020-06-05 | End: 2020-10-19

## 2020-07-03 DIAGNOSIS — I10 ESSENTIAL HYPERTENSION: Primary | ICD-10-CM

## 2020-07-03 RX ORDER — METOPROLOL TARTRATE 50 MG
50 TABLET ORAL 2 TIMES DAILY
Qty: 180 TABLET | Refills: 0 | Status: SHIPPED | OUTPATIENT
Start: 2020-07-03 | End: 2020-10-01

## 2020-09-11 DIAGNOSIS — I10 ESSENTIAL HYPERTENSION: Primary | ICD-10-CM

## 2020-09-14 RX ORDER — TRIAMTERENE AND HYDROCHLOROTHIAZIDE 75; 50 MG/1; MG/1
1 TABLET ORAL DAILY
Qty: 90 TABLET | Refills: 2 | Status: SHIPPED | OUTPATIENT
Start: 2020-09-14

## 2020-10-19 DIAGNOSIS — E11.65 POORLY CONTROLLED DIABETES MELLITUS (H): ICD-10-CM

## 2020-10-20 RX ORDER — METFORMIN HCL 500 MG
2000 TABLET, EXTENDED RELEASE 24 HR ORAL
Qty: 360 TABLET | Refills: 0 | Status: SHIPPED | OUTPATIENT
Start: 2020-10-20

## 2020-10-20 NOTE — TELEPHONE ENCOUNTER
Filled per FM protocol. Patient has appointment scheduled for 11/17/2020 with provider.    JOSH OrtegaN, RN  [employer] [facility]

## 2020-10-27 DIAGNOSIS — I10 ESSENTIAL HYPERTENSION: ICD-10-CM

## 2020-10-27 NOTE — TELEPHONE ENCOUNTER
Refill request:    METORPROLOL TARTRATE 50 MG TAB    Summary: TAKE 1 TABLET BY MOUTH TWICE A DAY, Disp-60 tablet, R-0, E-Prescribe   Start: 10/19/2020  Ord/Sold: 10/19/2020

## 2020-10-28 ENCOUNTER — DOCUMENTATION ONLY (OUTPATIENT)
Dept: FAMILY MEDICINE | Facility: CLINIC | Age: 75
End: 2020-10-28

## 2020-10-28 DIAGNOSIS — E11.65 POORLY CONTROLLED DIABETES MELLITUS (H): Primary | ICD-10-CM

## 2020-10-28 DIAGNOSIS — E11.9 TYPE 2 DIABETES MELLITUS WITHOUT COMPLICATION, WITHOUT LONG-TERM CURRENT USE OF INSULIN (H): ICD-10-CM

## 2020-10-28 NOTE — PROGRESS NOTES
There are no orders for this patient for the upcoming lab visit. Please order labs as needed.     Reason for visit: Upcomming Appointment.     Thank you, lab.

## 2020-10-29 RX ORDER — METOPROLOL TARTRATE 50 MG
TABLET ORAL
Qty: 60 TABLET | Refills: 0 | OUTPATIENT
Start: 2020-10-29

## 2020-10-29 NOTE — TELEPHONE ENCOUNTER
Message sent to [facility]- Please contact [facility]- was ordered # 60 tablet R 0 on 10/19/2020     Pt has apt with PCP 11/17/20.  Tori Ramachandran RN

## 2020-11-19 ENCOUNTER — TELEPHONE (OUTPATIENT)
Dept: FAMILY MEDICINE | Facility: CLINIC | Age: 75
End: 2020-11-19

## 2020-11-19 DIAGNOSIS — E11.9 TYPE 2 DIABETES MELLITUS WITHOUT COMPLICATION, WITHOUT LONG-TERM CURRENT USE OF INSULIN (H): ICD-10-CM

## 2020-11-19 RX ORDER — FLASH GLUCOSE SENSOR
1 KIT MISCELLANEOUS
Qty: 6 EACH | Refills: 3 | Status: CANCELLED | OUTPATIENT
Start: 2020-11-19

## 2020-11-19 RX ORDER — FLASH GLUCOSE SENSOR
1 KIT MISCELLANEOUS
Qty: 6 EACH | Refills: 3 | Status: SHIPPED | OUTPATIENT
Start: 2020-11-19 | End: 2020-11-20

## 2020-11-19 RX ORDER — FLASH GLUCOSE SCANNING READER
1 EACH MISCELLANEOUS PRN
Qty: 1 DEVICE | Refills: 0 | Status: CANCELLED | OUTPATIENT
Start: 2020-11-19

## 2020-11-19 NOTE — TELEPHONE ENCOUNTER
Reason for call:  Order   Order or referral being requested: diabetes education  Reason for request: freestyle tracy  Date needed: before my next appointment  Has the patient been seen by the PCP for this problem? Not Applicable    Additional comments: Patient wants to get a handle on her diabetes. She can jump from 120 to 170. She checks her blood sugars 10x a day. She would like the freestyle tracy. Patient stated that she had a free one from [facility] before. She wants to do the right thing. She has an appointment on . She needs a referral in place before this appointment. Her last referral  on 11/15/2020.    Phone number to reach patient:  Home number on file [phone] (home) or Cell number on file:    Telephone Information:   Mobile [phone]       Best Time:  NA    Can we leave a detailed message on this number?  Not Applicable    Travel screening: Not Applicable     Jackie HANSON  Central Scheduler

## 2020-11-19 NOTE — TELEPHONE ENCOUNTER
Reason for Call:  Medication or medication refill:    Do you use a [facility]?  Name of the pharmacy and phone number for the current request:      [id number] - [place], [place] - [address]      Name of the medication requested:   Continuous Blood Gluc  (FREESTYLE JENNY 14 DAY READER) SHANE    Continuous Blood Gluc Sensor (FREESTYLE JENNY 14 DAY SENSOR) MISC    Other request:     Can we leave a detailed message on this number? YES    Phone number patient can be reached at: Home number on file [phone] (home)    Best Time: Any    Call taken on 11/19/2020 at 2:40 PM by Michelle Poon

## 2020-11-19 NOTE — TELEPHONE ENCOUNTER
Let patient know that referral to diabetic educator is done   Free style tracy is renewed   I sent it to [facility] pharmacy( I was not sure which pharmacy to send )  She is due for appointment   Have patient make the appointment with me also  Dr.Nasima Jaylene MD

## 2020-11-20 RX ORDER — FLASH GLUCOSE SENSOR
1 KIT MISCELLANEOUS
Qty: 12 EACH | Refills: 0 | Status: SHIPPED | OUTPATIENT
Start: 2020-11-20 | End: 2020-11-23

## 2020-11-20 RX ORDER — FLASH GLUCOSE SCANNING READER
1 EACH MISCELLANEOUS PRN
Qty: 1 DEVICE | Refills: 0 | Status: SHIPPED | OUTPATIENT
Start: 2020-11-20 | End: 2020-11-23

## 2020-11-20 NOTE — TELEPHONE ENCOUNTER
To PCP:     Pt has visit with DE on Monday 11/23 to discuss freestyle John    Would you like to fill ahead of time so that she has the equipment for her visit?     Thank you!  Yolanda CANO RN

## 2020-11-23 ENCOUNTER — PATIENT OUTREACH (OUTPATIENT)
Dept: EDUCATION SERVICES | Facility: CLINIC | Age: 75
End: 2020-11-23
Payer: COMMERCIAL

## 2020-11-23 DIAGNOSIS — E11.9 TYPE 2 DIABETES MELLITUS WITHOUT COMPLICATION, WITHOUT LONG-TERM CURRENT USE OF INSULIN (H): ICD-10-CM

## 2020-11-23 PROCEDURE — G0108 DIAB MANAGE TRN  PER INDIV: HCPCS | Mod: 95

## 2020-11-23 RX ORDER — FLASH GLUCOSE SCANNING READER
1 EACH MISCELLANEOUS PRN
Qty: 1 DEVICE | Refills: 0 | Status: SHIPPED | OUTPATIENT
Start: 2020-11-23 | End: 2020-12-01

## 2020-11-23 RX ORDER — FLASH GLUCOSE SENSOR
1 KIT MISCELLANEOUS
Qty: 9 EACH | Refills: 2 | Status: SHIPPED | OUTPATIENT
Start: 2020-11-23 | End: 2020-12-03

## 2020-11-23 NOTE — PROGRESS NOTES
"    Diabetes Self-Management Education & Support    Presents for: Follow-up    Patient verbally consented to the telephone visit service today: yes    Audio only visit done, as patient does not have access to audio-visual technology.    Individual visit provided, given no group classes are available for 2 months.       SUBJECTIVE/OBJECTIVE:  Presents for: Follow-up  Accompanied by: Self  Diabetes education in the past 24mo: Yes  Focus of Visit: Monitoring, Healthy Eating, Taking Medication  Diabetes type: Type 2  Disease course: Improving  How confident are you filling out medical forms by yourself:: Not Assessed  Diabetes management related comments/concerns: Needs to use a DME supplier for her John sensor.  Transportation concerns: No  Difficulty affording diabetes medication?: Yes(john sensors are not covered by insurance so she has to pay for them)  Other concerns:: None  Cultural Influences/Ethnic Background:  American    Diabetes Symptoms & Complications:  Fatigue: No  Polydipsia: No  Polyuria: No  Visual change: No  Symptom course: Stable  Weight trend: Stable  Complications assessed today?: Yes  Nephropathy: Yes    Patient Problem List and Family Medical History reviewed for relevant medical history, current medical status, and diabetes risk factors.    Vitals:  LMP  (LMP Unknown)   Estimated body mass index is 30.13 kg/m  as calculated from the following:    Height as of 3/6/20: 1.765 m (5' 9.5\").    Weight as of 3/6/20: 93.9 kg (207 lb).   Last 3 BP:   BP Readings from Last 3 Encounters:   03/06/20 130/84   01/14/20 118/72   12/04/19 139/85       History   Smoking Status     Never Smoker   Smokeless Tobacco     Never Used       Labs:  Lab Results   Component Value Date    A1C 6.9 03/06/2020     Lab Results   Component Value Date     03/06/2020     Lab Results   Component Value Date     03/06/2020     HDL Cholesterol   Date Value Ref Range Status   03/06/2020 58 >49 mg/dL Final   ]  GFR " Estimate   Date Value Ref Range Status   03/06/2020 59 (L) >60 mL/min/[1.73_m2] Final     Comment:     Non  GFR Calc  Starting 12/18/2018, serum creatinine based estimated GFR (eGFR) will be   calculated using the Chronic Kidney Disease Epidemiology Collaboration   (CKD-EPI) equation.       GFR Estimate If Black   Date Value Ref Range Status   03/06/2020 68 >60 mL/min/[1.73_m2] Final     Comment:      GFR Calc  Starting 12/18/2018, serum creatinine based estimated GFR (eGFR) will be   calculated using the Chronic Kidney Disease Epidemiology Collaboration   (CKD-EPI) equation.       Lab Results   Component Value Date    CR 0.95 03/06/2020     No results found for: MICROALBUMIN    Healthy Eating:  Healthy Eating Assessed Today: Yes  Cultural/Samaritan diet restrictions?: No  Meal planning/habits: Carb counting, Smaller portions  Meals include: Breakfast, Lunch, Dinner  Breakfast: Premier protein drink  Lunch: leftovers (chicken on a salad but is getting tired of salads)  Dinner: rotisserie chicken and green beans  Beverages: Water  Has patient met with a dietitian in the past?: Yes    Wants to go back to her books to see what to eat or not eat.   Can't eat popcorn or chips as they spike her numbers.   Needs to drink more water per pt report.  Has a hard time paying attention to her food.  Bought a squash and some asparagus recently. Limits her potatoes.    Being Active:  Being Active Assessed Today: Yes  Exercise:: Currently not exercising  Barrier to exercise: Physical limitation(pulled her shoulder and has bone on bone in her knees)    Monitoring:  Monitoring Assessed Today: Yes  Did patient bring glucose meter to appointment? : Yes  Times checking blood sugar at home (number): 5+  Times checking blood sugar at home (per): Day  Blood glucose trend: Fluctuating     today. Otherwise 112, 115 usually. Can get up to 180 after meals.     Taking Medications:  Diabetes Medication(s)      Biguanides       metFORMIN (GLUCOPHAGE-XR) 500 MG 24 hr tablet    TAKE 4 TABLETS (2,000 MG) BY MOUTH DAILY (WITH DINNER)    Sulfonylureas       glimepiride (AMARYL) 2 MG tablet    Take 1 tablet (2 mg) by mouth 2 times daily For diabetes        Can only take 1 Metformin at a time with meals. Otherwise she gets diarrhea and cramps but does not get this if she spaces it out.     Taking Medication Assessed Today: Yes  Current Treatments: Oral Medication (taken by mouth)  Given by: Patient  Problems taking diabetes medications regularly?: No  Diabetes medication side effects?: No(when she spaces her metformin out she can tolerate it)    Problem Solving:  Problem Solving Assessed Today: Yes  Is the patient at risk for hypoglycemia?: Yes  Hypoglycemia Frequency: Never    Reducing Risks:  Reducing Risks Assessed Today: No    Healthy Coping:  Healthy Coping Assessed Today: Yes  Emotional response to diabetes: Ready to learn, Concern for health and well-being  Stage of change: ACTION (Actively working towards change)  Patient Activation Measure Survey Score:  No flowsheet data found.    Diabetes knowledge and skills assessment:   Patient is knowledgeable in diabetes management concepts related to: Healthy Eating, Taking Medication, Problem Solving, Reducing Risks and Healthy Coping    Patient needs further education on the following diabetes management concepts: Being Active and Monitoring    Based on learning assessment above, most appropriate setting for further diabetes education would be: Group class or Individual setting.      INTERVENTIONS:    Education provided today on:  AADE Self-Care Behaviors:  Diabetes Pathophysiology  Healthy Eating: weight reduction and portion control. Discussed some websites to try for some healthy recipe ideas for some new meal ideas as she gets sick of salads.  Being Active: relationship to blood glucose and doing what she can  Monitoring: log and interpret results, individual blood  glucose targets and frequency of monitoring  Reducing Risks: major complications of diabetes    Opportunities for ongoing education and support in diabetes-self management were discussed.    Pt verbalized understanding of concepts discussed and recommendations provided today.       Education Materials Provided:  No new materials provided today      ASSESSMENT:  Pt's blood sugars are in target per report.  Is interested in the tracy sensor. Reports after talking to [employer] she needs to go through a DME. Will change her order today to a DME supplier that should be in her network. Discussed that usually medicare will not cover sensors if someone is not on insulin. Pt reports she was told she will only pay 20% which she is fine with.     Focused on diet today as well as she had some questions about lower calorie snack options and some other meal ideas.  Focused on some quick and healthy ideas.       Patient's most recent   Lab Results   Component Value Date    A1C 6.9 03/06/2020    is meeting goal of <7.0    PLAN  See Patient Instructions for co-developed, patient-stated behavior change goals.  Changed tracy order to Lori for a DME supplier.   Is overdue for her A1c to be checked. Has labs already ordered.  Focus on drinking more water.   Try to include some more exercise as able.   Can snack on non-starchy vegetables for a lower calorie snack option.   Can check out [employer] or [employer] for some recipe ideas.   AVS printed and provided to patient today. See Follow-Up section for recommended follow-up.    TAMMY Gonsalez CDE    Time Spent: 48 minutes  Encounter Type: Individual    Any diabetes medication dose changes were made via the CDE Protocol and Collaborative Practice Agreement with the patient's referring provider. A copy of this encounter was shared with the provider.

## 2020-12-01 ENCOUNTER — TELEPHONE (OUTPATIENT)
Dept: FAMILY MEDICINE | Facility: CLINIC | Age: 75
End: 2020-12-01

## 2020-12-01 DIAGNOSIS — E11.9 TYPE 2 DIABETES MELLITUS WITHOUT COMPLICATION, WITHOUT LONG-TERM CURRENT USE OF INSULIN (H): ICD-10-CM

## 2020-12-01 RX ORDER — FLASH GLUCOSE SCANNING READER
1 EACH MISCELLANEOUS PRN
Qty: 1 DEVICE | Refills: 0 | Status: SHIPPED | OUTPATIENT
Start: 2020-12-01 | End: 2020-12-03

## 2020-12-01 NOTE — TELEPHONE ENCOUNTER
Reason for Call:  Medication or medication refill:    Do you use a [facility]?  Name of the pharmacy and phone number for the current request:   [email or web address] - [place], [place] - [address] [phone]    Name of the medication requested: glucose monitor    Other request: pt needs this sent here so that insurance coverage is better    Can we leave a detailed message on this number? YES    Phone number patient can be reached at: Home number on file [phone] (home)    Best Time: any    Call taken on 12/1/2020 at 12:42 PM by Himanshu Augustine

## 2020-12-01 NOTE — TELEPHONE ENCOUNTER
"  History     Chief Complaint:  Shortness of Breath      HPI   Avery Arteaga is a 74 year old female with past medical history of COPD who presents to the emergency department for evaluation of shortness of breath.  The patient reports five days prior to arrival she developed a cough with associated shortness of breath. She isn't sure if this feels like a COPD exacerbation. Due to these symptoms she presented to the Urgent Care, and was referred to the emergency department today. Per daughter report she had a flu swab that was negative and a duoneb. She reports her son was sick with a cold about 1.5 weeks ago.She endorses nausea. She denies vomiting.     Allergies:  No Known Drug Allergies        Medications:    Albuterol  Levaquin  Deltasone    Past Medical History:    History reviewed. No pertinent past medical history.     Past Surgical History:    History reviewed. No pertinent past surgical history.     Family History:    History reviewed. No pertinent family history.      Social History:  The patient was accompanied to the ED by daughter.  Marital Status:   [4]    Review of Systems   Respiratory: Positive for cough and shortness of breath.    All other systems reviewed and are negative.    Physical Exam   First Vitals:  BP: (!) 159/121  Pulse: 75  Heart Rate: 108  Temp: 98.1  F (36.7  C)  Resp: 26  Height: 167.6 cm (5' 6\")  Weight: 74.5 kg (164 lb 4.8 oz)  SpO2: (!) 89 %    Physical Exam  General: Alert, appears well-developed and well-nourished. Cooperative.     In moderate distress, tachypeic.   HEENT:  Head:  Atraumatic  Ears:  External ears are normal  Mouth/Throat:  Oropharynx is without erythema or exudate and mucous membranes are moist.   Eyes:   Conjunctivae normal and EOM are normal. No scleral icterus.  CV:  Tachycardic rate, regular rhythm, normal heart sounds and radial pulses are 2+ and symmetric.  No murmur.  Resp:  Breath sounds are coarse bilaterally with diffuse expiratory " Resent to preferred pharm    Alexis SIN RN   wheezing.    Mild laboring, no retractions or accessory muscle use  GI:  Abdomen is soft, no distension, no tenderness. No rebound or guarding.  No CVA tenderness bilaterally  MS:  Normal range of motion. No edema.    Normal strength in all 4 extremities.     Back atraumatic.    No midline cervical, thoracic, or lumbar tenderness  Skin:  Warm and dry.  No rash or lesions noted.  Neuro: Alert. Normal strength.  GCS: 15  Psych:  Normal mood and affect.    Emergency Department Course     ECG:  Indication: shortness of breath   Completed at 1241.  Read at 1245.   Sinus rhythm with premature supraventricular complexes. Otherwise normal ECG.   Rate 89 bpm. WY interval 136. QRS duration 74. QT/QTc 360/438. P-R-T axes 74 61 66.     Imaging:  Radiology findings were communicated with the patient who voiced understanding of the findings.    Chest X-Ray. 2 Views:   IMPRESSION: 1.0 cm solitary pulmonary nodule. Consider CT scan of the  chest for further dilation.   reading per radiology.      Laboratory:  Laboratory findings were communicated with the patient who voiced understanding of the findings.    1) ISTAT gases lactate oscar POCT: pH Venous 7.22 (L), PCO2 Venous 64 (H), PO2 Venous 23 (L), Bicarbonate Venous 23, O2 Sat Venous 28, Lactic Acid 2.1    2) ISTAT gases lactate oscar POCT: pH Venous 7.25 (L), PCO2 Venous 61 (H), PO2 Venous 17 (L), Bicarbonate Venous 27, O2 Sat Venous 18, Lactic Acid 1.0      CBC: WBC 7.6, HGB 12.9,    CMP:  (L), Glucose 115 (H), Albumin 3.1 (L) o/w WNL. (Creatinine 0.80)   Troponin (Collected 1242): <0.015   Influenza A/B antigen: Negative  Respiratory Panel PCR: Pending on admission  RSV rapid antigen: Pending on admission    Blood cultures: Pending     Interventions:  1239 NS Bolus 1,000mL IV   1239 DuoNeb 3mL Nebulization    1239 Solu-medrol       Emergency Department Course:  Nursing notes and vitals reviewed.  1223: I performed an exam of the patient as documented above.    IV was  inserted and blood was drawn for laboratory testing, results above.    The patient was sent for a XR Chest while in the emergency department, results above.     EKG obtained in the ED, see results above.      1315 Patient rechecked and updated.       1404 Patient rechecked and updated.      1428 I spoke with Dr. Singh of the Hospitalist service regarding patient's presentation, findings, and plan of care.     Findings and plan explained to the Patient who consents to admission. Discussed the patient with Dr. Singh, who will admit the patient to a  bed for further monitoring, evaluation, and treatment.   I personally reviewed the laboratory and imaging results with the Patient and answered all related questions prior to  admission.   Impression & Plan      Medical Decision Making:  Avery Arteaga is a 74 year old female who presents for evaluation of shortness of breath and wheezing.  Signs and symptoms are consistent with COPD exacerbation.  A broad differential was considered including foreign body, reactive airway disease, pneumothorax, cardiac equivalent/ACS, viral induced wheezing, allergic phenomena, pneumonia, etc.  There are no signs at this point of any other serious etiologies including those mentioned above especially acute coronary syndrome. I doubt this is ACS given the classic story of COPD exacerbation given by the patient, the marked wheezing without rales and a nonspecific EKG.  No signs of pneumonia.  Patient feels improved after interventions here in ED but continues to have impairment in respiratory function including hypoxia and now requiring 3-4 LPM O2.  Given this, I will hospitalize for further intervention to Dr. Singh.      Diagnosis:    ICD-10-CM    1. COPD exacerbation (H) J44.1 Blood culture     Blood culture     Respiratory Panel PCR - NP Swab     RSV rapid antigen     RSV rapid antigen     CANCELED: RSV rapid antigen   2. Shortness of breath R06.02    3. Acute respiratory failure with  hypoxia (H) J96.01    4. Wheezing R06.2    5. Cough R05        Disposition:  Admitted  under the supervision of Dr. Francisco Mitchell Disclosure:  I, Addie Garg, am serving as a scribe at 12:27 PM on 1/11/2020 to document services personally performed by Chapin Coppola MD based on my observations and the provider's statements to me.      Addie Garg  1/11/2020   [facility] EMERGENCY DEPARTMENT       Chapin Coppola MD  01/11/20 [id number]

## 2020-12-02 DIAGNOSIS — I10 ESSENTIAL HYPERTENSION: ICD-10-CM

## 2020-12-03 ENCOUNTER — TELEPHONE (OUTPATIENT)
Dept: FAMILY MEDICINE | Facility: CLINIC | Age: 75
End: 2020-12-03

## 2020-12-03 DIAGNOSIS — E11.9 TYPE 2 DIABETES MELLITUS WITHOUT COMPLICATION, WITHOUT LONG-TERM CURRENT USE OF INSULIN (H): Primary | ICD-10-CM

## 2020-12-03 RX ORDER — PROCHLORPERAZINE 25 MG/1
1 SUPPOSITORY RECTAL
Qty: 3 EACH | Refills: 11 | Status: SHIPPED | OUTPATIENT
Start: 2020-12-03

## 2020-12-03 RX ORDER — METOPROLOL TARTRATE 50 MG
50 TABLET ORAL 2 TIMES DAILY
Qty: 60 TABLET | Refills: 3 | Status: SHIPPED | OUTPATIENT
Start: 2020-12-03

## 2020-12-03 RX ORDER — PROCHLORPERAZINE 25 MG/1
1 SUPPOSITORY RECTAL ONCE
Qty: 1 DEVICE | Refills: 0 | Status: SHIPPED | OUTPATIENT
Start: 2020-12-03 | End: 2020-12-03

## 2020-12-03 RX ORDER — PROCHLORPERAZINE 25 MG/1
1 SUPPOSITORY RECTAL
Qty: 1 EACH | Refills: 3 | Status: SHIPPED | OUTPATIENT
Start: 2020-12-03

## 2020-12-03 NOTE — TELEPHONE ENCOUNTER
Reason for Call:  Other diabetic supplies     Detailed comments: DEXCOM monitor is covered by [employer] insurance  Freestyle John is not, so wants the DEXCOM sent to [employer] mail order instead       Phone Number Patient can be reached at: Home number on file [phone] (home)    Best Time: any    Can we leave a detailed message on this number? YES    Call taken on 12/3/2020 at 10:29 AM by Ivanna Nix

## 2021-11-19 ENCOUNTER — HOSPITAL ENCOUNTER (OUTPATIENT)
Dept: MAMMOGRAPHY | Facility: CLINIC | Age: 76
Discharge: HOME | End: 2021-11-19
Attending: FAMILY MEDICINE | Admitting: FAMILY MEDICINE
Payer: COMMERCIAL

## 2021-11-19 DIAGNOSIS — Z12.31 ENCOUNTER FOR SCREENING MAMMOGRAM FOR BREAST CANCER: ICD-10-CM

## 2021-11-19 PROCEDURE — 77067 SCR MAMMO BI INCL CAD: CPT

## 2021-11-23 ENCOUNTER — TELEPHONE (OUTPATIENT)
Dept: FAMILY MEDICINE | Facility: CLINIC | Age: 76
End: 2021-11-23
Payer: COMMERCIAL

## 2021-11-23 DIAGNOSIS — E11.9 TYPE 2 DIABETES MELLITUS WITHOUT COMPLICATION, WITHOUT LONG-TERM CURRENT USE OF INSULIN (H): ICD-10-CM

## 2021-11-23 NOTE — TELEPHONE ENCOUNTER
Pt called, stated that someone left her a voice message asking why she switched doctors.     Pt called to let her care team know that she was forced to switch doctors due to her insurance changing to [employer].  States she liked the staff and her provider very much and it was unfortunate that she had to switch.

## 2021-11-23 NOTE — TELEPHONE ENCOUNTER
Routing refill request to provider for review/approval because:  Recent (6 mo) or future (30 days) visit within the authorizing provider's specialty    Last office vist: 4/16/2020    Pended 30 day supply.      Next office visit: none scheduled, called and left voice message to schedule     Marcella Tellez RN  [facility]

## 2021-11-26 RX ORDER — BLOOD SUGAR DIAGNOSTIC
STRIP MISCELLANEOUS
Qty: 50 STRIP | Refills: 0 | Status: SHIPPED | OUTPATIENT
Start: 2021-11-26

## 2021-11-26 NOTE — TELEPHONE ENCOUNTER
PCP: FYI patient switched providers due to insurance.    Per telephone encouter on 11/23/21  Jan Pacheco RN  [facility]

## 2021-11-26 NOTE — TELEPHONE ENCOUNTER
Please let patient know Rx refilled but will need to make an appointment with Dr. Mariee for additional refills

## 2021-12-15 NOTE — TELEPHONE ENCOUNTER
Pt needed to change clinics due to [employer] insurance.    Will not be seen in [facility].

## 2022-04-22 ENCOUNTER — MEDICAL CORRESPONDENCE (OUTPATIENT)
Dept: HEALTH INFORMATION MANAGEMENT | Facility: CLINIC | Age: 77
End: 2022-04-22
Payer: COMMERCIAL

## 2022-05-09 ENCOUNTER — ALLIED HEALTH/NURSE VISIT (OUTPATIENT)
Dept: EDUCATION SERVICES | Facility: CLINIC | Age: 77
End: 2022-05-09
Payer: COMMERCIAL

## 2022-05-09 DIAGNOSIS — E11.9 TYPE 2 DIABETES MELLITUS WITHOUT COMPLICATION, WITHOUT LONG-TERM CURRENT USE OF INSULIN (H): Primary | ICD-10-CM

## 2022-05-09 PROCEDURE — G0108 DIAB MANAGE TRN  PER INDIV: HCPCS | Performed by: DIETITIAN, REGISTERED

## 2022-05-09 NOTE — LETTER
"    5/9/2022         RE: Tonia Villavicencio  [address]  [place] [address]        Dear Colleague,    Thank you for referring your patient, Tonia Villavicencio, to the [facility]. Please see a copy of my visit note below.        Diabetes Self-Management Education & Support    Presents for: Individual review    Type of Visit: In Person      ASSESSMENT:  Pt would like to reverse her diabetes. Explained that weight loss, exercise, and smaller portions will be most helpful for her BG's and helping improve her diabetes. Focused on moderation with her diet and not eliminating foods or focusing on \"bad\" foods but trying to choose healthier ones more often and preparing more foods at home.  She reports trying to buy organic but that it is expensive. Educated on the dirty dozen list and clean 15. Explained that she could focus on buying the dirty foods as organic as a place to start. Showed her some websites to find healthier recipes and ways to make some quick and healthy meals at home (some simple salad and egg dish recipes for example).     No recent  A1c to review as it was not documented on her referral and pt does not remember it.     Diabetes knowledge and skills assessment:   Patient is knowledgeable in diabetes management concepts related to: Monitoring, Taking Medication, Problem Solving and Healthy Coping  Continue education with the following diabetes management concepts: Healthy Eating, Being Active and Reducing Risks  Based on learning assessment above, most appropriate setting for further diabetes education would be: Group class or Individual setting.    INTERVENTIONS:    Education provided today on:  AADE Self-Care Behaviors:  Healthy Eating: consistency in amount, composition, and timing of food intake, weight reduction, portion control and plate planning method. For her CKD, focused on controlling her BP, BG's, and a moderate amount of protein at meals.   Being " Active: relationship to blood glucose and ways she could exercise but with little or no impact on her knees (biking, swimming, etc). Praised her on getting a [employer] membership.   Monitoring: log and interpret results  Hypoglycemia treatment.  Reducing Risks: major complications of diabetes and how to keep her kidneys as healthy as possible (she mentioned stake 3 CKD).     Opportunities for ongoing education and support in diabetes-self management were discussed. Pt verbalized understanding of concepts discussed and recommendations provided today.       Education Materials Provided:  No new materials provided today          PLAN  Keep protein portion to 1/4 plate, starch to 1/4 plate, and vegetables to 1/2 plate.   [facility] [employer] and starting to [facility].   She will work on weight loss and trying to prepare her meals more at home.     Follow-up: as needed. She will call to schedule.     See Goals Section for co-developed, patient-stated behavior change goals.  AVS provided to patient today.          SUBJECTIVE / OBJECTIVE:  Presents for: Individual review  Accompanied by: Self  Diabetes education in the past 24mo: No  Focus of Visit: Healthy Eating  Diabetes type: Type 2  Disease course: Getting harder to manage  How confident are you filling out medical forms by yourself:: Not Assessed  Diabetes management related comments/concerns: Using John sensors.  Difficulty affording diabetes medication?: No  Difficulty affording diabetes testing supplies?: No  Cultural Influences/Ethnic Background:  Not  or     Diabetes Symptoms & Complications:  Weight trend: Stable  Complications assessed today?: Yes  Nephropathy: Yes (per pt)    Patient Problem List and Family Medical History reviewed for relevant medical history, current medical status, and diabetes risk factors.    Vitals:  LMP  (LMP Unknown)   Estimated body mass index is 30.13 kg/m  as calculated from the following:    Height as of 3/6/20: 1.765 m  "(5' 9.5\").    Weight as of 3/6/20: 93.9 kg (207 lb).   Last 3 BP:   BP Readings from Last 3 Encounters:   03/06/20 130/84   01/14/20 118/72   12/04/19 139/85       History   Smoking Status     Never Smoker   Smokeless Tobacco     Never Used       Labs:  Lab Results   Component Value Date    A1C 6.9 03/06/2020     Lab Results   Component Value Date     03/06/2020     Lab Results   Component Value Date     03/06/2020     HDL Cholesterol   Date Value Ref Range Status   03/06/2020 58 >49 mg/dL Final   ]  GFR Estimate   Date Value Ref Range Status   03/06/2020 59 (L) >60 mL/min/[1.73_m2] Final     Comment:     Non  GFR Calc  Starting 12/18/2018, serum creatinine based estimated GFR (eGFR) will be   calculated using the Chronic Kidney Disease Epidemiology Collaboration   (CKD-EPI) equation.       GFR Estimate If Black   Date Value Ref Range Status   03/06/2020 68 >60 mL/min/[1.73_m2] Final     Comment:      GFR Calc  Starting 12/18/2018, serum creatinine based estimated GFR (eGFR) will be   calculated using the Chronic Kidney Disease Epidemiology Collaboration   (CKD-EPI) equation.       Lab Results   Component Value Date    CR 0.95 03/06/2020     No results found for: MICROALBUMIN    Healthy Eating:  Healthy Eating Assessed Today: Yes  Cultural/Samaritan diet restrictions?: No  Meals include: Breakfast, Lunch, Dinner  Breakfast: breakfast sometimes OR egg and 1 piece of toast  OR protein drink  Lunch: chicken with a croissant OR salad  Dinner: trying to eat earlier then 6 - soup or salad or leftovers or fish and small potato  Snacks: small bowl of chips and salsa  Has patient met with a dietitian in the past?: Yes    Being Active:  Being Active Assessed Today: Yes  Exercise:: Currently not exercising (Just got a [employer] membership though)    Monitoring:  Monitoring Assessed Today: Yes  Did patient bring glucose meter to appointment? : Yes  Blood Glucose Meter: CGM (John)  Blood " glucose trend: Fluctuating    TIR 30% ( mg/dL) past 14 days  Below 70 2%  Above 127-240 64%  > 240 4%    Has post meal hyperglycemia noted.     Taking Medications:  Diabetes Medication(s)     Biguanides       metFORMIN (GLUCOPHAGE-XR) 500 MG 24 hr tablet    TAKE 4 TABLETS (2,000 MG) BY MOUTH DAILY (WITH DINNER)    Sulfonylureas       glimepiride (AMARYL) 2 MG tablet    Take 1 tablet (2 mg) by mouth 2 times daily For diabetes          Taking Medication Assessed Today: Yes  Current Treatments: Oral Medication (taken by mouth)  Problems taking diabetes medications regularly?: No  Diabetes medication side effects?: No    Problem Solving:  Problem Solving Assessed Today: Yes  Is the patient at risk for hypoglycemia?: Yes  Hypoglycemia Frequency: Rarely  Hypoglycemia Treatment: Other food (pop)              Reducing Risks:  Reducing Risks Assessed Today: Yes  Diabetes Risks: Age over 45 years, Sedentary Lifestyle, Family History  CAD Risks: Hypertension, Obesity, Post-menopausal, Sedentary lifestyle, Diabetes Mellitus    Healthy Coping:  Healthy Coping Assessed Today: Yes  Emotional response to diabetes: Ready to learn, Concern for health and well-being  Informal Support system:: Family  Stage of change: PREPARATION (Decided to change - considering how)  Patient Activation Measure Survey Score:  No flowsheet data found.          Yuli Chacon RD LD CDCES    Time Spent: 50 minutes  Encounter Type: Individual        Any diabetes medication dose changes were made via the Certified Diabetes Care & Education Protocol in collaboration with the patient's referring provider. A copy of this encounter was shared with the provider.

## 2022-05-09 NOTE — PATIENT INSTRUCTIONS
Diabetes Support Resources:  Websites: American Diabetes Association: www.diabetes.org    https://www.AnyMeeting.com/    https://www.Fluent Home.com/    Exercise will help lower your insulin resistance.     Try to keep protein portion to the  size of your palm (or 1/4  plate).     Try to aim for about half your plate as non starchy vegetables (brocoli, green beans, salad, pea pods, peppers).     Can check out the dirty dozen for 2022 to help with deciding on organic foods.  There is also the clean 15 which will be your lowest pesticide options.        Bring blood glucose meter and logbook with you to all doctor and follow-up appointments.    Diabetes Education Telephone Visit Follow-up:    We realize your time is valuable and your health is important! We offer a convenient Telephone Visit follow up! It s a quick way to check in for a medication dose adjustment without having to come back to clinic as soon.    Telephone Visits are often covered by insurance. Please check with your insurance plan to see if this type of visit is covered. If not, the cost is less expensive than an office visit:    Up to 10 minutes (Code 42304): $30  11-20 minutes (Code 48115): $59  More than 20 minutes (Code 68592): $85    Talk with your Diabetes Educator if you want to learn more.      [facility]:  For Your Diabetes Education and Nutrition Appointments Call:  [phone]   For Diabetes Education or Nutrition Related Questions:   Phone: [phone]  Send [facility] Message   If you need a medication refill please contact your pharmacy. Please allow 3 business days for your refills to be completed.

## 2022-05-09 NOTE — PROGRESS NOTES
"    Diabetes Self-Management Education & Support    Presents for: Individual review    Type of Visit: In Person      ASSESSMENT:  Pt would like to reverse her diabetes. Explained that weight loss, exercise, and smaller portions will be most helpful for her BG's and helping improve her diabetes. Focused on moderation with her diet and not eliminating foods or focusing on \"bad\" foods but trying to choose healthier ones more often and preparing more foods at home.  She reports trying to buy organic but that it is expensive. Educated on the dirty dozen list and clean 15. Explained that she could focus on buying the dirty foods as organic as a place to start. Showed her some websites to find healthier recipes and ways to make some quick and healthy meals at home (some simple salad and egg dish recipes for example).     No recent  A1c to review as it was not documented on her referral and pt does not remember it.     Diabetes knowledge and skills assessment:   Patient is knowledgeable in diabetes management concepts related to: Monitoring, Taking Medication, Problem Solving and Healthy Coping  Continue education with the following diabetes management concepts: Healthy Eating, Being Active and Reducing Risks  Based on learning assessment above, most appropriate setting for further diabetes education would be: Group class or Individual setting.    INTERVENTIONS:    Education provided today on:  AADE Self-Care Behaviors:  Healthy Eating: consistency in amount, composition, and timing of food intake, weight reduction, portion control and plate planning method. For her CKD, focused on controlling her BP, BG's, and a moderate amount of protein at meals.   Being Active: relationship to blood glucose and ways she could exercise but with little or no impact on her knees (biking, swimming, etc). Praised her on getting a [employer] membership.   Monitoring: log and interpret results  Hypoglycemia treatment.  Reducing Risks: major " "complications of diabetes and how to keep her kidneys as healthy as possible (she mentioned stake 3 CKD).     Opportunities for ongoing education and support in diabetes-self management were discussed. Pt verbalized understanding of concepts discussed and recommendations provided today.       Education Materials Provided:  No new materials provided today          PLAN  Keep protein portion to 1/4 plate, starch to 1/4 plate, and vegetables to 1/2 plate.   Work on getting [facility] [employer] and starting to [facility].   She will work on weight loss and trying to prepare her meals more at home.     Follow-up: as needed. She will call to schedule.     See Goals Section for co-developed, patient-stated behavior change goals.  AVS provided to patient today.          SUBJECTIVE / OBJECTIVE:  Presents for: Individual review  Accompanied by: Self  Diabetes education in the past 24mo: No  Focus of Visit: Healthy Eating  Diabetes type: Type 2  Disease course: Getting harder to manage  How confident are you filling out medical forms by yourself:: Not Assessed  Diabetes management related comments/concerns: Using John sensors.  Difficulty affording diabetes medication?: No  Difficulty affording diabetes testing supplies?: No  Cultural Influences/Ethnic Background:  Not  or     Diabetes Symptoms & Complications:  Weight trend: Stable  Complications assessed today?: Yes  Nephropathy: Yes (per pt)    Patient Problem List and Family Medical History reviewed for relevant medical history, current medical status, and diabetes risk factors.    Vitals:  LMP  (LMP Unknown)   Estimated body mass index is 30.13 kg/m  as calculated from the following:    Height as of 3/6/20: 1.765 m (5' 9.5\").    Weight as of 3/6/20: 93.9 kg (207 lb).   Last 3 BP:   BP Readings from Last 3 Encounters:   03/06/20 130/84   01/14/20 118/72   12/04/19 139/85       History   Smoking Status     Never Smoker   Smokeless Tobacco     Never Used       Labs:  Lab " Results   Component Value Date    A1C 6.9 03/06/2020     Lab Results   Component Value Date     03/06/2020     Lab Results   Component Value Date     03/06/2020     HDL Cholesterol   Date Value Ref Range Status   03/06/2020 58 >49 mg/dL Final   ]  GFR Estimate   Date Value Ref Range Status   03/06/2020 59 (L) >60 mL/min/[1.73_m2] Final     Comment:     Non  GFR Calc  Starting 12/18/2018, serum creatinine based estimated GFR (eGFR) will be   calculated using the Chronic Kidney Disease Epidemiology Collaboration   (CKD-EPI) equation.       GFR Estimate If Black   Date Value Ref Range Status   03/06/2020 68 >60 mL/min/[1.73_m2] Final     Comment:      GFR Calc  Starting 12/18/2018, serum creatinine based estimated GFR (eGFR) will be   calculated using the Chronic Kidney Disease Epidemiology Collaboration   (CKD-EPI) equation.       Lab Results   Component Value Date    CR 0.95 03/06/2020     No results found for: MICROALBUMIN    Healthy Eating:  Healthy Eating Assessed Today: Yes  Cultural/Christian diet restrictions?: No  Meals include: Breakfast, Lunch, Dinner  Breakfast: breakfast sometimes OR egg and 1 piece of toast  OR protein drink  Lunch: chicken with a croissant OR salad  Dinner: trying to eat earlier then 6 - soup or salad or leftovers or fish and small potato  Snacks: small bowl of chips and salsa  Has patient met with a dietitian in the past?: Yes    Being Active:  Being Active Assessed Today: Yes  Exercise:: Currently not exercising (Just got a [employer] membership though)    Monitoring:  Monitoring Assessed Today: Yes  Did patient bring glucose meter to appointment? : Yes  Blood Glucose Meter: CGM (John)  Blood glucose trend: Fluctuating    TIR 30% ( mg/dL) past 14 days  Below 70 2%  Above 127-240 64%  > 240 4%    Has post meal hyperglycemia noted.     Taking Medications:  Diabetes Medication(s)     Biguanides       metFORMIN (GLUCOPHAGE-XR) 500 MG 24 hr tablet     TAKE 4 TABLETS (2,000 MG) BY MOUTH DAILY (WITH DINNER)    Sulfonylureas       glimepiride (AMARYL) 2 MG tablet    Take 1 tablet (2 mg) by mouth 2 times daily For diabetes          Taking Medication Assessed Today: Yes  Current Treatments: Oral Medication (taken by mouth)  Problems taking diabetes medications regularly?: No  Diabetes medication side effects?: No    Problem Solving:  Problem Solving Assessed Today: Yes  Is the patient at risk for hypoglycemia?: Yes  Hypoglycemia Frequency: Rarely  Hypoglycemia Treatment: Other food (pop)              Reducing Risks:  Reducing Risks Assessed Today: Yes  Diabetes Risks: Age over 45 years, Sedentary Lifestyle, Family History  CAD Risks: Hypertension, Obesity, Post-menopausal, Sedentary lifestyle, Diabetes Mellitus    Healthy Coping:  Healthy Coping Assessed Today: Yes  Emotional response to diabetes: Ready to learn, Concern for health and well-being  Informal Support system:: Family  Stage of change: PREPARATION (Decided to change - considering how)  Patient Activation Measure Survey Score:  No flowsheet data found.          Yuli Chacon RD LD CDCES    Time Spent: 50 minutes  Encounter Type: Individual        Any diabetes medication dose changes were made via the Certified Diabetes Care & Education Protocol in collaboration with the patient's referring provider. A copy of this encounter was shared with the provider.

## 2022-10-01 NOTE — PROGRESS NOTES
"Subjective     Toniavipin Villavicencio is a 74 year old female who presents to clinic today for the following health issues:    HPI     Follow up on diabetes    Patient is crying when talking about her condition  She said I am sorry that I did not listen about diabetes  She was supposed to see diabetic educator but appointment got canceled and rescheduled for Thursday  Patient is working on diet and exercise and has lost weight  She is complaining of polyuria  She is having issues with her eyes and her eye doctor told her that she needs to take good care of her diabetes  She is not on any medications  In the past she has declined but now she is willing to go on that  She has bought a glucometer and going to see diabetic educator day after tomorrow  She says that it runs in my family and I think I cannot just controlled with diet      Reviewed and updated as needed this visit by Provider         Review of Systems   ROS COMP: Constitutional, HEENT, cardiovascular, pulmonary, GI, , musculoskeletal, neuro, skin, endocrine and psych systems are negative, except as otherwise noted.      Objective    /78 (BP Location: Right arm, Patient Position: Sitting, Cuff Size: Adult Regular)   Pulse 66   Temp 97  F (36.1  C) (Oral)   Ht 1.765 m (5' 9.5\")   Wt 90 kg (198 lb 8 oz)   LMP  (LMP Unknown)   SpO2 99%   BMI 28.89 kg/m    Body mass index is 28.89 kg/m .  Physical Exam   She is nice and pleasant comfortable not in any kind of distress  She is fully alert awake oriented            Assessment & Plan     Tonia was seen today for recheck.    Diagnoses and all orders for this visit:    Poorly controlled type 2 diabetes:  -     metFORMIN (GLUCOPHAGE-XR) 500 MG 24 hr tablet; Take 4 tablets (2,000 mg) by mouth daily (with dinner) Start with one tablet daily and increase one tablet  weekly upto 4 tablets daily as maintenance  -     Hemoglobin A1c  -     Comprehensive metabolic panel  -     Lipid panel reflex to direct LDL " "Fasting  -     LDL cholesterol direct  -     LDL cholesterol direct  Educated her about diabetes in its management  Discussed the complications she was seen by ophthalmologist a week ago  And having blurring of vision  Her ophthalmologist insisted that she should see someone for management of diabetes  She has appointment to see diabetic educator on Thursday  Her appointment got canceled and rescheduled  She is having polyuria  She is not on any medication at this point  I started her on metformin and educated her about that  Explained to her based on her A1c results we will have to add more medication    Screening for deficiency anemia  -     CBC with platelets    Urinary frequency  -     UA with Microscopic reflex to Culture    Other orders  -     PAF COMPLETED         BMI:   Estimated body mass index is 28.89 kg/m  as calculated from the following:    Height as of this encounter: 1.765 m (5' 9.5\").    Weight as of this encounter: 90 kg (198 lb 8 oz).   Weight management plan: Patient referred to endocrine and/or weight management specialty    Addendum:  Lab Results   Component Value Date    A1C >15.0 12/03/2019    A1C 7.9 06/27/2019    A1C 7.9 04/30/2018     Her blood sugar and a1c were very high.  I recommended that you should be on insulin  She has appointment with diabetic educator on Thursday  I sent the prescription of Lantus 10 units at bedtime to begin with  NovoLog 4 units before each meal  Her electrolytes are abnormal  That is due to poorly controlled diabetes  When I spoke to her she was in the pharmacy  She requested me to send the prescription right away so she can pick it up  I sent the  prescription electronically.  I spoke to the pharmacist to make sure they received it  They did not receive the prescription due to some issues with transmission  Gave them verbal order  Advised to substitute if needed  I will teach the patient how to do the insulin  As numbers are high so we need to get that he " started  After that she will keep her appointment with diabetic educator  I am going to refer her to endocrinology as well  She is going to come after my last patient tomorrow at 1230    The total visit time was 40 minutes more  than 50% was spent in counseling and coordination of care as discussed above.          Patient Instructions   Start with one tablet of metformin daily for one week and increase to two tablets daily for one week then do 3 tablets daily for one week then go to 4 tablets daily as maintenance   You should take OTC vitamin B12 1000 micrograms every day.  You are on Metformin that affects the absorption of vitamin B12.  Keep appointment with diabetic educator  Follow up in 2 months  Seek sooner medical attention if there is any worsening of symptoms or problems.        Return in about 2 months (around 2/3/2020) for medication follow up, Diabetes.    Blanca Mariee MD  [facility]         Warm/Dry

## 2022-10-07 ENCOUNTER — HOSPITAL ENCOUNTER (OUTPATIENT)
Dept: BONE DENSITY | Facility: CLINIC | Age: 77
Discharge: HOME | End: 2022-10-07
Attending: INTERNAL MEDICINE | Admitting: INTERNAL MEDICINE
Payer: COMMERCIAL

## 2022-10-07 DIAGNOSIS — Z78.0 POSTMENOPAUSAL: ICD-10-CM

## 2022-10-07 PROCEDURE — 77080 DXA BONE DENSITY AXIAL: CPT

## 2023-01-09 ENCOUNTER — TELEPHONE (OUTPATIENT)
Dept: WOUND CARE | Facility: CLINIC | Age: 78
End: 2023-01-09

## 2023-01-09 NOTE — TELEPHONE ENCOUNTER
[facility]    Who is the name of the provider?:  New    What is the location you see this provider at/preferred location?: [place]  Person calling: Self  Phone number:  [phone]  Nurse call back needed:  Not Applicable     Reason for call:   Requesting new appointment with Dr. Armstrong for diabetic neuropathy bilateral feet, given her name by [facility], PCP is at [facility].       Pharmacy location:  NA  Outside Imaging: Not Applicable   Can we leave a detailed message on this number?  YES

## 2023-01-09 NOTE — TELEPHONE ENCOUNTER
Call returned to patient to discuss. She reports she does not have wounds. Discussed with her that Dr. Armstrong could see her at the [place] location but the patient declines that as she does not want to drive to [place]. She will call another number she was given.

## 2023-10-24 ENCOUNTER — TRANSFERRED RECORDS (OUTPATIENT)
Dept: HEALTH INFORMATION MANAGEMENT | Facility: CLINIC | Age: 78
End: 2023-10-24

## 2023-10-24 LAB
CREATININE (EXTERNAL): 0.91 MG/DL (ref 0.52–1.04)
GFR ESTIMATED (EXTERNAL): >60 ML/MIN/1.7
GLUCOSE (EXTERNAL): 129 MG/DL (ref 70–99)
HBA1C MFR BLD: 6.1 %
LDL CHOLESTEROL CALCULATED (EXTERNAL): 64 MG/DL (ref 0–99)
POTASSIUM (EXTERNAL): 3.9 MMOL/L (ref 3.5–5.1)
TSH SERPL-ACNC: 1.97 UIU/ML (ref 0.47–4.68)

## 2024-01-11 ENCOUNTER — MEDICAL CORRESPONDENCE (OUTPATIENT)
Dept: SCHEDULING | Facility: CLINIC | Age: 79
End: 2024-01-11
Payer: COMMERCIAL

## 2024-01-26 ENCOUNTER — TRANSCRIBE ORDERS (OUTPATIENT)
Dept: OTHER | Age: 79
End: 2024-01-26

## 2024-01-26 DIAGNOSIS — D12.6 SESSILE SERRATED POLYP OF COLON: Primary | ICD-10-CM

## 2024-01-26 DIAGNOSIS — K57.30 DIVERTICULOSIS OF LARGE INTESTINE: ICD-10-CM

## 2024-02-06 ENCOUNTER — HOSPITAL ENCOUNTER (OUTPATIENT)
Facility: CLINIC | Age: 79
End: 2024-02-06
Attending: STUDENT IN AN ORGANIZED HEALTH CARE EDUCATION/TRAINING PROGRAM | Admitting: STUDENT IN AN ORGANIZED HEALTH CARE EDUCATION/TRAINING PROGRAM
Payer: COMMERCIAL

## 2024-02-06 ENCOUNTER — TELEPHONE (OUTPATIENT)
Dept: GASTROENTEROLOGY | Facility: CLINIC | Age: 79
End: 2024-02-06
Payer: COMMERCIAL

## 2024-02-06 DIAGNOSIS — D12.6 SESSILE SERRATED POLYP OF COLON: Primary | ICD-10-CM

## 2024-02-06 NOTE — TELEPHONE ENCOUNTER
"Endoscopy Scheduling Screen    Have you had a positive Covid test in the last 14 days?  No    Are you active on MyChart?   Yes    What insurance is in the chart?  Other:  [facility]     Ordering/Referring Provider: AMPARO MCKEON    (If ordering provider performs procedure, schedule with ordering provider unless otherwise instructed. )    BMI: Estimated body mass index is 30.13 kg/m  as calculated from the following:    Height as of 3/6/20: 1.765 m (5' 9.5\").    Weight as of 3/6/20: 93.9 kg (207 lb).     Sedation Ordered  moderate sedation.   If patient BMI > 50 do not schedule in [facility].    If patient BMI > 45 do not schedule at [facility].    Are you taking methadone or Suboxone?  No    Have you had difficulties, pain, or discomfort during past endoscopy procedures?  No    Are you taking any prescription medications for pain 3 or more times per week?   NO - No RN review required.    Do you have a history of malignant hyperthermia or adverse reaction to anesthesia?  No    (Females) Are you currently pregnant?   No     Have you been diagnosed or told you have pulmonary hypertension?   No    Do you have an LVAD?  No    Have you been told you have moderate to severe sleep apnea?  No    Have you been told you have COPD, asthma, or any other lung disease?  No    Do you have any heart conditions?  No     Have you ever had an organ transplant?   No    Have you ever had or are you awaiting a heart or lung transplant?   No    Have you had a stroke or transient ischemic attack (TIA aka \"mini stroke\" in the last 6 months?   No    Have you been diagnosed with or been told you have cirrhosis of the liver?   No    Are you currently on dialysis?   No    Do you need assistance transferring?   No    BMI: Estimated body mass index is 30.13 kg/m  as calculated from the following:    Height as of 3/6/20: 1.765 m (5' 9.5\").    Weight as of 3/6/20: 93.9 kg (207 lb).     Is patients BMI > 40 and scheduling location [facility]?  No    Do you take an injectable " medication for weight loss or diabetes (excluding insulin)? - ozempic   Yes, hold time can be up to 7 days. Please check with you prescribing provider for recommendation.     Do you take the medication Naltrexone?  No    Do you take blood thinners?  No       Prep   Are you currently on dialysis or do you have chronic kidney disease?  No    Do you have a diagnosis of diabetes?  Yes (Golytely Prep)    Do you have a diagnosis of cystic fibrosis (CF)?  No    On a regular basis do you go 3 -5 days between bowel movements?  No    BMI > 40?  No    Preferred Pharmacy:      [id number] [address] [address]  Phone: [phone] Fax: [phone]      Final Scheduling Details    Procedure scheduled  Colonoscopy    Surgeon:  Rene      Date of procedure:  05/10/2024     Pre-OP / PAC:   No - Not required for this site.    Location  [facility] - Per order.    Sedation   Moderate Sedation - Per order.      Patient Reminders:   You will receive a call from a Nurse to review instructions and health history.  This assessment must be completed prior to your procedure.  Failure to complete the Nurse assessment may result in the procedure being cancelled.      On the day of your procedure, please designate an adult(s) who can drive you home stay with you for the next 24 hours. The medicines used in the exam will make you sleepy. You will not be able to drive.      You cannot take public transportation, ride share services, or non-medical taxi service without a responsible caregiver.  Medical transport services are allowed with the requirement that a responsible caregiver will receive you at your destination.  We require that drivers and caregivers are confirmed prior to your procedure.

## 2024-02-06 NOTE — LETTER
April 10, 2024      Tonia FRANCISCO Maye  [address]  [facility] [address]              STANDARD Golytely (Colyte, Nulytely)  Prep Instructions for your Colonoscopy  Pre-Assessment Phone Number: [facility]; [phone] option 4      Please read these instructions carefully at least 7 days prior to your colonoscopy procedure. Be sure to follow all directions completely. The inside of your colon must be clean to allow for a complete examination for the presence of any growths, polyps, and/or abnormalities, as well as their biopsy or removal. A number of tips are included in order to make this part of the procedure as comfortable as possible.    Getting ready:   A nurse will call you to go over instructions and your health history.  It's important to complete the nurse assessment before your procedure. If you don't, your procedure might have to be canceled.  You must arrange for an adult to drive you home after your exam. Your colonoscopy cannot be done unless you have a ride. If you need to use public transportation, someone must ride with you and stay with you for a minimum of 6-24 hours.  Check with your insurance company to be sure they will cover this exam.  If you have diabetes:  Ask to have your exam early in the morning.  Also, ask your doctor if you should change your diet or medicines.    7 days before the exam:  Talk to your prescribing provider: If you take blood thinners (such as Coumadin, Plavix, Xarelto, Eliquis, Lovenox, or others), these medications may need to be stopped temporarily before your procedure. Your prescribing provider will tell you what to do.   Talk to your prescribing provider: If you take prescription NSAIDS (such as Sulindac, Celebrex, Mobic, Relafen). Your prescribing provider will tell you what to do.   Stop taking fiber supplements (bran, Metamucil, Fibercon), multi-vitamins with iron, and medicines that contain iron.  Continue taking prescribed aspirin; talk to  your prescribing doctor with any concerns.  Stop eating corn, popcorn, nuts and foods that contain seeds. These can stay in the colon for many days and they can clog up the colonoscope.     3 days before the exam:  Begin a low-fiber diet: No raw fruits or vegetables, whole wheat, seeds, nuts, popcorn or other high-fiber foods (see list below). No Olestra (a fat substitute).    One day before the exam:  You can have a light, low-fiber breakfast. But drink only clear liquids after 9 a.m. (see list below). Drink at least 8 to 10 full glasses of clear liquids during the day.   Stop taking NSAID pain relievers, such as Advil, Ibuprofen, Motrin, etc.  You may take Tylenol.  Fill the jug that contains the Golytely powder with warm water. Cover and shake until well mixed. Use a full gallon of water. Chill for 3 hours, but do not add ice.  Note: You will start drinking half of the Golytely solution at 6 p.m. The timing of drinking the 2nd half of the Golytely solution depends on your appointment arrival time. See Steps 1-2 below.    Step One:  At 3 p.m., take 2 tablets of Dulcolax (bisacodyl).  At 6 p.m. start drinking the Golytely solution. Drink an 8-ounce glass every 15 minutes until the jug is half empty. Drink each glass quickly.   After you start drinking the solution, stay near a toilet. You may have watery stools (diarrhea), mild cramping, bloating , and nausea.   You may want to use Vaseline on the skin around your anus after each bowel movement to prevent irritation. You can also use wet wipes to prevent irritation.    Step Two:   If you arrive before 11 AM:  At 11 p.m. on the day before your exam:  Take 2 Dulcolax (Bisacodyl) tablets.   Start drinking the other half of the Golytely jug. Drink one 8-ounce glass every 15 minutes until the jug is empty. Drink each glass quickly.  If you arrive after 11 AM:  At 6 AM on the day of the exam:  Take 2 tablets of Dulcolax (Bisacodyl).   Drink the other half of the Golytely  jug.   Drink one 8-ounce glass every 15 minutes until the jug is empty.   Drink each glass quickly.   You should finish the prep at least 4 hours before the exam.      Day of exam:    You may drink clear liquids only up until 2 hours before your arrival time.  You may take your necessary morning medications with sips of water  Do not take diabetes medicine by mouth until after your exam.  Do not smoke, chew tobacco, or swallow anything, including water or gum for at least 2 hours before your arrival time. This is a safety issue. Your procedure could be cancelled if you do not follow directions.  Please do not wear jewelry (i.e. earrings, rings, necklaces, watches, etc) . Leave your purse, billfold, credit cards, and other valuables at home.   Please arrive with a responsible adult who can take you home after the test and stay with you for a minimum of 24 hours: The medicine used will make you sleepy and forgetful. If you do not have someone to take you home, we will cancel your procedure. If using public transportation you must have someone to ride with you.  Please perform your nebulizer treatments and airway clearance therapy in the morning prior to the procedure (if applicable).  If you have asthma, bring your inhalers.      CLEAR LIQUID DIET   You may have:  Water, tea, coffee (no milk or cream)  Soda pop, Gatorade (not red or purple)  Jell-O, Popsicles (no milk or fruit pieces - not red or purple)  Fat-free soup broth or bouillon  Plain hard candy, such as clear life savers (not red or purple)  Clear juices and fruit-flavored drinks, such as apple juice, white grape juice, Hi-C, and Ron-Aid (not red or purple)   Do not have:  Milk or milk products such as ice cream, malts or shakes, or coffee creamer  Red or purple drinks of any kind such as cranberry juice or grape juice. Avoid red or purple Jell-O, Popsicles, Ron-Aid, sorbet, sherbet and candy  Juices with pulp such as orange, grapefruit, pineapple or  tomato juice  Cream soups of any kind  Alcohol and beer  Protein drinks or protein powder     LOW FIBER DIET   You may have:    Starches: White bread, rolls, biscuits, croissants, Priscilla toast, white flour tortillas, waffles, pancakes, Czech toast; white rice, noodles, pasta, macaroni; cooked and peeled potatoes; plain crackers, saltines; cooked farina or cream of rice; puffed rice, corn flakes, Rice Krispies, Special K   Vegetables: tender cooked and canned, vegetable broths  Fruits and fruit juices: Strained fruit juice, canned fruit without seeds or skin (not pineapple), applesauce, pear sauce, ripe bananas, melons (not watermelon)   Milk products: Milk (plain or flavored), cheese, cottage cheese, yogurt (no berries), custard, ice cream    Proteins: Tender, well-cooked ground beef, lamb, veal, ham, pork, chicken, turkey, fish or organ meats; eggs; creamy peanut butter   Fats and condiments:  Margarine, butter, oils, mayonnaise, sour cream, salad dressing, plain gravy; spices, cooked herbs; sugar, clear jelly, honey, syrup   Snacks, sweets and drinks: Pretzels, hard candy; plain cakes and cookies (no nuts or seeds); gelatin, plain pudding, sherbet, Popsicles; coffee, tea, carbonated ( fizzy ) drinks Do not have:    Starches: Breads or rolls that contain nuts, seeds or fruit; whole wheat or whole grain breads that contain more than 1 gram of fiber per slice; cornbread; corn or whole wheat tortillas; potatoes with skin; brown rice, wild rice, kasha (buckwheat), and oatmeal   Vegetables: Any raw or steamed vegetables; vegetables with seeds; corn in any form   Fruits and fruit juices: Prunes, prune juice, raisins and other dried fruits, berries and other fruits with seeds, canned pineapple juices with pulp such as orange, grapefruit, pineapple or tomato juice  Milk products: Any yogurt with nuts, seeds or berries   Proteins: Tough, fibrous meats with gristle; cooked dried beans, peas or lentils; crunchy peanut  butter  Fats and condiments: Pickles, olives, relish, horseradish; jam, marmalade, preserves   Snacks, sweets and drinks: Popcorn, nuts, seeds, granola, coconut, candies made with nuts or seeds; all desserts that contain nuts, seeds, raisins and other dried fruits, coconut, whole grains or bran.        FAQ:    How do you know if your colon is cleaned out?   After completing the bowel prep, your bowel movements should be all liquid and yellow. Your bowel movements will look similar to urine in the toilet. If there are pieces of stool (poop) in the toilet, or if you can't see to the bottom of the toilet, please call our office for advice. Call [phone] and ask to speak with a nurse.   Why do you need a responsible  to take you home and stay with you?  We require a responsible adult to take you home for your safety. The sedation medicines used to relax you during the procedure can impair your judgement and reaction time, make you forgetful and possible a little unsteady. Do not drive, make any important decisions, or sign any legal documents for 24 hours after your procedure.   It is normal to feel bloated and gassy after your procedure. Walking will help move the air through your colon. You can take non-aspirin pain relievers that contain acetaminophen (Tylenol).   When can you eat after your procedure?  You may resume your normal diet when you feel ready, unless advised otherwise by the doctor performing your procedure. Do not drink alcohol for 24 hours after your procedure.   You many resume normal activities (work, exercise, etc.) after 24 hours.   When will you get test results?  You should have your procedure results and any lab results (if applicable) by letter, MyChart message, or phone call within 2 weeks. If you have any questions, please call the doctor that referred you for the procedure.       Thank you for choosing  [facility], for your procedure. If you are sent a survey regarding your  care, please take the time to complete the questionnaire. We value your feedback!             Updated: 6/22/2022

## 2024-04-10 RX ORDER — BISACODYL 5 MG/1
TABLET, DELAYED RELEASE ORAL
Qty: 4 TABLET | Refills: 0 | Status: SHIPPED | OUTPATIENT
Start: 2024-04-10

## 2024-04-10 NOTE — TELEPHONE ENCOUNTER
Standard Golytely Bowel Prep. Instructions were sent via letter. Bowel prep was sent 4/10/2024 to    [id number] [place] [facility] [address]    Saadia Nguyen RN Colorectal Cancer    Division of Gastroenterology at [facility]

## 2024-04-15 ENCOUNTER — MEDICAL CORRESPONDENCE (OUTPATIENT)
Dept: HEALTH INFORMATION MANAGEMENT | Facility: CLINIC | Age: 79
End: 2024-04-15
Payer: COMMERCIAL

## 2024-04-15 ENCOUNTER — TRANSFERRED RECORDS (OUTPATIENT)
Dept: HEALTH INFORMATION MANAGEMENT | Facility: CLINIC | Age: 79
End: 2024-04-15
Payer: COMMERCIAL

## 2024-04-16 ENCOUNTER — TELEPHONE (OUTPATIENT)
Dept: SURGERY | Facility: CLINIC | Age: 79
End: 2024-04-16
Payer: COMMERCIAL

## 2024-04-16 NOTE — TELEPHONE ENCOUNTER
Referral received from Dr Sullivan for Parathyroid surgery    Attempt #1:    Called patient at [phone] (home)  . Called and spoke to pt at length. She has not scheduled her 4D scan as requested by Dr Sullivan. Pt was also concerned about not being able to get rides to [place] vs [facility]. I did let her know about Dr Colon and the [place] office. She was going to also call her ins to make sure both providers were in network and call Dr Sullivan to talk about the Drs further. She was given both numbers and advised to call back when wanting to schedule consult. Did advise that at this time we were booking into May already. Endo notes were sent to [facility] to scan on 4/16/24.

## 2024-04-26 ENCOUNTER — TELEPHONE (OUTPATIENT)
Dept: GASTROENTEROLOGY | Facility: CLINIC | Age: 79
End: 2024-04-26
Payer: COMMERCIAL

## 2024-04-26 NOTE — TELEPHONE ENCOUNTER
Pre visit planning completed.      Procedure details:    Patient scheduled for Colonoscopy  on 5/10/2024.     Arrival time: 1015. Procedure time 1100    Facility location: [facility]; [facility] [place] [address]. Check in location: [address] [facility] [place], operated by [facility].     Sedation type: Conscious sedation     Pre op exam needed? N/A    Indication for procedure:   Sessile serrated polyp of colon   Diverticulosis of large intestine         Chart review:     Electronic implanted devices? No    Recent diagnosis of diverticulitis within the last 6 weeks? No    Diabetic? Yes. Diabetic medication HOLDING recommendations: Oral diabetic medications: Yes:  Glimepiride (amaryl): HOLD day of procedure.  Metformin (glucophage): HOLD day of procedure.   Diabetic injectables: Yes- Ozempic (Semaglutide). Weekly dosing of medication.  Hold 7 days before procedure.  Follow up with managing provider.       Medication review:    Anticoagulants? No    NSAIDS? No NSAID medications per patient's medication list.  RN will verify with pre-assessment call.    Other medication HOLDING recommendations:  N/A      Prep for procedure:     Bowel prep recommendation: Standard Golytely. Bowel prep prescription sent by CRC nurse to    [facility]/PHARMACY #[id number] - [facility], [place] - [address]    Due to: CKD noted.  and diabetes.     Prep instructions sent 4/10 via letter         Kennedi Adams RN  Endoscopy Procedure Pre Assessment RN  [phone] option 4

## 2024-04-26 NOTE — LETTER
April 29, 2024      Tonia Villavicencio  [address]  [facility] [address]              Dear Tonia,      We apologize that we have been unable to contact you by phone. We are calling in regards to your upcoming Colonoscopy procedure that is scheduled on 5/10/24 to complete pre assessment.    Please contact our pre assessment nursing team at [phone] option 4, as soon as possible. We are open Monday through Friday, 7:00am to 5:00pm. Note that failure to complete Nurse assessment phone call will result in your procedure being cancelled.     Our schedules fill up quickly and are in high demand. If you know that you need to cancel, please contact us so that we can accommodate scheduling for other patients. Our endoscopy scheduling team can be reached at [phone] option 2.       Thank you,  [facility] Endoscopy Team                              Colonoscopy      Procedure date: 5/10/24    Anticipated arrival time: 10:15 AM   (Please note that arrival times may change)    Facility location: [facility]; [address] [place] [id number] - Check in location: 1st Floor [facility]. Parking information: Self pay parking available in [employer] [facility]. The [employer] [facility] is  located across [facility] from the hospital and is connected to the  hospital by a skyway. The skyway access is on Level C of the parking ramp.   parking is available Monday through Friday from 7 a.m.-3 p.m.  parking attendants are stationed at Door 2 at the [facility] entrance,  located off of [address]    Important Procedure Reminders:     Prep Instructions:   Instructions on how to prepare for your upcoming procedure are found below. Please read instructions carefully. Deviation from instructions may result in less than desired outcomes and procedure may need to be rescheduled.   If you have additional questions regarding how to prepare for your upcoming procedure, contact our endoscopy pre assessment nurses at  [phone] option 4 Monday through Friday 7:00am-5:00pm     Policy:   The medications used during the procedure will make you sleepy, so you won't be able to drive. On the day of your procedure, please have an adult ready to drive you home and stay with you for the next 6 hours.   You can't use public transportation, ride-share services, or non-medical taxi services without a responsible caregiver. Medical transport services are okay, but a caregiver must be there to receive you at your destination.   Make sure your  and caregiver are confirmed before your procedure.    Day of procedure:  Please keep in mind that arrival times may change. Let your  know there might be a one-hour window for changes.  We ask that you please check in at the  with your . Your  should remain on campus.  Expect to be at the procedure center for about 1.5-2.5 hours.    Please do not wear jewelry (i.e. earrings, rings, necklaces, watches, etc). Leave your purse, billfold, credit cards, and other valuables at home.   Bring insurance card and ID.     To cancel or reschedule your procedure:   Within 14 days of your procedure if you develop any flu-like symptoms (such as fever, cough, shortness of breath), COVID-19 like symptoms or exposure to COVID-19, contact our endoscopy team at [phone] option 4 to determine if procedure can be completed or needs to be delayed.   If you need to cancel or reschedule, our endoscopy scheduling team can be reached at [phone], option 2. Monday through Friday, 7:00am-5:00pm.      Medication Reminders:    Please note the following medication holding recommendations:   Oral diabetic medication(s): Glimepiride (amaryl): HOLD day of procedure.  Metformin (glucophage): HOLD day of procedure.  Injectable diabetic medication(s): Ozempic (Semaglutide). Weekly dosing of medication.  Hold 7 days before procedure.  Follow up with managing provider.      ----------------------------------------------------------------------------------------------------------------------------------------------------------------------------------------------------------------------------------------------------------------------      STANDARD Golytely (Colyte, Nulytely)  Prep Instructions for your Colonoscopy  Pre-Assessment Phone Number: [facility]; [phone] option 4    Bowel prep has been sent to:    [facility]Y #[id number] - [address]    Please read these instructions carefully at least 7 days prior to your colonoscopy procedure. Be sure to follow all directions completely. The inside of your colon must be clean to allow for a complete examination for the presence of any growths, polyps, and/or abnormalities, as well as their biopsy or removal. A number of tips are included in order to make this part of the procedure as comfortable as possible.    Getting ready:   A nurse will call you to go over instructions and your health history.  It's important to complete the nurse assessment before your procedure. If you don't, your procedure might have to be canceled.  You must arrange for an adult to drive you home after your exam. Your colonoscopy cannot be done unless you have a ride. If you need to use public transportation, someone must ride with you and stay with you for a minimum of 6-24 hours.  Check with your insurance company to be sure they will cover this exam.  If you have diabetes:  Ask to have your exam early in the morning.  Also, ask your doctor if you should change your diet or medicines.    7 days before the exam:  Talk to your prescribing provider: If you take blood thinners (such as Coumadin, Plavix, Xarelto, Eliquis, Lovenox, or others), these medications may need to be stopped temporarily before your procedure. Your prescribing provider will tell you what to do.   Talk to your prescribing provider: If you take  prescription NSAIDS (such as Sulindac, Celebrex, Mobic, Relafen). Your prescribing provider will tell you what to do.   Stop taking fiber supplements (bran, Metamucil, Fibercon), multi-vitamins with iron, and medicines that contain iron.  Continue taking prescribed aspirin; talk to your prescribing doctor with any concerns.  Stop eating corn, popcorn, nuts and foods that contain seeds. These can stay in the colon for many days and they can clog up the colonoscope.     3 days before the exam:  Begin a low-fiber diet: No raw fruits or vegetables, whole wheat, seeds, nuts, popcorn or other high-fiber foods (see list below). No Olestra (a fat substitute).      One day before the exam:  You can have a light, low-fiber breakfast. But drink only clear liquids after 9 a.m. (see list below). Drink at least 8 to 10 full glasses of clear liquids during the day.   Stop taking NSAID pain relievers, such as Advil, Ibuprofen, Motrin, etc.  You may take Tylenol.  Fill the jug that contains the Golytely powder with warm water. Cover and shake until well mixed. Use a full gallon of water. Chill for 3 hours, but do not add ice.  Note: You will start drinking half of the Golytely solution at 6 p.m. The timing of drinking the 2nd half of the Golytely solution depends on your appointment arrival time. See Steps 1-2 below.      Step One:  At 3 p.m., take 2 tablets of Dulcolax (bisacodyl).  At 6 p.m. start drinking the Golytely solution. Drink an 8-ounce glass every 15 minutes until the jug is half empty. Drink each glass quickly.   After you start drinking the solution, stay near a toilet. You may have watery stools (diarrhea), mild cramping, bloating , and nausea.   You may want to use Vaseline on the skin around your anus after each bowel movement to prevent irritation. You can also use wet wipes to prevent irritation.      Step Two:   If you arrive before 11 AM:  At 11 p.m. on the day before your exam:  Take 2 Dulcolax (Bisacodyl)  tablets.   Start drinking the other half of the Golytely jug. Drink one 8-ounce glass every 15 minutes until the jug is empty. Drink each glass quickly.  I    Day of exam:    You may drink clear liquids only up until 2 hours before your arrival time.  You may take your necessary morning medications with sips of water  Do not take diabetes medicine by mouth until after your exam.  Do not smoke, chew tobacco, or swallow anything, including water or gum for at least 2 hours before your arrival time. This is a safety issue. Your procedure could be cancelled if you do not follow directions.  Please do not wear jewelry (i.e. earrings, rings, necklaces, watches, etc) . Leave your purse, billfold, credit cards, and other valuables at home.   Please arrive with a responsible adult who can take you home after the test and stay with you for a minimum of 24 hours: The medicine used will make you sleepy and forgetful. If you do not have someone to take you home, we will cancel your procedure. If using public transportation you must have someone to ride with you.  Please perform your nebulizer treatments and airway clearance therapy in the morning prior to the procedure (if applicable).  If you have asthma, bring your inhalers.      CLEAR LIQUID DIET   You may have:  Water, tea, coffee (no milk or cream)  Soda pop, Gatorade (not red or purple)  Jell-O, Popsicles (no milk or fruit pieces - not red or purple)  Fat-free soup broth or bouillon  Plain hard candy, such as clear life savers (not red or purple)  Clear juices and fruit-flavored drinks, such as apple juice, white grape juice, Hi-C, and Ron-Aid (not red or purple)   Do not have:  Milk or milk products such as ice cream, malts or shakes, or coffee creamer  Red or purple drinks of any kind such as cranberry juice or grape juice. Avoid red or purple Jell-O, Popsicles, Ron-Aid, sorbet, sherbet and candy  Juices with pulp such as orange, grapefruit, pineapple or tomato  juice  Cream soups of any kind  Alcohol and beer  Protein drinks or protein powder           LOW FIBER DIET   You may have:    Starches: White bread, rolls, biscuits, croissants, Whiting toast, white flour tortillas, waffles, pancakes, Cameroonian toast; white rice, noodles, pasta, macaroni; cooked and peeled potatoes; plain crackers, saltines; cooked farina or cream of rice; puffed rice, corn flakes, Rice Krispies, Special K   Vegetables: tender cooked and canned, vegetable broths  Fruits and fruit juices: Strained fruit juice, canned fruit without seeds or skin (not pineapple), applesauce, pear sauce, ripe bananas, melons (not watermelon)   Milk products: Milk (plain or flavored), cheese, cottage cheese, yogurt (no berries), custard, ice cream    Proteins: Tender, well-cooked ground beef, lamb, veal, ham, pork, chicken, turkey, fish or organ meats; eggs; creamy peanut butter   Fats and condiments:  Margarine, butter, oils, mayonnaise, sour cream, salad dressing, plain gravy; spices, cooked herbs; sugar, clear jelly, honey, syrup   Snacks, sweets and drinks: Pretzels, hard candy; plain cakes and cookies (no nuts or seeds); gelatin, plain pudding, sherbet, Popsicles; coffee, tea, carbonated ( fizzy ) drinks Do not have:    Starches: Breads or rolls that contain nuts, seeds or fruit; whole wheat or whole grain breads that contain more than 1 gram of fiber per slice; cornbread; corn or whole wheat tortillas; potatoes with skin; brown rice, wild rice, kasha (buckwheat), and oatmeal   Vegetables: Any raw or steamed vegetables; vegetables with seeds; corn in any form   Fruits and fruit juices: Prunes, prune juice, raisins and other dried fruits, berries and other fruits with seeds, canned pineapple juices with pulp such as orange, grapefruit, pineapple or tomato juice  Milk products: Any yogurt with nuts, seeds or berries   Proteins: Tough, fibrous meats with gristle; cooked dried beans, peas or lentils; crunchy peanut  butter  Fats and condiments: Pickles, olives, relish, horseradish; jam, marmalade, preserves   Snacks, sweets and drinks: Popcorn, nuts, seeds, granola, coconut, candies made with nuts or seeds; all desserts that contain nuts, seeds, raisins and other dried fruits, coconut, whole grains or bran.            FAQ:    How do you know if your colon is cleaned out?   After completing the bowel prep, your bowel movements should be all liquid and yellow. Your bowel movements will look similar to urine in the toilet. If there are pieces of stool (poop) in the toilet, or if you can't see to the bottom of the toilet, please call our office for advice. Call [phone] and ask to speak with a nurse.   Why do you need a responsible  to take you home and stay with you?  We require a responsible adult to take you home for your safety. The sedation medicines used to relax you during the procedure can impair your judgement and reaction time, make you forgetful and possible a little unsteady. Do not drive, make any important decisions, or sign any legal documents for 24 hours after your procedure.   It is normal to feel bloated and gassy after your procedure. Walking will help move the air through your colon. You can take non-aspirin pain relievers that contain acetaminophen (Tylenol).   When can you eat after your procedure?  You may resume your normal diet when you feel ready, unless advised otherwise by the doctor performing your procedure. Do not drink alcohol for 24 hours after your procedure.   You many resume normal activities (work, exercise, etc.) after 24 hours.   When will you get test results?  You should have your procedure results and any lab results (if applicable) by letter, MyChart message, or phone call within 2 weeks. If you have any questions, please call the doctor that referred you for the procedure.       Thank you for choosing [facility], for your procedure. If you are sent a survey regarding  your care, please take the time to complete the questionnaire. We value your feedback!             Updated: 6/22/2022

## 2024-04-29 NOTE — TELEPHONE ENCOUNTER
Attempted to contact patient in order to complete pre assessment questions.     No answer. Left message to return call to [phone] option 4    Callback required communication sent via letter with prep instructions as well.      Danette Vuong RN  Endoscopy Procedure Pre Assessment RN

## 2024-05-01 NOTE — TELEPHONE ENCOUNTER
Pre assessment completed for upcoming procedure.   (Please see previous telephone encounter notes for complete details)      Procedure details:    Arrival time and facility location reviewed.    Pre op exam needed? N/A    Designated  policy reviewed. Instructed to have someone stay 6  hours post procedure.       Medication review:    Medications reviewed. Please see supporting documentation below. Holding recommendations discussed (if applicable).       Prep for procedure:     Procedure prep instructions reviewed.        Any additional information needed:  Pt declined PRN zofran prescription.  Patient was concerned with the volume of Golytely. RN discussed spacing out glasses of Golytely to every 20-25 min if needed.      Patient  verbalized understanding and had no questions or concerns at this time.      Silvia Vyas RN  Endoscopy Procedure Pre Assessment RN  [phone] option 4

## 2024-05-03 ENCOUNTER — ANCILLARY PROCEDURE (OUTPATIENT)
Dept: CT IMAGING | Facility: CLINIC | Age: 79
End: 2024-05-03
Attending: INTERNAL MEDICINE
Payer: COMMERCIAL

## 2024-05-03 DIAGNOSIS — E21.3 HYPERPARATHYROIDISM (H): ICD-10-CM

## 2024-05-03 LAB
CREAT BLD-MCNC: 1 MG/DL (ref 0.5–1)
EGFRCR SERPLBLD CKD-EPI 2021: 57 ML/MIN/1.73M2

## 2024-05-03 PROCEDURE — 250N000009 HC RX 250: Performed by: INTERNAL MEDICINE

## 2024-05-03 PROCEDURE — 70492 CT SFT TSUE NCK W/O & W/DYE: CPT

## 2024-05-03 PROCEDURE — 250N000011 HC RX IP 250 OP 636: Performed by: INTERNAL MEDICINE

## 2024-05-03 PROCEDURE — 82565 ASSAY OF CREATININE: CPT

## 2024-05-03 RX ORDER — IOPAMIDOL 755 MG/ML
90 INJECTION, SOLUTION INTRAVASCULAR ONCE
Status: COMPLETED | OUTPATIENT
Start: 2024-05-03 | End: 2024-05-03

## 2024-05-03 RX ADMIN — SODIUM CHLORIDE 40 ML: 9 INJECTION, SOLUTION INTRAVENOUS at 12:47

## 2024-05-03 RX ADMIN — IOPAMIDOL 90 ML: 755 INJECTION, SOLUTION INTRAVENOUS at 12:47

## 2024-05-06 ENCOUNTER — TELEPHONE (OUTPATIENT)
Dept: GASTROENTEROLOGY | Facility: CLINIC | Age: 79
End: 2024-05-06
Payer: COMMERCIAL

## 2024-05-06 NOTE — TELEPHONE ENCOUNTER
Caller: Tonia Villavicencio      Reason for Reschedule/Cancellation   (please be detailed, any staff messages or encounters to note?): PT HAS OTHER MEDICAL ISSUES SHE WOULD LIKE TO DEAL WITH FIRST.      Prior to reschedule please review:  Ordering Provider:     AMPARO MCKEON     Sedation Determined: MODERATE  Does patient have any ASC Exclusions, please identify?: N      Notes on Cancelled Procedure:  Procedure: Lower Endoscopy [Colonoscopy]   Date: 05/10/2024  Location: [facility]; [address] [place] [address], 2nd Floor, [place] [address]   Surgeon: SERENITY      Rescheduled: No,         Did you cancel or rescheduled an EUS procedure? No.

## 2024-05-07 ENCOUNTER — TRANSFERRED RECORDS (OUTPATIENT)
Dept: SURGERY | Facility: CLINIC | Age: 79
End: 2024-05-07
Payer: COMMERCIAL

## 2024-05-24 ENCOUNTER — HOSPITAL ENCOUNTER (OUTPATIENT)
Dept: MAMMOGRAPHY | Facility: CLINIC | Age: 79
Discharge: HOME | End: 2024-05-24
Attending: FAMILY MEDICINE | Admitting: FAMILY MEDICINE
Payer: COMMERCIAL

## 2024-05-24 DIAGNOSIS — Z12.31 VISIT FOR SCREENING MAMMOGRAM: ICD-10-CM

## 2024-05-24 PROCEDURE — 77063 BREAST TOMOSYNTHESIS BI: CPT

## 2024-06-04 ENCOUNTER — OFFICE VISIT (OUTPATIENT)
Dept: SURGERY | Facility: CLINIC | Age: 79
End: 2024-06-04
Payer: COMMERCIAL

## 2024-06-04 VITALS
BODY MASS INDEX: 24.62 KG/M2 | HEART RATE: 85 BPM | WEIGHT: 172 LBS | RESPIRATION RATE: 16 BRPM | DIASTOLIC BLOOD PRESSURE: 84 MMHG | OXYGEN SATURATION: 94 % | SYSTOLIC BLOOD PRESSURE: 132 MMHG | HEIGHT: 70 IN

## 2024-06-04 DIAGNOSIS — E21.3 HYPERPARATHYROIDISM (H): Primary | ICD-10-CM

## 2024-06-04 PROCEDURE — 99204 OFFICE O/P NEW MOD 45 MIN: CPT | Performed by: SURGERY

## 2024-06-04 NOTE — LETTER
June 4, 2024          Xiomara Sullivan MD  [facility] OF [address]  [place] [address]      RE:   Tonia Villavicencio 1945      Dear Colleague,    Thank you for referring your patient, Tonia Villavicencio, to [facility] at [place] location. Please see a copy of my visit note below.     Tonia Villavicencio is a 79 year old female who presented with hypercalcemia.  This was noted by her primary care office.  Calcium was 10.5 at that time and a PTH was ordered.  This was elevated in the low 80s.  Patient had a bone density scan around that time which showed osteopenia with a low fracture risk.  No history of nephrolithiasis or pathologic fracture.  She was managed conservatively for some time but was seen for follow-up recently.  Her calcium was elevated, in the 11.4 range.  Patient does have complaints of fatigue.  She is clinically euthyroid.  No history of surgery to the neck.  She is here to discuss her primary hyperparathyroidism.     PMH:  Tonia Villavicencio  has a past medical history of Concussion, Depression, Diabetes (H), Elevated blood sugar, Fall, Heart murmur, History of flexible sigmoidoscopy, Hyperlipidemia, Hypertension, Knee osteoarthritis, Leg injury, Low back pain, Menopausal state, Migraine, Nose bleeds, Obesity, unspecified, Poor dentition, Radius fracture, Serum calcium elevated, Vaginal delivery, Varicose vein, Visit for review of DEXA scan, Vitamin D deficiency, and Warts.  PSH:  Tonia Villavicencio  has a past surgical history that includes GYN surgery and Arthroscopy knee.     Home medications and allergies reviewed.     Social History:  Tonia Villavicencio  reports that she has never smoked. She has never used smokeless tobacco. She reports that she does not drink alcohol and does not use drugs.  Family History:  Tonia Villavicencio family history includes Diabetes in her brother; Heart Disease in her mother; Prostate Cancer in her father.     ROS:   The 10 point Review of Systems is negative other than noted in the HPI.  Patient complains of fatigue, some mild sleep disturbances.     Physical Exam:  not currently breastfeeding.  0 lbs 0 oz  Healthy appearing female in no distress.  Patient has a pleasant affect and communicates well.   Pupils equal round and reactive to light.   No cervical lymphadenopathy or thyromegaly.  Good neck range of motion.  Skin creases sufficient for thyroid surgery.  Lung fields clear, breathing comfortably.   Heart normal sinus rhythm.  No murmurs rubs or gallops.  Abdomen soft, nontender, nondistended.  Skin warm, dry.  No obvious rashes or lesions.     All new lab and imaging data was reviewed.  This includes outside clinic notes, lab reports, personal review of CT scan images.  High-resolution neck CT suggested a right superior nodule which could be a possible parathyroid adenoma.      Assessment/plan: Pleasant 79-year-old female presents with hypercalcemia.  No history of nephrolithiasis, pathologic fracture.  Most recent bone density suggested osteopenia.  Given her increasing calcium levels, currently over 11, I think she should seriously consider surgical correction of her hyperparathyroidism.  In my review of her CT scan, the lesion that is mentioned in the report on the right neck looks more consistent with a possible thyroid nodule.  There is another lesion in the left superior neck which looks suspicious for parathyroid.  I would likely start on the left side but I think there is a good chance she would require bilateral neck exploration with potential removal of dual adenomas.  Risks and benefits were explained including bleeding, infection, failure to cure, hypoparathyroidism, and injury to the recurrent laryngeal nerve.  Patient had a lot of suspicion and apprehension about surgery.  She recounted many instances in her life as well as her friends experiences where surgery was a poor choice.  I have explained the  surgical procedure and the relative risks and benefits.  Certainly surgery is not urgent or mandatory, but I think considering surgery at this point will help optimize bone health as she gets older.  She said she wanted to discuss again in the late summer and early fall, at which point she may consider scheduling surgery.  Surgical co-morbities include diabetes, obesity, hypertension, hyperlipidemia.       Again, thank you for allowing me to participate in the care of your patient.      Sincerely,      Jaime Colon MD

## 2024-08-27 ENCOUNTER — TRANSFERRED RECORDS (OUTPATIENT)
Dept: SURGERY | Facility: CLINIC | Age: 79
End: 2024-08-27
Payer: COMMERCIAL

## 2024-09-03 ENCOUNTER — APPOINTMENT (OUTPATIENT)
Dept: URBAN - METROPOLITAN AREA CLINIC 255 | Age: 79
Setting detail: DERMATOLOGY
End: 2024-09-04

## 2024-09-03 VITALS — WEIGHT: 170 LBS | HEIGHT: 69 IN

## 2024-09-03 DIAGNOSIS — Z71.89 OTHER SPECIFIED COUNSELING: ICD-10-CM

## 2024-09-03 DIAGNOSIS — T07XXXA INSECT BITE, NONVENOMOUS, OF OTHER, MULTIPLE, AND UNSPECIFIED SITES, WITHOUT MENTION OF INFECTION: ICD-10-CM

## 2024-09-03 DIAGNOSIS — D18.0 HEMANGIOMA: ICD-10-CM

## 2024-09-03 DIAGNOSIS — L82.1 OTHER SEBORRHEIC KERATOSIS: ICD-10-CM

## 2024-09-03 DIAGNOSIS — L81.4 OTHER MELANIN HYPERPIGMENTATION: ICD-10-CM

## 2024-09-03 DIAGNOSIS — D22 MELANOCYTIC NEVI: ICD-10-CM

## 2024-09-03 DIAGNOSIS — L57.0 ACTINIC KERATOSIS: ICD-10-CM

## 2024-09-03 DIAGNOSIS — L57.8 OTHER SKIN CHANGES DUE TO CHRONIC EXPOSURE TO NONIONIZING RADIATION: ICD-10-CM

## 2024-09-03 PROBLEM — S10.86XA INSECT BITE OF OTHER SPECIFIED PART OF NECK, INITIAL ENCOUNTER: Status: ACTIVE | Noted: 2024-09-03

## 2024-09-03 PROBLEM — D22.5 MELANOCYTIC NEVI OF TRUNK: Status: ACTIVE | Noted: 2024-09-03

## 2024-09-03 PROBLEM — D18.01 HEMANGIOMA OF SKIN AND SUBCUTANEOUS TISSUE: Status: ACTIVE | Noted: 2024-09-03

## 2024-09-03 PROCEDURE — OTHER LIQUID NITROGEN: OTHER

## 2024-09-03 PROCEDURE — OTHER MIPS QUALITY: OTHER

## 2024-09-03 PROCEDURE — OTHER COUNSELING: OTHER

## 2024-09-03 PROCEDURE — 17000 DESTRUCT PREMALG LESION: CPT

## 2024-09-03 PROCEDURE — 99203 OFFICE O/P NEW LOW 30 MIN: CPT | Mod: 25

## 2024-09-03 PROCEDURE — 17003 DESTRUCT PREMALG LES 2-14: CPT

## 2024-09-03 PROCEDURE — OTHER DEFER: OTHER

## 2024-09-03 ASSESSMENT — LOCATION DETAILED DESCRIPTION DERM
LOCATION DETAILED: LEFT ELBOW
LOCATION DETAILED: INFERIOR MID FOREHEAD
LOCATION DETAILED: LEFT ULNAR DORSAL HAND
LOCATION DETAILED: LEFT SUPERIOR MEDIAL UPPER BACK
LOCATION DETAILED: LEFT RADIAL DORSAL HAND
LOCATION DETAILED: LEFT MEDIAL UPPER BACK
LOCATION DETAILED: LEFT DISTAL DORSAL FOREARM
LOCATION DETAILED: LEFT VENTRAL PROXIMAL FOREARM
LOCATION DETAILED: MID POSTERIOR NECK

## 2024-09-03 ASSESSMENT — LOCATION SIMPLE DESCRIPTION DERM
LOCATION SIMPLE: POSTERIOR NECK
LOCATION SIMPLE: LEFT UPPER BACK
LOCATION SIMPLE: LEFT HAND
LOCATION SIMPLE: LEFT FOREARM
LOCATION SIMPLE: LEFT ELBOW
LOCATION SIMPLE: INFERIOR FOREHEAD

## 2024-09-03 ASSESSMENT — LOCATION ZONE DERM
LOCATION ZONE: FACE
LOCATION ZONE: NECK
LOCATION ZONE: HAND
LOCATION ZONE: ARM
LOCATION ZONE: TRUNK

## 2024-09-03 NOTE — PROCEDURE: LIQUID NITROGEN
Post-Care Instructions: - Patient was instructed to avoid picking at any of the treated lesions.
Render Post-Care Instructions In Note?: yes
Application Tool (Optional): Liquid Nitrogen Sprayer
Detail Level: Detailed
Consent: - Discussed that these are a result of cumulative sun exposure.\\n- Consent was obtained and risks were reviewed prior to procedure today. All questions were answered prior to procedure today.\\n- Risks discussed include but are not limited to pain, crusting, scabbing, blistering, scarring, temporary or permanent darker or lighter pigmentary change, recurrence, incomplete resolution, and infection.
Duration Of Freeze Thaw-Cycle (Seconds): 0
Show Aperture Variable?: No

## 2024-09-03 NOTE — PROCEDURE: DEFER
Procedure To Be Performed At Next Visit: Cryotherapy
Introduction Text (Please End With A Colon): The following procedure was deferred:
Scheduling Instructions (Optional): FU in 2 weeks
X Size Of Lesion In Cm (Optional): 0
Reason To Defer Override: AK treatment on face deferred due to upcoming event. She understands risk of delayed TX.
Detail Level: Detailed

## 2024-09-03 NOTE — HPI: FULL BODY SKIN EXAMINATION
How Severe Are Your Spot(S)?: mild
What Type Of Note Output Would You Prefer (Optional)?: Bullet Format
What Is The Reason For Today's Visit?: Full Body Skin Examination
What Is The Reason For Today's Visit? (Being Monitored For X): concerning skin lesions on an annual basis
Additional History: Pet hx of melanoma brother. Patient expresses general concern for her face and back.

## 2024-09-16 ENCOUNTER — TRANSCRIBE ORDERS (OUTPATIENT)
Dept: OTHER | Age: 79
End: 2024-09-16

## 2024-09-16 DIAGNOSIS — M17.9 OSTEOARTHRITIS OF KNEE: ICD-10-CM

## 2024-09-16 DIAGNOSIS — S76.311A RIGHT HAMSTRING MUSCLE STRAIN: Primary | ICD-10-CM

## 2024-09-19 ENCOUNTER — APPOINTMENT (OUTPATIENT)
Dept: URBAN - METROPOLITAN AREA CLINIC 255 | Age: 79
Setting detail: DERMATOLOGY
End: 2024-09-19

## 2024-09-19 VITALS — HEIGHT: 69 IN | WEIGHT: 170 LBS

## 2024-09-19 DIAGNOSIS — L57.0 ACTINIC KERATOSIS: ICD-10-CM

## 2024-09-19 PROCEDURE — OTHER MIPS QUALITY: OTHER

## 2024-09-19 PROCEDURE — 99213 OFFICE O/P EST LOW 20 MIN: CPT | Mod: 25

## 2024-09-19 PROCEDURE — OTHER SEPARATE AND IDENTIFIABLE DOCUMENTATION: OTHER

## 2024-09-19 PROCEDURE — 17003 DESTRUCT PREMALG LES 2-14: CPT

## 2024-09-19 PROCEDURE — OTHER LIQUID NITROGEN: OTHER

## 2024-09-19 PROCEDURE — 17000 DESTRUCT PREMALG LESION: CPT

## 2024-09-19 PROCEDURE — OTHER COUNSELING: OTHER

## 2024-09-19 ASSESSMENT — LOCATION SIMPLE DESCRIPTION DERM
LOCATION SIMPLE: RIGHT FOREHEAD
LOCATION SIMPLE: INFERIOR FOREHEAD
LOCATION SIMPLE: LEFT CHEEK

## 2024-09-19 ASSESSMENT — LOCATION DETAILED DESCRIPTION DERM
LOCATION DETAILED: LEFT LATERAL BUCCAL CHEEK
LOCATION DETAILED: LEFT SUPERIOR LATERAL BUCCAL CHEEK
LOCATION DETAILED: LEFT CENTRAL BUCCAL CHEEK
LOCATION DETAILED: LEFT INFERIOR LATERAL MALAR CHEEK
LOCATION DETAILED: INFERIOR MID FOREHEAD
LOCATION DETAILED: LEFT INFERIOR CENTRAL MALAR CHEEK
LOCATION DETAILED: RIGHT INFERIOR MEDIAL FOREHEAD

## 2024-09-19 ASSESSMENT — LOCATION ZONE DERM: LOCATION ZONE: FACE

## 2024-09-19 NOTE — PROCEDURE: COUNSELING
Detail Level: Detailed
Patient Specific Counseling (Will Not Stick From Patient To Patient): After care and expectations for healing were reviewed with patient and all questions were answered.

## 2024-09-19 NOTE — PROCEDURE: LIQUID NITROGEN
Render Note In Bullet Format When Appropriate: Yes
Consent: - Discussed that these are a result of cumulative sun exposure.\\n- Consent was obtained and risks were reviewed prior to procedure today. All questions were answered prior to procedure today.\\n- Risks discussed include but are not limited to pain, crusting, scabbing, blistering, scarring, temporary or permanent darker or lighter pigmentary change, recurrence, incomplete resolution, and infection.
Detail Level: Detailed
Application Tool (Optional): Liquid Nitrogen Sprayer
Duration Of Freeze Thaw-Cycle (Seconds): 0
Post-Care Instructions: - Patient was instructed to avoid picking at any of the treated lesions.
Show Aperture Variable?: No

## 2024-09-30 ENCOUNTER — PREP FOR PROCEDURE (OUTPATIENT)
Dept: SURGERY | Facility: CLINIC | Age: 79
End: 2024-09-30
Payer: COMMERCIAL

## 2024-09-30 DIAGNOSIS — E21.3 HYPERPARATHYROIDISM (H): Primary | ICD-10-CM

## 2024-10-03 ENCOUNTER — THERAPY VISIT (OUTPATIENT)
Dept: PHYSICAL THERAPY | Facility: CLINIC | Age: 79
End: 2024-10-03
Attending: FAMILY MEDICINE
Payer: COMMERCIAL

## 2024-10-03 ENCOUNTER — TELEPHONE (OUTPATIENT)
Dept: SURGERY | Facility: CLINIC | Age: 79
End: 2024-10-03

## 2024-10-03 DIAGNOSIS — S76.311A RIGHT HAMSTRING MUSCLE STRAIN: ICD-10-CM

## 2024-10-03 DIAGNOSIS — M79.18 RIGHT BUTTOCK PAIN: Primary | ICD-10-CM

## 2024-10-03 DIAGNOSIS — M17.9 OSTEOARTHRITIS OF KNEE: ICD-10-CM

## 2024-10-03 PROCEDURE — 97110 THERAPEUTIC EXERCISES: CPT | Mod: GP | Performed by: PHYSICAL THERAPIST

## 2024-10-03 PROCEDURE — 97161 PT EVAL LOW COMPLEX 20 MIN: CPT | Mod: GP | Performed by: PHYSICAL THERAPIST

## 2024-10-03 ASSESSMENT — ACTIVITIES OF DAILY LIVING (ADL)
WALKING_UP_STEEP_HILLS: MODERATE DIFFICULTY
WALKING_15_MINUTES_OR_GREATER: MODERATE DIFFICULTY
STANDING_FOR_15_MINUTES: SLIGHT DIFFICULTY
TWISTING/PIVOTING_ON_INVOLVED_LEG: EXTREME DIFFICULTY
DEEP_SQUATTING: MODERATE DIFFICULTY
GETTING_INTO_AND_OUT_OF_A_BATHTUB: NO DIFFICULTY AT ALL
HOS_ADL_HIGHEST_POTENTIAL_SCORE: 68
PUTTING_ON_SOCKS_AND_SHOES: NO DIFFICULTY AT ALL
GOING_UP_1_FLIGHT_OF_STAIRS: MODERATE DIFFICULTY
GETTING_INTO_AND_OUT_OF_AN_AVERAGE_CAR: SLIGHT DIFFICULTY
SITTING_FOR_15_MINUTES: NO DIFFICULTY AT ALL
HEAVY_WORK: EXTREME DIFFICULTY
HOS_ADL_ITEM_SCORE_TOTAL: 35
WALKING_DOWN_STEEP_HILLS: MODERATE DIFFICULTY
WALKING_APPROXIMATELY_10_MINUTES: MODERATE DIFFICULTY
GOING_DOWN_1_FLIGHT_OF_STAIRS: MODERATE DIFFICULTY
ROLLING_OVER_IN_BED: EXTREME DIFFICULTY
HOS_ADL_COUNT: 17
WALKING_INITIALLY: NO DIFFICULTY AT ALL
RECREATIONAL_ACTIVITIES: EXTREME DIFFICULTY
LIGHT_TO_MODERATE_WORK: EXTREME DIFFICULTY
HOW_WOULD_YOU_RATE_YOUR_CURRENT_LEVEL_OF_FUNCTION_DURING_YOUR_USUAL_ACTIVITIES_OF_DAILY_LIVING_FROM_0_TO_100_WITH_100_BEING_YOUR_LEVEL_OF_FUNCTION_PRIOR_TO_YOUR_HIP_PROBLEM_AND_0_BEING_THE_INABILITY_TO_PERFORM_ANY_OF_YOUR_USUAL_DAILY_ACTIVITIES?: 20
HOS_ADL_SCORE(%): 51.47
STEPPING_UP_AND_DOWN_CURBS: MODERATE DIFFICULTY

## 2024-10-03 NOTE — TELEPHONE ENCOUNTER
Type of surgery: left parathyroidectomy possible bilateral  Location of surgery: [facility]  Date and time of surgery: 12/10/24 12:30pm  Surgeon: Dr Colon  Pre-Op Appt Date: pt t schedule  Post-Op Appt Date: pt to schedule   Packet sent out: Yes  Pre-cert/Authorization completed:  Not Applicable  Date: 10/1/24

## 2024-10-03 NOTE — PROGRESS NOTES
PHYSICAL THERAPY EVALUATION  Type of Visit: Evaluation       Fall Risk Screen:  Fall screen completed by: PT  Have you fallen 2 or more times in the past year?: No  Have you fallen and had an injury in the past year?: No  Is patient a fall risk?: No    Subjective       Presenting condition or subjective complaint: Pt complains of pain in R buttock area that started on 8/20/2024 after lifting 42 lb marielena litter (lifted 3 of them and did a lot of twisting/lifting).  Pt recalls twisting a lot and didn't feel anything during the activity.  However next morning had difficulty getting out of bed.  Date of onset: 08/20/24    Relevant medical history: Diabetes (Type 2)   Past Medical History:   Diagnosis Date    Concussion     Depression     was on celexa    Diabetes (H)     Elevated blood sugar     Fall     hit head    Heart murmur     negative echo    History of flexible sigmoidoscopy     5-03    Hyperlipidemia     Hypertension     Knee osteoarthritis     injected x 3    Leg injury     Low back pain     post fall    Menopausal state     Migraine     excedrin overuse/ takes ultram     Nose bleeds     Obesity, unspecified     Poor dentition     Radius fracture     Serum calcium elevated     resolved     Vaginal delivery     x 2    Varicose vein     Visit for review of DEXA scan     11-02     Vitamin D deficiency     Que        Dates & types of surgery: R ovarian cyst removal 1986  R knee scope  Past Surgical History:   Procedure Laterality Date    ARTHROSCOPY KNEE      GYN SURGERY      ovarian cyst removal and ovary       Prior diagnostic imaging/testing results: -- (NA)     Prior therapy history for the same diagnosis, illness or injury: No      Prior Level of Function  Transfers:   Ambulation:   ADL:   IADL:     Living Environment  Social support: Alone   Type of home: House   Stairs to enter the home: Yes       Ramp:     Stairs inside the home:         Help at home: None  Equipment owned:       Employment: Yes    Hobbies/Interests: swimming/sports/always active    Patient goals for therapy: have no pain    Pain assessment:      Objective   HIP EVALUATION  PAIN: Pain Level at Rest: 0/10  Pain Level with Use: 10/10  Pain Location: R buttock  Pain Quality: Sharp  Pain Frequency: intermittent  Pain is Worst: getting up in the am, turning in bed at night if she turns wrong  Pain is Exacerbated By: bending/twisting, stairs, walking  Pain is Relieved By: tylenol, moving around to loosen it up.  It hurts but seems to help  Pain Progression: Unchanged  INTEGUMENTARY (edema, incisions): WNL  POSTURE:  genu valgus on R > L, lacks TKE R > L side  GAIT:   Weightbearing Status:   Assistive Device(s):   Gait Deviations:   BALANCE/PROPRIOCEPTION:  SLS on R more unstable, decreased hip control  WEIGHTBEARING ALIGNMENT:   NON-WEIGHTBEARING ALIGNMENT:    ROM:   (Degrees) Left AROM Left PROM  Right AROM Right PROM   Hip Flexion  WNL  WNL   Hip Extension  WNL  Mod loss   Hip Abduction  Mod loss  Mod loss   Hip Adduction       Hip Internal Rotation  WNL  WNL   Hip External Rotation  WNL  Min loss   Knee Flexion       Knee Extension  -5 degrees from extension  -5 degrees extension   Lumbar Side glide     Lumbar Flexion Min loss pain in R buttock   Lumbar Extension Max loss pain in R buttock, unable to tolerate prone lying as increased pain in R buttock   Pain:   End feel:     PELVIC/SI SCREEN:   STRENGTH:   Pain: - none + mild ++ moderate +++ severe  Strength Scale: 0-5/5 Left Right   Hip Flexion 4+ 4+   Hip Extension 4+ 4- with pain   Hip Abduction 4+ 4- with pain   Hip Adduction     Hip Internal Rotation     Hip External Rotation     Knee Flexion 4+ 4+   Knee Extension 4+ 4+     LE FLEXIBILITY: Decreased piriformis R, B hamstring WNL and did not reproduce pain  SPECIAL TESTS:  +slump test on R  FUNCTIONAL TESTS:   PALPATION:  tenderness to palpation R piriformis, no tenderness on PSIS or greater trochanter  JOINT MOBILITY: limited R hip  extension and IR    Assessment & Plan   CLINICAL IMPRESSIONS  Medical Diagnosis: R hamstring strain    Treatment Diagnosis: R buttock pain questioning if coming from Lumbar pathology   Impression/Assessment: Patient is a 79 year old female with R buttock pain complaints.  The following significant findings have been identified: Pain, Decreased ROM/flexibility, Decreased joint mobility, Decreased strength, Decreased activity tolerance, and Impaired posture. These impairments interfere with their ability to perform self care tasks, recreational activities, and community mobility as compared to previous level of function.     Clinical Decision Making (Complexity):  Clinical Presentation: Stable/Uncomplicated  Clinical Presentation Rationale: based on medical and personal factors listed in PT evaluation  Clinical Decision Making (Complexity): Low complexity    PLAN OF CARE  Treatment Interventions:  Interventions: Manual Therapy, Neuromuscular Re-education, Therapeutic Activity, Therapeutic Exercise, Self-Care/Home Management    Long Term Goals            Frequency of Treatment: 1 x a week  Duration of Treatment: 3 weeks tapering to 2 times a month x 2 months    Recommended Referrals to Other Professionals: Physical Therapy  Education Assessment:   Education Comments: ptrx handouts printed    Risks and benefits of evaluation/treatment have been explained.   Patient/Family/caregiver agrees with Plan of Care.     Evaluation Time:     PT Eval, Low Complexity Minutes (34185): 20       Signing Clinician: Allen Cifuentes PT        [facility]                                                                                   OUTPATIENT PHYSICAL THERAPY      PLAN OF TREATMENT FOR OUTPATIENT REHABILITATION   Patient's Last Name, First Name, Tonia Samayoa YOB: 1945   Provider's Name   [facility]   Medical Record No.  [id number]     Onset Date:  08/20/24  Start of Care Date: 10/03/24     Medical Diagnosis:  R hamstring strain      PT Treatment Diagnosis:  R buttock pain Plan of Treatment  Frequency/Duration: 1 x a week/ 3 weeks tapering to 2 times a month x 2 months    Certification date from 10/03/24 to 12/31/24         See note for plan of treatment details and functional goals     Allen Cifuentes, PT                         I CERTIFY THE NEED FOR THESE SERVICES FURNISHED UNDER        THIS PLAN OF TREATMENT AND WHILE UNDER MY CARE .             Physician Signature               Date    X_____________________________________________________                  Referring Provider:  Sasha Anglin    Initial Assessment  See Epic Evaluation- Start of Care Date: 10/03/24

## 2024-11-25 ENCOUNTER — TRANSFERRED RECORDS (OUTPATIENT)
Dept: HEALTH INFORMATION MANAGEMENT | Facility: CLINIC | Age: 79
End: 2024-11-25
Payer: COMMERCIAL

## 2024-12-13 ENCOUNTER — TRANSFERRED RECORDS (OUTPATIENT)
Dept: HEALTH INFORMATION MANAGEMENT | Facility: CLINIC | Age: 79
End: 2024-12-13

## 2024-12-13 LAB
CREATININE (EXTERNAL): 0.77 MG/DL (ref 0.52–1.04)
HBA1C MFR BLD: 5.9 %

## 2025-01-22 ENCOUNTER — TRANSFERRED RECORDS (OUTPATIENT)
Dept: HEALTH INFORMATION MANAGEMENT | Facility: CLINIC | Age: 80
End: 2025-01-22

## 2025-01-22 ENCOUNTER — MEDICAL CORRESPONDENCE (OUTPATIENT)
Dept: HEALTH INFORMATION MANAGEMENT | Facility: CLINIC | Age: 80
End: 2025-01-22

## 2025-06-11 ENCOUNTER — TRANSFERRED RECORDS (OUTPATIENT)
Dept: HEALTH INFORMATION MANAGEMENT | Facility: CLINIC | Age: 80
End: 2025-06-11
Payer: COMMERCIAL

## 2025-06-11 ENCOUNTER — MEDICAL CORRESPONDENCE (OUTPATIENT)
Dept: HEALTH INFORMATION MANAGEMENT | Facility: CLINIC | Age: 80
End: 2025-06-11
Payer: COMMERCIAL

## 2025-06-26 ENCOUNTER — TELEPHONE (OUTPATIENT)
Dept: SURGERY | Facility: CLINIC | Age: 80
End: 2025-06-26

## 2025-06-26 ENCOUNTER — OFFICE VISIT (OUTPATIENT)
Dept: SURGERY | Facility: CLINIC | Age: 80
End: 2025-06-26
Payer: COMMERCIAL

## 2025-06-26 VITALS
HEART RATE: 65 BPM | OXYGEN SATURATION: 98 % | BODY MASS INDEX: 24.34 KG/M2 | WEIGHT: 170 LBS | HEIGHT: 70 IN | RESPIRATION RATE: 16 BRPM | DIASTOLIC BLOOD PRESSURE: 82 MMHG | SYSTOLIC BLOOD PRESSURE: 130 MMHG

## 2025-06-26 DIAGNOSIS — E21.3 HYPERPARATHYROIDISM: Primary | ICD-10-CM

## 2025-06-26 NOTE — PROGRESS NOTES
Surgery  I am seeing Tonia in follow-up for her hyperparathyroidism.  She was on the schedule late last year but canceled.  This was due to a combination of hypertension and anxiety.  Since then, she has followed up with her endocrinologist who again recommended surgery.  Labs taken within the last couple of weeks show a calcium of 11.4 and a PTH of 70.  Her latest bone density scan showed osteopenia.  I discussed the surgery and its risks with Tonia and explained our goals for doing surgery.  She felt comfortable proceeding.  We will get her on the schedule for left neck exploration, possible bilateral, parathyroidectomy.  This should be done as an outpatient.  We will schedule at her convenience.  Donald Colon MD  General Surgery, Office [phone]

## 2025-06-26 NOTE — TELEPHONE ENCOUNTER
Type of surgery: left parathyroidectomy possible total  Location of surgery: [facility]  Date and time of surgery: 7/24/25 12:30pm  Surgeon: Dr Colon  Pre-Op Appt Date: pt to schedule  Post-Op Appt Date: pt to schedule   Packet sent out: Yes  Pre-cert/Authorization completed:  Not Applicable  Date: 6/26/25

## 2025-06-26 NOTE — LETTER
June 26, 2025          Xiomara Sullivan MD  [facility] OF [address]  [place] [address]      RE:   Tonia Villavicencio 1945      Dear Colleague,    Thank you for referring your patient, Tonia Villavicencio, to [facility]. Please see a copy of my visit note below.    I am seeing Tonia in follow-up for her hyperparathyroidism. She was on the schedule late last year but canceled. This was due to a combination of hypertension and anxiety. Since then, she has followed up with her endocrinologist who again recommended surgery. Labs taken within the last couple of weeks show a calcium of 11.4 and a PTH of 70. Her latest bone density scan showed osteopenia. I discussed the surgery and its risks with Tonia and explained our goals for doing surgery. She felt comfortable proceeding. We will get her on the schedule for left neck exploration, possible bilateral, parathyroidectomy. This should be done as an outpatient. We will schedule at her convenience.     Again, thank you for allowing me to participate in the care of your patient.      Sincerely,      Mathew Villareal MD

## 2025-07-11 ENCOUNTER — TRANSFERRED RECORDS (OUTPATIENT)
Dept: MULTI SPECIALTY CLINIC | Facility: CLINIC | Age: 80
End: 2025-07-11
Payer: COMMERCIAL

## 2025-07-11 LAB
CREATININE (EXTERNAL): 0.82 MG/DL (ref 0.6–0.95)
GFR ESTIMATED (EXTERNAL): 72 ML/MIN/1.73M2
GLUCOSE (EXTERNAL): 141 MG/DL (ref 65–99)
INR (EXTERNAL): 1 (ref 0.9–1.1)
POTASSIUM (EXTERNAL): 4.5 MMOL/L (ref 3.5–5.3)

## 2025-07-15 NOTE — OR NURSING
Spoke with pt today regarding GLP-1: Ozempic dosing prior to surgery scheduled on 7/24/25. Pt reports last dose was 7/5/25. Pt reports will not take again prior to surgery.

## 2025-07-22 ENCOUNTER — TELEPHONE (OUTPATIENT)
Dept: SURGERY | Facility: CLINIC | Age: 80
End: 2025-07-22
Payer: COMMERCIAL

## 2025-07-22 NOTE — TELEPHONE ENCOUNTER
Patient is scheduled [place] [facility] [place] some questions about recovery time and restrictions    Please call    Phone: [phone]   Message ok

## 2025-07-22 NOTE — TELEPHONE ENCOUNTER
Patient calling back reporting that her ride fell through.  Can she take an uber?    Informed her that she will need to stay overnight (outpatient in a bed). Okay to take an uber to surgery.  Also okay to take an uber home after discharge the next day, just not same day surgery due to anesthesia.    She verbalizes understanding of this. Will email her information on outpatient in a bed. Will have surgery scheduling change to outpatient in a bed vs same day.    Patient reports that she does have a friend that can pick her up the day after if need be.    All questions answered. She will call SARBJIT Arana RN-BSN

## 2025-07-22 NOTE — TELEPHONE ENCOUNTER
Planned Procedure: left parathyroidectomy, possible bilateral    Date: 7/24/25    Surgeon: Darlene    Patient wondering how long she has to be on restrictions, particularly referring to golfing and mowing the lawn?    Informed her that she will be on a 15 lb restriction for 1-2 weeks after surgery, depending on how she is feeling and recovering. Dr. Colon will call her with her pathology results once they are available and she should discuss how she is feeling with him during that phone call.  She will also have a post op 2-4 weeks after surgery, in clinic, with Dr Colon.    Discussed that she will be sent home with pain medication, calcium supplementation, and laxative/stool softener.    All questions answered. Patient was encouraged to call PRN    She agreed    Luiza Arana RN-BSN

## 2025-07-24 ENCOUNTER — HOSPITAL ENCOUNTER (OUTPATIENT)
Facility: CLINIC | Age: 80
Discharge: HOME | End: 2025-07-25
Attending: SURGERY | Admitting: SURGERY
Payer: COMMERCIAL

## 2025-07-24 ENCOUNTER — ANESTHESIA EVENT (OUTPATIENT)
Dept: SURGERY | Facility: CLINIC | Age: 80
End: 2025-07-24

## 2025-07-24 ENCOUNTER — ANESTHESIA (OUTPATIENT)
Dept: SURGERY | Facility: CLINIC | Age: 80
End: 2025-07-24

## 2025-07-24 DIAGNOSIS — G89.18 POSTOPERATIVE PAIN: Primary | ICD-10-CM

## 2025-07-24 DIAGNOSIS — E21.3 HYPERPARATHYROIDISM: ICD-10-CM

## 2025-07-24 DIAGNOSIS — Z98.890 S/P PARATHYROIDECTOMY: ICD-10-CM

## 2025-07-24 DIAGNOSIS — Z90.89 S/P PARATHYROIDECTOMY: ICD-10-CM

## 2025-07-24 LAB
PTH-INTACT SERPL-MCNC: 109 PG/ML (ref 15–65)
PTH-INTACT SERPL-MCNC: 46 PG/ML (ref 15–65)
PTH-INTACT SERPL-MCNC: 59 PG/ML (ref 15–65)

## 2025-07-24 PROCEDURE — 999N000141 HC STATISTIC PRE-PROCEDURE NURSING ASSESSMENT: Performed by: SURGERY

## 2025-07-24 PROCEDURE — 60500 EXPLORE PARATHYROID GLANDS: CPT | Mod: AS | Performed by: PHYSICIAN ASSISTANT

## 2025-07-24 PROCEDURE — 360N000077 HC SURGERY LEVEL 4, PER MIN: Performed by: SURGERY

## 2025-07-24 PROCEDURE — 250N000013 HC RX MED GY IP 250 OP 250 PS 637: Performed by: PHYSICIAN ASSISTANT

## 2025-07-24 PROCEDURE — 710N000009 HC RECOVERY PHASE 1, LEVEL 1, PER MIN: Performed by: SURGERY

## 2025-07-24 PROCEDURE — 250N000025 HC SEVOFLURANE, PER MIN: Performed by: SURGERY

## 2025-07-24 PROCEDURE — 250N000013 HC RX MED GY IP 250 OP 250 PS 637: Performed by: SURGERY

## 2025-07-24 PROCEDURE — 272N000001 HC OR GENERAL SUPPLY STERILE: Performed by: SURGERY

## 2025-07-24 PROCEDURE — 36415 COLL VENOUS BLD VENIPUNCTURE: CPT | Performed by: SURGERY

## 2025-07-24 PROCEDURE — 60500 EXPLORE PARATHYROID GLANDS: CPT | Performed by: SURGERY

## 2025-07-24 PROCEDURE — 370N000017 HC ANESTHESIA TECHNICAL FEE, PER MIN: Performed by: SURGERY

## 2025-07-24 PROCEDURE — 250N000009 HC RX 250: Performed by: SURGERY

## 2025-07-24 RX ORDER — HYDROMORPHONE HCL IN WATER/PF 6 MG/30 ML
0.1 PATIENT CONTROLLED ANALGESIA SYRINGE INTRAVENOUS EVERY 4 HOURS PRN
Status: DISCONTINUED | OUTPATIENT
Start: 2025-07-24 | End: 2025-07-25 | Stop reason: HOSPADM

## 2025-07-24 RX ORDER — NALOXONE HYDROCHLORIDE 0.4 MG/ML
0.2 INJECTION, SOLUTION INTRAMUSCULAR; INTRAVENOUS; SUBCUTANEOUS
Status: DISCONTINUED | OUTPATIENT
Start: 2025-07-24 | End: 2025-07-25 | Stop reason: HOSPADM

## 2025-07-24 RX ORDER — NALOXONE HYDROCHLORIDE 0.4 MG/ML
0.1 INJECTION, SOLUTION INTRAMUSCULAR; INTRAVENOUS; SUBCUTANEOUS
Status: DISCONTINUED | OUTPATIENT
Start: 2025-07-24 | End: 2025-07-24 | Stop reason: HOSPADM

## 2025-07-24 RX ORDER — DEXAMETHASONE SODIUM PHOSPHATE 4 MG/ML
INJECTION, SOLUTION INTRA-ARTICULAR; INTRALESIONAL; INTRAMUSCULAR; INTRAVENOUS; SOFT TISSUE PRN
Status: DISCONTINUED | OUTPATIENT
Start: 2025-07-24 | End: 2025-07-24

## 2025-07-24 RX ORDER — NICOTINE POLACRILEX 4 MG
15-30 LOZENGE BUCCAL
Status: DISCONTINUED | OUTPATIENT
Start: 2025-07-24 | End: 2025-07-25 | Stop reason: HOSPADM

## 2025-07-24 RX ORDER — FENTANYL CITRATE 0.05 MG/ML
50 INJECTION, SOLUTION INTRAMUSCULAR; INTRAVENOUS EVERY 5 MIN PRN
Status: DISCONTINUED | OUTPATIENT
Start: 2025-07-24 | End: 2025-07-24 | Stop reason: HOSPADM

## 2025-07-24 RX ORDER — FAMOTIDINE 20 MG/1
20 TABLET, FILM COATED ORAL 2 TIMES DAILY
Status: COMPLETED | OUTPATIENT
Start: 2025-07-24 | End: 2025-07-25

## 2025-07-24 RX ORDER — SODIUM CHLORIDE, SODIUM LACTATE, POTASSIUM CHLORIDE, CALCIUM CHLORIDE 600; 310; 30; 20 MG/100ML; MG/100ML; MG/100ML; MG/100ML
INJECTION, SOLUTION INTRAVENOUS CONTINUOUS
Status: DISCONTINUED | OUTPATIENT
Start: 2025-07-24 | End: 2025-07-24 | Stop reason: HOSPADM

## 2025-07-24 RX ORDER — HYDROCODONE BITARTRATE AND ACETAMINOPHEN 5; 325 MG/1; MG/1
1 TABLET ORAL EVERY 4 HOURS PRN
Qty: 6 TABLET | Refills: 0 | Status: SHIPPED | OUTPATIENT
Start: 2025-07-24

## 2025-07-24 RX ORDER — OXYCODONE HYDROCHLORIDE 5 MG/1
5 TABLET ORAL EVERY 4 HOURS PRN
Status: DISCONTINUED | OUTPATIENT
Start: 2025-07-24 | End: 2025-07-25 | Stop reason: HOSPADM

## 2025-07-24 RX ORDER — NALOXONE HYDROCHLORIDE 0.4 MG/ML
0.4 INJECTION, SOLUTION INTRAMUSCULAR; INTRAVENOUS; SUBCUTANEOUS
Status: DISCONTINUED | OUTPATIENT
Start: 2025-07-24 | End: 2025-07-25 | Stop reason: HOSPADM

## 2025-07-24 RX ORDER — AMLODIPINE BESYLATE 5 MG/1
5 TABLET ORAL 2 TIMES DAILY
Status: DISCONTINUED | OUTPATIENT
Start: 2025-07-24 | End: 2025-07-25 | Stop reason: HOSPADM

## 2025-07-24 RX ORDER — METOPROLOL TARTRATE 50 MG
50 TABLET ORAL 2 TIMES DAILY
Status: DISCONTINUED | OUTPATIENT
Start: 2025-07-24 | End: 2025-07-25 | Stop reason: HOSPADM

## 2025-07-24 RX ORDER — FENTANYL CITRATE 50 UG/ML
INJECTION, SOLUTION INTRAMUSCULAR; INTRAVENOUS PRN
Status: DISCONTINUED | OUTPATIENT
Start: 2025-07-24 | End: 2025-07-24

## 2025-07-24 RX ORDER — ONDANSETRON 2 MG/ML
INJECTION INTRAMUSCULAR; INTRAVENOUS PRN
Status: DISCONTINUED | OUTPATIENT
Start: 2025-07-24 | End: 2025-07-24

## 2025-07-24 RX ORDER — VITAMIN B COMPLEX
25 TABLET ORAL DAILY
Status: DISCONTINUED | OUTPATIENT
Start: 2025-07-25 | End: 2025-07-25 | Stop reason: HOSPADM

## 2025-07-24 RX ORDER — BUPIVACAINE HCL/EPINEPHRINE 0.25-.0005
VIAL (ML) INJECTION PRN
Status: DISCONTINUED | OUTPATIENT
Start: 2025-07-24 | End: 2025-07-24 | Stop reason: HOSPADM

## 2025-07-24 RX ORDER — AMOXICILLIN 250 MG
1 CAPSULE ORAL AT BEDTIME
Status: DISCONTINUED | OUTPATIENT
Start: 2025-07-24 | End: 2025-07-25 | Stop reason: HOSPADM

## 2025-07-24 RX ORDER — BUPIVACAINE HYDROCHLORIDE AND EPINEPHRINE 2.5; 5 MG/ML; UG/ML
INJECTION, SOLUTION EPIDURAL; INFILTRATION; INTRACAUDAL; PERINEURAL
Status: DISCONTINUED
Start: 2025-07-24 | End: 2025-07-24 | Stop reason: HOSPADM

## 2025-07-24 RX ORDER — IRBESARTAN 150 MG/1
150 TABLET ORAL 2 TIMES DAILY
Status: DISCONTINUED | OUTPATIENT
Start: 2025-07-25 | End: 2025-07-25 | Stop reason: HOSPADM

## 2025-07-24 RX ORDER — HYDROMORPHONE HCL IN WATER/PF 6 MG/30 ML
0.4 PATIENT CONTROLLED ANALGESIA SYRINGE INTRAVENOUS EVERY 5 MIN PRN
Status: DISCONTINUED | OUTPATIENT
Start: 2025-07-24 | End: 2025-07-24 | Stop reason: HOSPADM

## 2025-07-24 RX ORDER — AMLODIPINE BESYLATE 5 MG/1
5 TABLET ORAL 2 TIMES DAILY
COMMUNITY

## 2025-07-24 RX ORDER — SEMAGLUTIDE 1.34 MG/ML
1 INJECTION, SOLUTION SUBCUTANEOUS
COMMUNITY

## 2025-07-24 RX ORDER — ACETAMINOPHEN 325 MG/1
975 TABLET ORAL EVERY 6 HOURS PRN
Status: DISCONTINUED | OUTPATIENT
Start: 2025-07-24 | End: 2025-07-25 | Stop reason: HOSPADM

## 2025-07-24 RX ORDER — SODIUM CHLORIDE, SODIUM LACTATE, POTASSIUM CHLORIDE, CALCIUM CHLORIDE 600; 310; 30; 20 MG/100ML; MG/100ML; MG/100ML; MG/100ML
INJECTION, SOLUTION INTRAVENOUS CONTINUOUS PRN
Status: DISCONTINUED | OUTPATIENT
Start: 2025-07-24 | End: 2025-07-24

## 2025-07-24 RX ORDER — FENTANYL CITRATE 0.05 MG/ML
25 INJECTION, SOLUTION INTRAMUSCULAR; INTRAVENOUS EVERY 5 MIN PRN
Status: DISCONTINUED | OUTPATIENT
Start: 2025-07-24 | End: 2025-07-24 | Stop reason: HOSPADM

## 2025-07-24 RX ORDER — HYDROMORPHONE HCL IN WATER/PF 6 MG/30 ML
0.2 PATIENT CONTROLLED ANALGESIA SYRINGE INTRAVENOUS EVERY 5 MIN PRN
Status: DISCONTINUED | OUTPATIENT
Start: 2025-07-24 | End: 2025-07-24 | Stop reason: HOSPADM

## 2025-07-24 RX ORDER — ROSUVASTATIN CALCIUM 20 MG/1
40 TABLET, COATED ORAL DAILY
Status: DISCONTINUED | OUTPATIENT
Start: 2025-07-25 | End: 2025-07-25 | Stop reason: HOSPADM

## 2025-07-24 RX ORDER — DEXTROSE MONOHYDRATE 25 G/50ML
25-50 INJECTION, SOLUTION INTRAVENOUS
Status: DISCONTINUED | OUTPATIENT
Start: 2025-07-24 | End: 2025-07-25 | Stop reason: HOSPADM

## 2025-07-24 RX ORDER — ONDANSETRON 4 MG/1
4 TABLET, ORALLY DISINTEGRATING ORAL EVERY 30 MIN PRN
Status: DISCONTINUED | OUTPATIENT
Start: 2025-07-24 | End: 2025-07-24 | Stop reason: HOSPADM

## 2025-07-24 RX ORDER — MAGNESIUM HYDROXIDE 1200 MG/15ML
LIQUID ORAL PRN
Status: DISCONTINUED | OUTPATIENT
Start: 2025-07-24 | End: 2025-07-24 | Stop reason: HOSPADM

## 2025-07-24 RX ORDER — DEXAMETHASONE SODIUM PHOSPHATE 4 MG/ML
4 INJECTION, SOLUTION INTRA-ARTICULAR; INTRALESIONAL; INTRAMUSCULAR; INTRAVENOUS; SOFT TISSUE
Status: DISCONTINUED | OUTPATIENT
Start: 2025-07-24 | End: 2025-07-24 | Stop reason: HOSPADM

## 2025-07-24 RX ORDER — HYDROCODONE BITARTRATE AND ACETAMINOPHEN 5; 325 MG/1; MG/1
1 TABLET ORAL
Status: DISCONTINUED | OUTPATIENT
Start: 2025-07-24 | End: 2025-07-24

## 2025-07-24 RX ORDER — IRBESARTAN 150 MG/1
150 TABLET ORAL 2 TIMES DAILY
COMMUNITY

## 2025-07-24 RX ORDER — CALCIUM CARBONATE 500(1250)
1000 TABLET ORAL 2 TIMES DAILY WITH MEALS
Status: DISCONTINUED | OUTPATIENT
Start: 2025-07-24 | End: 2025-07-25

## 2025-07-24 RX ORDER — GLIMEPIRIDE 2 MG/1
2 TABLET ORAL
Status: DISCONTINUED | OUTPATIENT
Start: 2025-07-25 | End: 2025-07-25 | Stop reason: HOSPADM

## 2025-07-24 RX ORDER — ONDANSETRON 2 MG/ML
4 INJECTION INTRAMUSCULAR; INTRAVENOUS EVERY 30 MIN PRN
Status: DISCONTINUED | OUTPATIENT
Start: 2025-07-24 | End: 2025-07-24 | Stop reason: HOSPADM

## 2025-07-24 RX ORDER — METFORMIN HYDROCHLORIDE 500 MG/1
500 TABLET, EXTENDED RELEASE ORAL 2 TIMES DAILY WITH MEALS
Status: DISCONTINUED | OUTPATIENT
Start: 2025-07-24 | End: 2025-07-25 | Stop reason: HOSPADM

## 2025-07-24 RX ORDER — LIDOCAINE HYDROCHLORIDE 20 MG/ML
INJECTION, SOLUTION INFILTRATION; PERINEURAL PRN
Status: DISCONTINUED | OUTPATIENT
Start: 2025-07-24 | End: 2025-07-24

## 2025-07-24 RX ORDER — PROPOFOL 10 MG/ML
INJECTION, EMULSION INTRAVENOUS PRN
Status: DISCONTINUED | OUTPATIENT
Start: 2025-07-24 | End: 2025-07-24

## 2025-07-24 RX ORDER — AMOXICILLIN 250 MG
1-2 CAPSULE ORAL 2 TIMES DAILY PRN
Qty: 15 TABLET | Refills: 0 | Status: SHIPPED | OUTPATIENT
Start: 2025-07-24

## 2025-07-24 RX ORDER — CEFAZOLIN SODIUM/WATER 2 G/20 ML
2 SYRINGE (ML) INTRAVENOUS SEE ADMIN INSTRUCTIONS
Status: DISCONTINUED | OUTPATIENT
Start: 2025-07-24 | End: 2025-07-24 | Stop reason: HOSPADM

## 2025-07-24 RX ORDER — HYDROMORPHONE HCL IN WATER/PF 6 MG/30 ML
0.2 PATIENT CONTROLLED ANALGESIA SYRINGE INTRAVENOUS EVERY 4 HOURS PRN
Status: DISCONTINUED | OUTPATIENT
Start: 2025-07-24 | End: 2025-07-25 | Stop reason: HOSPADM

## 2025-07-24 RX ORDER — ROSUVASTATIN CALCIUM 40 MG/1
40 TABLET, COATED ORAL DAILY
COMMUNITY

## 2025-07-24 RX ORDER — LIDOCAINE 40 MG/G
CREAM TOPICAL
Status: DISCONTINUED | OUTPATIENT
Start: 2025-07-24 | End: 2025-07-25 | Stop reason: HOSPADM

## 2025-07-24 RX ORDER — CEFAZOLIN SODIUM/WATER 2 G/20 ML
2 SYRINGE (ML) INTRAVENOUS
Status: COMPLETED | OUTPATIENT
Start: 2025-07-24 | End: 2025-07-24

## 2025-07-24 RX ADMIN — Medication 0.5 MCG/KG/MIN: at 12:53

## 2025-07-24 RX ADMIN — PROPOFOL 200 MG: 10 INJECTION, EMULSION INTRAVENOUS at 12:50

## 2025-07-24 RX ADMIN — LIDOCAINE HYDROCHLORIDE 80 MG: 20 INJECTION, SOLUTION INFILTRATION; PERINEURAL at 12:50

## 2025-07-24 RX ADMIN — DEXAMETHASONE SODIUM PHOSPHATE 4 MG: 4 INJECTION, SOLUTION INTRA-ARTICULAR; INTRALESIONAL; INTRAMUSCULAR; INTRAVENOUS; SOFT TISSUE at 13:06

## 2025-07-24 RX ADMIN — Medication 1 TABLET: at 18:20

## 2025-07-24 RX ADMIN — SODIUM CHLORIDE, SODIUM LACTATE, POTASSIUM CHLORIDE, CALCIUM CHLORIDE: 600; 310; 30; 20 INJECTION, SOLUTION INTRAVENOUS at 12:44

## 2025-07-24 RX ADMIN — Medication 50 MG: at 20:12

## 2025-07-24 RX ADMIN — FENTANYL CITRATE 50 MCG: 50 INJECTION, SOLUTION INTRAMUSCULAR; INTRAVENOUS at 12:50

## 2025-07-24 RX ADMIN — Medication 100 MCG: at 13:02

## 2025-07-24 RX ADMIN — FAMOTIDINE 20 MG: 20 TABLET, FILM COATED ORAL at 20:12

## 2025-07-24 RX ADMIN — PROPOFOL 100 MCG/KG/MIN: 10 INJECTION, EMULSION INTRAVENOUS at 12:53

## 2025-07-24 RX ADMIN — OXYCODONE HYDROCHLORIDE 5 MG: 5 TABLET ORAL at 22:57

## 2025-07-24 RX ADMIN — Medication 2 G: at 12:54

## 2025-07-24 RX ADMIN — OXYCODONE HYDROCHLORIDE 5 MG: 5 TABLET ORAL at 18:20

## 2025-07-24 RX ADMIN — METFORMIN HYDROCHLORIDE 500 MG: 500 TABLET, EXTENDED RELEASE ORAL at 18:20

## 2025-07-24 RX ADMIN — AMLODIPINE BESYLATE 5 MG: 5 TABLET ORAL at 20:12

## 2025-07-24 RX ADMIN — ONDANSETRON 4 MG: 2 INJECTION INTRAMUSCULAR; INTRAVENOUS at 14:13

## 2025-07-24 ASSESSMENT — ACTIVITIES OF DAILY LIVING (ADL)
ADLS_ACUITY_SCORE: 16
ADLS_ACUITY_SCORE: 15
ADLS_ACUITY_SCORE: 16
ADLS_ACUITY_SCORE: 16
ADLS_ACUITY_SCORE: 41
ADLS_ACUITY_SCORE: 15
ADLS_ACUITY_SCORE: 16

## 2025-07-24 NOTE — PROGRESS NOTES
DATE & SHIFT: 7/24  PRIMARY Concern: parathyroidectomy, monitor overnight postop   SAFETY RISK Concerns (fall risk, behaviors, etc.): none      Aggression Tool Color: green  Isolation/Type: none  Tests/Procedures for NEXT shift: none  Consults? (Pending/following, signed-off?) surgery, no hospitalist   Where is patient from? (Home, TCU, etc.): home  Other Important info for NEXT shift:   Anticipated DC date & active delays: tomorrow, 7/25 likely   _____________________________________________________________________________  SUMMARY NOTE:   Orientation/Cognitive: A/O x4   Observation Goals (Met/ Not Met): in progress   Mobility Level/Assist Equipment: SBA/IND  Antibiotics & Plan (IV/po, length of tx left): none  Pain Management: Prn tylenol, oxy   Complete Pain Reassessment: Y/N yes Due next: next shift   Tele/VS/O2: vitally stable on RA ex HTN   ABNL Lab/BG: see results   Diet: regular  Bowel/Bladder: cont. Voiding well post op   Skin Concerns: incision to medial neck. Small amount of dried blood drainage to gauze  Drains/Devices:PIV  Patient Stated Goal for Today: rest; pain control

## 2025-07-24 NOTE — ANESTHESIA PREPROCEDURE EVALUATION
Anesthesia Pre-Procedure Evaluation    Patient: Tonia Villavicencio   MRN: [id number] : 1945          Procedure : Procedure(s):  PARATHYROIDECTOMY, left, possible bilateral         Past Medical History:   Diagnosis Date    Closed fracture of tuft of distal phalanx of finger, left 4th     Concussion     Depression     was on celexa    Elevated blood sugar     Essential hypertension     Fall     hit head    Heart murmur     negative echo    History of flexible sigmoidoscopy     5-03    Hyperlipidemia     Hyperparathyroidism     Knee osteoarthritis     injected x 3    Leg injury     Low back pain     post fall    Menopausal state     Migraine     excedrin overuse/ takes ultram     Nose bleeds     Obesity, unspecified     Poor dentition     Radius fracture     Serum calcium elevated     resolved     Type 2 diabetes mellitus (H)     Vaginal delivery     x 2    Varicose vein     Visit for review of DEXA scan          Vitamin B12 deficiency     Vitamin D deficiency     Warts       Past Surgical History:   Procedure Laterality Date    ARTHROSCOPY KNEE Right     OVARIAN CYST REMOVAL        Allergies   Allergen Reactions    Lisinopril Cough      Social History     Tobacco Use    Smoking status: Never    Smokeless tobacco: Never   Substance Use Topics    Alcohol use: No     Alcohol/week: 0.0 standard drinks of alcohol      Wt Readings from Last 1 Encounters:   25 77.1 kg (170 lb)        Anesthesia Evaluation   Pt has had prior anesthetic.     No history of anesthetic complications       ROS/MED HX  ENT/Pulmonary:    (-) sleep apnea   Neurologic:  - neg neurologic ROS     Cardiovascular:     (+) Dyslipidemia hypertension- -   -  - -                                      METS/Exercise Tolerance:     Hematologic:       Musculoskeletal:       GI/Hepatic:     (+) GERD, Asymptomatic on medication,                  Renal/Genitourinary: Comment: hyperparathyroidism    (+) renal disease, type: CRI,            Endo:    "  (+)  type II DM,                    Psychiatric/Substance Use:       Infectious Disease:       Malignancy:       Other:              Physical Exam  Airway  Mallampati: II  TM distance: >3 FB  Neck ROM: full  Mouth opening: >= 4 cm    Cardiovascular - normal exam   Dental   (+) Minor Abnormalities - some fillings, tiny chips      Pulmonary - normal exam      Neurological - normal exam  She appears awake, alert and oriented x3.    Other Findings       OUTSIDE LABS:  CBC:   Lab Results   Component Value Date    WBC 5.3 12/03/2019    WBC 6.0 04/30/2018    HGB 14.6 12/03/2019    HGB 14.4 04/30/2018    HCT 43.1 12/03/2019    HCT 45.5 04/30/2018     12/03/2019     04/30/2018     BMP:   Lab Results   Component Value Date     03/06/2020     (L) 12/03/2019    POTASSIUM 4.1 03/06/2020    POTASSIUM 4.2 12/03/2019    CHLORIDE 106 03/06/2020    CHLORIDE 90 (L) 12/03/2019    CO2 29 03/06/2020    CO2 26 12/03/2019    BUN 23 03/06/2020    BUN 37 (H) 12/03/2019    CR 1.0 05/03/2024    CR 0.95 03/06/2020     (H) 03/06/2020     (HH) 12/03/2019     COAGS:   Lab Results   Component Value Date    INR 1.0 07/11/2025     POC: No results found for: \"BGM\", \"HCG\", \"HCGS\"  HEPATIC:   Lab Results   Component Value Date    ALBUMIN 3.6 12/03/2019    PROTTOTAL 7.3 12/03/2019    ALT 37 12/03/2019    AST 19 12/03/2019    ALKPHOS 90 12/03/2019    BILITOTAL 0.7 12/03/2019     OTHER:   Lab Results   Component Value Date    A1C 6.9 (H) 03/06/2020    WENDY 9.7 03/06/2020    TSH 2.00 06/27/2019       Anesthesia Plan    ASA Status:  3      NPO Status: NPO Appropriate   Anesthesia Type: General.  Airway: oral.  Induction: intravenous.  Maintenance: Balanced, TIVA.   Techniques and Equipment:     - Airway:  Planned airway equipment includes video laryngoscope.     - Monitoring Plan: standard ASA monitoring     Consents    Anesthesia Plan(s) and associated risks, benefits, and realistic alternatives discussed. Questions " answered and patient/representative(s) expressed understanding.     - Discussed:     - Discussed with:  Patient               Postoperative Care    Pain management: multimodal analgesia.     Comments:                   Arsenio Capellan MD    I have reviewed the pertinent notes and labs in the chart from the past 30 days and (re)examined the patient.  Any updates or changes from those notes are reflected in this note.    Clinically Significant Risk Factors Present on Admission                 # Drug Induced Platelet Defect: home medication list includes an antiplatelet medication   # Hypertension: Noted on problem list

## 2025-07-24 NOTE — DISCHARGE INSTRUCTIONS
[facility]  Discharge Instructions: Post-Operative Parathyroid Surgery    ACTIVITY  Take frequent, short walks and increase your activity gradually.    Avoid strenuous physical activity or heavy lifting greater than 15-20 lbs. for 1-2 weeks.  You may climb stairs.  You may drive without restrictions when you are not using any prescription pain medication and feel comfortable in a car.  You may return to work/school when you are comfortable without any prescription pain medication.    WOUND CARE  You may remove your bandage and shower 48 hours after the surgery.  Pat your incision dry and leave it open to air.  Re-apply dressing (Band-Aids or gauze/tape) as needed for comfort or drainage.  You may have steri-strips (looks like white tape) on your incision.  You may peel off the steri-strips 2 weeks after your surgery if they have not peeled off on their own.  If you have skin glue, it will peel up and fall off on its own.  Do not soak your incision in a tub or pool for 2 weeks.   Do not apply any lotions, creams, or ointments to your incision.  A ridge under your incision is normal and will gradually resolve.    DIET  Start with liquids, then gradually resume your regular diet as tolerated.    Drink plenty of fluids to stay hydrated.    PAIN  Expect some tenderness and discomfort at the incision site(s).  Use the prescribed pain medication at your discretion.  Expect gradual resolution of your pain over several days.  You may take ibuprofen with food (unless you have been told not to) or acetaminophen/Tylenol instead of or in addition to your prescribed pain medication.  However, if you are taking Norco, do not take any additional acetaminophen/Tylenol.  Do not drink alcohol or drive while you are taking pain medications.  You may apply ice to your incisions in 20 minute intervals as needed for the next 48 hours.  After that time, consider switching to heat if you  prefer.    EXPECTATIONS  Watch for symptoms of numbness or tingling around the mouth or in the fingers or toes.  This may be a sign of a low calcium level.  This can be treated with taking extra calcium found in Tums.  Take 2-3 Tums right away and contact our office so we can evaluate your symptoms.  You may need to have your blood calcium level checked by doing a simple blood test.  Bruising may occur in middle of your chest or at the tops of your feet.  This is due to lead placement for the nerve monitor and blood draws respectively.  If bruising occurs, it will resolve with time.    Pain medications can cause constipation.  Limit use when possible.  Take an over the counter or prescribed stool softener/stimulant, such as Colace or Senna, 1-2 times a day with plenty of water.  You may take a mild over the counter laxative, such as Miralax or a suppository, as needed.    You may discontinue these medications once you are having regular bowel movements and/or are no longer taking your narcotic pain medication.    RETURN APPOINTMENT  Follow up with your surgeon in 2-3 weeks.  Please call our office at [phone] to schedule your appointment.  We are located at [address] [place] [address].    CALL OUR OFFICE [phone] IF YOU HAVE:   Chills or fever above 101 F.  Increased redness, warmth, or drainage at your incisions.  Significant bleeding.  Pain not relieved by your pain medication or rest.  Increasing pain after the first 48 hours.  Any other concerns or questions.             Revised January 2023

## 2025-07-24 NOTE — BRIEF OP NOTE
[facility]    Brief Operative Note    Pre-operative diagnosis: Hyperparathyroidism [E21.3]  Post-operative diagnosis Same as pre-operative diagnosis    Procedure: PARATHYROIDECOMY, Right - Neck  Explore neck, Bilateral - Neck    Surgeon: Surgeons and Role:     * Jaime Colon MD - Primary     * Suyapa Stanford PA-C - Assisting  Anesthesia: General   Estimated Blood Loss: Minimal, see Op Note  Drains: None  Specimens:   ID Type Source Tests Collected by Time Destination   1 : RIGHT INFERIOR NECK NODULE Tissue Neck, Right SURGICAL PATHOLOGY EXAM Jaime Colon MD 7/24/2025  1:29 PM    A : PARATHYROIDECTOMY Blood Blood, Venous PARATHYROID HORMONE INTACT Jaime Colon MD 7/24/2025  1:49 PM    B : PARATHYROIDECTOMY Blood Blood, Venous PARATHYROID HORMONE INTACT Jaime Colon MD 7/24/2025  1:59 PM      Findings:   Right parathyroidectomy, hypercellular on intra-op pathology. PTH dropped 109->59->46.  Complications: None.  Implants: * No implants in log *

## 2025-07-24 NOTE — ANESTHESIA CARE TRANSFER NOTE
Patient: Tonia Villavicencio    Procedure: Procedure(s):  PARATHYROIDECOMY  Explore neck       Diagnosis: Hyperparathyroidism [E21.3]  Diagnosis Additional Information: No value filed.    Anesthesia Type:   General     Note:    Oropharynx: oropharynx clear of all foreign objects  Level of Consciousness: awake  Oxygen Supplementation: face mask    Independent Airway: airway patency satisfactory and stable        Patient transferred to: PACU    Handoff Report: Identifed the Patient, Identified the Reponsible Provider, Reviewed the pertinent medical history, Discussed the surgical course, Reviewed Intra-OP anesthesia mangement and issues during anesthesia, Set expectations for post-procedure period and Allowed opportunity for questions and acknowledgement of understanding      Vitals:  Vitals Value Taken Time   /79 07/24/25 15:45   Temp     Pulse 74 07/24/25 15:55   Resp 20 07/24/25 15:55   SpO2 93 % 07/24/25 15:55   Vitals shown include unfiled device data.    Electronically Signed By: Arsenio Capellan MD  July 24, 2025  3:55 PM

## 2025-07-24 NOTE — ANESTHESIA POSTPROCEDURE EVALUATION
Patient: Tonia Villavicencio    Procedure: Procedure(s):  PARATHYROIDECOMY  Explore neck       Anesthesia Type:  General    Note:     Postop Pain Control: Uneventful            Sign Out: Well controlled pain   PONV: No   Neuro/Psych: Uneventful            Sign Out: Acceptable/Baseline neuro status   Airway/Respiratory: Uneventful            Sign Out: Acceptable/Baseline resp. status   CV/Hemodynamics: Uneventful            Sign Out: Acceptable CV status; No obvious hypovolemia; No obvious fluid overload   Other NRE: NONE   DID A NON-ROUTINE EVENT OCCUR? No           Last vitals:  Vitals Value Taken Time   BP     Temp     Pulse 76 07/24/25 14:38   Resp 9 07/24/25 14:38   SpO2 95 % 07/24/25 14:38   Vitals shown include unfiled device data.    Electronically Signed By: Arsenio Capellan MD  July 24, 2025  2:40 PM

## 2025-07-24 NOTE — ANESTHESIA PROCEDURE NOTES
Airway       Patient location during procedure: OR       Procedure Start/Stop Times: 7/24/2025 12:52 PM  Staff -        CRNA: Vicenta Drummond APRN CRNA       Performed By: CRNA  Consent for Airway        Urgency: elective  Indications and Patient Condition       Indications for airway management: mike-procedural       Induction type:intravenous       Mask difficulty assessment: 1 - vent by mask    Final Airway Details       Final airway type: endotracheal airway       Successful airway: NIM  Endotracheal Airway Details        ETT size (mm): 7.0       Successful intubation technique: video laryngoscopy       VL Blade Size: Maria 3       Grade View of Cords: 1       Adjucts: stylet       Position: Right       Measured from: lips       Secured at (cm): 22       Bite block used: None    Post intubation assessment        Placement verified by: capnometry, equal breath sounds and chest rise        Number of attempts at approach: 1       Secured with: tape       Ease of procedure: easy       Dentition: Intact and Unchanged    Medication(s) Administered   Medication Administration Time: 7/24/2025 12:52 PM

## 2025-07-24 NOTE — PROGRESS NOTES
[facility]    General Surgery  Short Progress Note    Notified that patient needs to stay the night given no one available to stay with following anesthesia. Outpatient in a Bed orders placed. Patient okay to discharge home tomorrow WITHOUT seeing a provider if criteria met. All discharge orders in place.    ORDERS INCLUDE:  ~ Notify Provider IF patient FAILS to meet Discharge Goal criteria,   ~ IF Provider has already entered all discharge goals and/or cleared patient for discharge, you do NOT need to notify Provider PRIOR to patient discharge.    Suyapa Stanford PA-C

## 2025-07-25 VITALS
HEART RATE: 82 BPM | RESPIRATION RATE: 16 BRPM | OXYGEN SATURATION: 97 % | WEIGHT: 170 LBS | SYSTOLIC BLOOD PRESSURE: 128 MMHG | TEMPERATURE: 98.6 F | DIASTOLIC BLOOD PRESSURE: 99 MMHG | BODY MASS INDEX: 25.18 KG/M2 | HEIGHT: 69 IN

## 2025-07-25 LAB — GLUCOSE BLDC GLUCOMTR-MCNC: 153 MG/DL (ref 70–99)

## 2025-07-25 PROCEDURE — 250N000013 HC RX MED GY IP 250 OP 250 PS 637: Performed by: PHYSICIAN ASSISTANT

## 2025-07-25 RX ORDER — CALCIUM CARBONATE 500 MG/1
500 TABLET, CHEWABLE ORAL 2 TIMES DAILY WITH MEALS
Status: DISCONTINUED | OUTPATIENT
Start: 2025-07-25 | End: 2025-07-25 | Stop reason: HOSPADM

## 2025-07-25 RX ORDER — CALCIUM CARBONATE 500(1250)
500 TABLET ORAL 2 TIMES DAILY WITH MEALS
Status: DISCONTINUED | OUTPATIENT
Start: 2025-07-25 | End: 2025-07-25

## 2025-07-25 RX ORDER — CALCIUM CARBONATE 500 MG/1
500 TABLET, CHEWABLE ORAL DAILY PRN
Status: DISCONTINUED | OUTPATIENT
Start: 2025-07-25 | End: 2025-07-25

## 2025-07-25 RX ORDER — CALCIUM CARBONATE 500 MG/1
2 TABLET, CHEWABLE ORAL PRN
Qty: 30 TABLET | Refills: 0 | Status: SHIPPED | OUTPATIENT
Start: 2025-07-25

## 2025-07-25 RX ADMIN — AMLODIPINE BESYLATE 5 MG: 5 TABLET ORAL at 09:03

## 2025-07-25 RX ADMIN — Medication 50 MG: at 09:03

## 2025-07-25 RX ADMIN — ROSUVASTATIN CALCIUM 40 MG: 20 TABLET, COATED ORAL at 08:59

## 2025-07-25 RX ADMIN — ACETAMINOPHEN 975 MG: 325 TABLET ORAL at 09:11

## 2025-07-25 RX ADMIN — Medication 25 MCG: at 09:02

## 2025-07-25 RX ADMIN — IRBESARTAN 150 MG: 150 TABLET ORAL at 09:03

## 2025-07-25 RX ADMIN — GLIMEPIRIDE 2 MG: 2 TABLET ORAL at 09:02

## 2025-07-25 RX ADMIN — CALCIUM CARBONATE 500 MG: 500 TABLET, CHEWABLE ORAL at 09:11

## 2025-07-25 RX ADMIN — FAMOTIDINE 20 MG: 20 TABLET, FILM COATED ORAL at 09:03

## 2025-07-25 RX ADMIN — OXYCODONE HYDROCHLORIDE 5 MG: 5 TABLET ORAL at 09:12

## 2025-07-25 RX ADMIN — METFORMIN HYDROCHLORIDE 500 MG: 500 TABLET, EXTENDED RELEASE ORAL at 09:04

## 2025-07-25 ASSESSMENT — ACTIVITIES OF DAILY LIVING (ADL)
ADLS_ACUITY_SCORE: 15

## 2025-07-25 NOTE — PROGRESS NOTES
DATE & SHIFT: 7/24 1900-2330  PRIMARY Concern: Parathyroidectomy, overnight monitoring  SAFETY RISK Concerns (fall risk, behaviors, etc.): none      Aggression Tool Color: green  Isolation/Type: none  Tests/Procedures for NEXT shift: none  Consults? (Pending/following, signed-off?) surgery, no hospitalist   Where is patient from? (Home, TCU, etc.): home  Other Important info for NEXT shift:   Anticipated DC date & active delays: tomorrow, 7/25   _____________________________________________________________________________  SUMMARY NOTE:   Orientation/Cognitive: A/Ox4   Observation Goals (Met/ Not Met): N/A   Mobility Level/Assist Equipment: SBA/IND  Antibiotics & Plan (IV/po, length of tx left): none  Pain Management: PRN oxy given x1  Complete Pain Reassessment: Y/N yes Due next: next shift   Tele/VS/O2: vitally stable on RA ex HTN   ABNL Lab/BG: See results   Diet: regular  Bowel/Bladder: cont. Voiding well post op   Skin Concerns: incision to medial neck. Small amount of dried blood drainage to gauze  Drains/Devices: PIV SL  Patient Stated Goal for Today: rest; pain control

## 2025-07-25 NOTE — PROGRESS NOTES
Discharge Goals - Surgical Patient  (Discharge Goals - Overnight Stay (OUTPATIENT in a Bed))  PRIOR TO DISCHARGE        Comments: Discharge Goals that MUST be met PRIOR to Discharge IF PATIENT IS REGISTERED AS OUTPATIENT in a Bed:  ~ Patient vital signs are at baseline: MET  ~ Patient able to ambulate as they were prior to admission or with assist devices provided by therapies during their stay: MET  ~ Patient MUST void prior to discharge: MET  ~ Patient able to tolerate oral intake to maintain hydration status: MET  ~ Patient has adequate pain control using Oral analgesics: MET  ~ Hypercapnia, hypoventilation or hypoxia resolved for at least 2 hours without supplemental oxygen: MET  ~ Deficits in sensation, mobility or coordination have resolved if spinal or regional anesthesia was used: MET   ~ Patient has returned to baseline mental status: MET     ~ Notify Provider IF patient FAILS to meet Discharge Goal criteria,  ~ IF Provider has already entered all discharge goals and/or cleared patient for discharge, you do NOT need to notify Provider PRIOR to patient discharge.

## 2025-07-25 NOTE — PROGRESS NOTES
[facility]    General Surgery  Daily Progress Note       Assessment and Plan:   Tonia Villavicencio is a 80 year old female 1 day s/p right parathyroidectomy with bilateral exploration    PLAN:  - Give calcium carbonate this morning. Os Rudolph not available inpatient.  - Discussed symptoms of hypocalcemia with patient.  - Diet as tolerated.   - Oral analgesia prn. Senokot at bedtime.   - Ambulate QID to assist with bowel function, PCDs for DVT prophylaxis.   - Encourage deep breathe and IS.     DISPOSITION:  - Discharge home today.  - Instructions reviewed. Sent with norco, senokot, OsCal, and tums.  - Follow up with Dr. Colon in 2-3 weeks.        Interval History:   Tonia Villavicencio is seen on surgical rounds. She felt a sudden change in her overall health after surgery. She woke up positively and with more energy. She denies perioral or digital paraesthesia or numbness. She denies changes in her voice or hoarseness. She endorses some pain with swallowing but tolerating diet, ate entire breakfast. Pain is well controlled with oral analgesia. In chart review I see that Os Rudolph was not given last evening or this morning given it was not available. She has a friend that required inpatient calcium following this surgery and is concerned with this.         Physical Exam:   Temp: 98.4  F (36.9  C) Temp src: Oral BP: 123/79 Pulse: 103   Resp: 16 SpO2: 100 % O2 Device: None (Room air)      I/O last 3 completed shifts:  In: 1016.19 [I.V.:1016.19]  Out: 5 [Blood:5]    GENERAL: VS reviewed, alert, oriented, no acute distress, strong voice  LUNGS: Normal respiratory effort, no wheezing  NECK: No swelling. Steris in place without saturation. No surrounding erythema.  EXTREMITIES: Moving all extremities, no gross deformities  NEUROLOGICAL: Grossly non-focal, mood & affect appropriate    Data   Recent Labs   Lab 07/25/25  0652   *       Suyapa Stanford PA-C    Please use [employer] to page 7:30am-4pm,  page on-call surgeon after 4pm  Office number: [phone]

## 2025-07-25 NOTE — PROGRESS NOTES
DATE & SHIFT: 7/24 1900-2330  PRIMARY Concern: Parathyroidectomy, overnight monitoring  SAFETY RISK Concerns (fall risk, behaviors, etc.): none      Aggression Tool Color: green  Isolation/Type: none  Tests/Procedures for NEXT shift: none  Consults? (Pending/following, signed-off?) surgery, no hospitalist   Where is patient from? (Home, TCU, etc.): home  Other Important info for NEXT shift:   Anticipated DC date & active delays: tomorrow, 7/25   _____________________________________________________________________________  SUMMARY NOTE:   Orientation/Cognitive: A/Ox4   Observation Goals (Met/ Not Met): N/A   Mobility Level/Assist Equipment: SBA/IND  Antibiotics & Plan (IV/po, length of tx left): none  Pain Management: Denies   Complete Pain Reassessment: Y/N yes Due next: next shift   Tele/VS/O2: vitally stable on RA ex HTN   ABNL Lab/BG: See results   Diet: regular  Bowel/Bladder: cont. Voiding well post op   Skin Concerns: incision to medial neck. Small amount of dried blood drainage to gauze  Drains/Devices: PIV SL  Patient Stated Goal for Today: rest; pain control

## 2025-07-25 NOTE — PLAN OF CARE
.DATE & SHIFT: 7/25/2025 - [id number]  PRIMARY Concern: Parathyroidectomy, overnight monitoring  SAFETY RISK Concerns (fall risk, behaviors, etc.): N/A      Aggression Tool Color: Green  Isolation/Type: N/A  Tests/Procedures for NEXT shift: N/A  Consults? (Pending/following, signed-off?) Pt to follow up in 2-3 wks with Dr. Colon.  Where is patient from? (Home, TCU, etc.): Home  Other Important info for NEXT shift:   Anticipated DC date & active delays: 7/25/2025  _____________________________________________________________________________  SUMMARY NOTE:   Orientation/Cognitive: A/)X4 - forgetful  Observation Goals (Met/ Not Met): Met  Mobility Level/Assist Equipment: Ind  Antibiotics & Plan (IV/po, length of tx left): N/A  Pain Management: Norco as needed  Complete Pain Reassessment: Y/N No Due next: N/A  Tele/VS/O2: VSS on RA  ABNL Lab/BG: N/A  Diet: Regular  Bowel/Bladder: Cont.   Skin Concerns: Surgical site - CDI  Drains/Devices: PIV removed  Patient Stated Goal for Today: Go home!

## 2025-07-25 NOTE — PLAN OF CARE
DATE & SHIFT: 7/24 [id number]  PRIMARY Concern: Parathyroidectomy, overnight monitoring  SAFETY RISK Concerns (fall risk, behaviors, etc.): none      Aggression Tool Color: green  Isolation/Type: none  Tests/Procedures for NEXT shift: none  Consults? (Pending/following, signed-off?) Gen surgery, no hospitalist   Where is patient from? (Home, TCU, etc.): home  Other Important info for NEXT shift: none  Anticipated DC date & active delays: Today 7/25     SUMMARY NOTE:  Orientation/Cognitive: A/Ox4   Observation Goals (Met/ Not Met): N/A   Mobility Level/Assist Equipment: SBA/IND  Antibiotics & Plan (IV/po, length of tx left): none  Pain Management: Denies  Complete Pain Reassessment: Y/N yes Due next: next shift   Tele/VS/O2: vitally stable on RA  ABNL Lab/BG: See results   Diet: regular  Bowel/Bladder: cont. Voiding well post op   Skin Concerns: incision to medial neck. Small amount of dried blood drainage to gauze  Drains/Devices: PIV SL  Patient Stated Goal for Today: rest; pain control

## 2025-07-25 NOTE — PROGRESS NOTES
Discharge instructions given, AVS printed and reviewed with pt. Pt is A/Ox4, VSS on room air. Up ind, surgical site CDI. Pt discharged from door 2. Friend provided ride home.

## 2025-07-25 NOTE — DISCHARGE SUMMARY
[facility] Discharge Summary    Tonia Villavicencio MRN# [id number]   Age: 80 year old YOB: 1945     Date of Admission:  7/24/2025  Date of Discharge:  7/25/2025 12:22 PM  Admitting Provider:  Jaime Colon MD  Discharge Provider:  Suyapa Stanford PA-C  Discharging Service: General Surgery     Primary Provider: Sasha Anglin  Primary Care Physician Phone Number: [phone]          Admission Diagnoses:   Principle Diagnosis: Hyperparathyroidism [E21.3]  S/P parathyroidectomy [Z98.890, Z90.89]  Secondary Diagnoses:          Discharge Diagnosis:     Hyperparathyroidism [E21.3]          Procedures:     Procedure(s):  Right parathyroidectomy with bilateral neck exploration            Discharge Medications:     Discharge Medication List as of 7/25/2025 11:15 AM        START taking these medications    Details   calcium carbonate (TUMS) 500 MG chewable tablet Take 2 tablets (1,000 mg) by mouth as needed for other (numbness or tingling around the mouth or fingers)., Disp-30 tablet, R-0, E-Prescribe      HYDROcodone-acetaminophen (NORCO) 5-325 MG tablet Take 1 tablet by mouth every 4 hours as needed for moderate to severe pain., Disp-6 tablet, R-0, E-Prescribe      senna-docusate (SENOKOT-S/PERICOLACE) 8.6-50 MG tablet Take 1-2 tablets by mouth 2 times daily as needed for constipation (while taking norco)., Disp-15 tablet, R-0, E-Prescribe           CONTINUE these medications which have CHANGED    Details   calcium carbonate-vitamin D (OSCAL) 500-5 MG-MCG tablet Take 2 tablets by mouth 2 times daily (with meals)., Disp-120 tablet, R-0, E-Prescribe           CONTINUE these medications which have NOT CHANGED    Details   amLODIPine (NORVASC) 5 MG tablet Take 5 mg by mouth 2 times daily., Historical      glimepiride (AMARYL) 2 MG tablet Take 1 tablet (2 mg) by mouth 2 times daily For diabetes, Disp-180 tablet, R-0, E-PrescribeDue for office visit. Please call the clinic and make appointment       irbesartan (AVAPRO) 150 MG tablet Take 150 mg by mouth 2 times daily., Historical      metFORMIN (GLUCOPHAGE-XR) 500 MG 24 hr tablet TAKE 4 TABLETS (2,000 MG) BY MOUTH DAILY (WITH DINNER), Disp-360 tablet, R-0, E-Prescribe      metoprolol tartrate (LOPRESSOR) 50 MG tablet Take 1 tablet (50 mg) by mouth 2 times daily, Disp-60 tablet, R-3, E-Prescribe      Multiple Vitamins-Minerals (LUTEIN-ZEAXANTHIN PO) Take by mouth., Historical      Omega-3 Fatty Acids (FISH OIL PO) Take by mouth daily., Historical      rosuvastatin (CRESTOR) 40 MG tablet Take 40 mg by mouth daily., Historical      semaglutide (OZEMPIC, 1 MG/DOSE,) 2 MG/1.5ML pen Inject 1 mg subcutaneously every 7 days., Historical      ACCU-CHEK GUIDE test strip USE TO TEST BLOOD SUGAR 1 TIME DAILY OR AS DIRECTED., Disp-50 strip, R-0, E-Prescribe      alcohol swab prep pads Use to swab area of injection/cheryl as directed.Disp-100 each, [id number]      blood glucose monitoring (ACCU-CHEK FASTCLIX) lancets Test 1 time a day, Disp-102 each,R-6, E-Prescribe      Continuous Blood Gluc Sensor (DEXCOM G6 SENSOR) MISC 1 each every 10 days Change every 10 days., Disp-3 each, R-11, E-Prescribe      Continuous Blood Gluc Transmit (DEXCOM G6 TRANSMITTER) MISC 1 each every 3 months Change every 3 months., Disp-1 each, R-3, E-Prescribe      STATIN NOT PRESCRIBED, INTENTIONAL, Reason Statin was Not Prescribed: OTHER - Enter reason is comments section (This option does NOTexclude patient from measure) / refusal           STOP taking these medications       cholecalciferol (VITAMIN D3) 25 mcg (1000 units) capsule Comments:   Reason for Stopping:                   Allergies:         Allergies   Allergen Reactions    Lisinopril Cough             Brief History of Illness:    Reason for your hospital stay      S/p parathyroidectomy          Tonia Villavicencio is a 80-year-old female who presented with hypercalcemia to initial consultation with Dr. Colon on 6/4/2024.  This was  noted by her primary care office.  Calcium was 10.5 at that time and a PTH was ordered.  This was elevated in the low 80s.  Patient had a bone density scan around that time which showed osteopenia with a low fracture risk.  No history of nephrolithiasis or pathologic fracture.  She was managed conservatively for some time but was seen for follow-up recently.  Her calcium was elevated, in the 11.4 range.  Patient does have complaints of fatigue.  She is clinically euthyroid.  No history of surgery to the neck.  She is here to discuss her primary hyperparathyroidism.  High-resolution neck CT suggested a right superior nodule which could be a possible parathyroid adenoma and there was also a lesion in the left superior neck that looked suspicious for parathyroid.  Patient had a lot of suspicion and apprehension about surgery.  She recounted many instances in her life as well as her friends experiences where surgery was a poor choice.  I have explained the surgical procedure and the relative risks and benefits.  Certainly surgery is not urgent or mandatory, but I think considering surgery at this point will help optimize bone health as she gets older.  She said she wanted to discuss again in the late summer and early fall, at which point she may consider scheduling surgery.  After being seen again 6/26/2025, she felt more comfortable with proceeding with bilateral neck exploration with parathyroidectomy.     After discussing the risks, benefits, and possible complications, informed consent was obtained and the patient underwent right parathyroidectomy with bilateral neck exploration on 7/24.  There were no complications.  Please see the Operative Report for full details.           Hospital Course:   Tonia Villavicencio's hospital course was unremarkable.  She recovered as anticipated and experienced no post-operative complications.  She did not have symptoms or hypocalcemia.  She had good pain control, tolerating diet,  "and pain controlled with oral analgesia.    On the date of discharge, the patient was discharged to home in stable condition.  She verbalized understanding of all discharge instructions and felt comfortable with the discharge plan.  She was asked to call with any further questions or concerns.         Condition on Discharge:        Discharge condition: Stable   Discharge vitals: Blood pressure (!) 128/99, pulse 82, temperature 98.6  F (37  C), temperature source Oral, resp. rate 16, height 1.753 m (5' 9\"), weight 77.1 kg (170 lb), SpO2 97%, not currently breastfeeding.           Discharge Disposition:     Discharged to home          Discharge Instructions and Follow-Up:      Tonia Villavicencio was asked to follow up with surgical team in 1-2 weeks.      Suyapa Stanford PA-C  [facility]  [phone]       "

## 2025-07-28 LAB
PATH REPORT.COMMENTS IMP SPEC: NORMAL
PATH REPORT.COMMENTS IMP SPEC: NORMAL
PATH REPORT.FINAL DX SPEC: NORMAL
PATH REPORT.GROSS SPEC: NORMAL
PATH REPORT.INTRAOP OBS SPEC DOC: NORMAL
PATH REPORT.MICROSCOPIC SPEC OTHER STN: NORMAL
PATH REPORT.RELEVANT HX SPEC: NORMAL
PHOTO IMAGE: NORMAL

## 2025-07-28 PROCEDURE — 88305 TISSUE EXAM BY PATHOLOGIST: CPT | Mod: 26 | Performed by: PATHOLOGY

## 2025-07-28 PROCEDURE — 88331 PATH CONSLTJ SURG 1 BLK 1SPC: CPT | Mod: 26 | Performed by: PATHOLOGY

## 2025-07-30 NOTE — OP NOTE
General Surgery Operative Note    PREOPERATIVE DIAGNOSIS:  Primary hyperparathyroidism.    POSTOPERATIVE DIAGNOSIS:  Primary hyperparathyroidism.    PROCEDURE:   Excision of right middle parathyroid adenoma, Unilateral neck exploration.    ANESTHESIA:  General.    PREOPERATIVE MEDICATIONS:  Ancef IV.    SURGEON:  Jaime Colon MD, MD    ASSISTANT:  Suyapa Stanford PA-C  First assistant was necessary due to challenging exposure and the need for improved visualization and help maintaining hemostasis.      ESTIMATED BLOOD LOSS: 10 cc's    INDICATIONS:  Tonia Villavicencio is a 80 year old female who has primary hyperparathyroidism. She has had symptoms of fatigue and difficulty concentrating.  She has been found to have an elevated calcium of greater than 11.  She has a localizing imaging scan in the right middle neck.  She presents today for neck exploration, excision of parathyroid adenoma.  Her PTH preoperatively is 109.    DESCRIPTION OF PROCEDURE:  The patient was placed supine, head and neck in extension and a bump between the scapulae.  Transverse cervical neck creases had been marked in the preinduction area and the one most suitable was utilized for exposure.  Incision was made, superior and inferior skin flaps raised.  Midline fascia opened and reflected to the right.  An abnormal parathyroid was identified at the right superior location.  It was meticulously dissected from the surrounding tissue and submitted for frozen section which confirmed a 300 mg hypercellular parathyroid.  We then explored the left middle neck.  During the exploration, I encountered what I believe to be the second parathyroid. It was not biopsied but appeared to represent normal parathyroid tissue. The site was irrigated and inspected for hemostasis.  The PTH assays were sent at 15 and 25 minutes post-excision and the first value came back at 46, signifying the completeness of the dissection.  The patient's incision site was  then closed with running 3-0 Vicryl for the midline fascia, interrupted for platysma and 4-0 subcuticular Monocryl for skin.  Marcaine 0.25% plain was instilled and the patient transferred to recovery in good condition.    INTRAOPERATIVE FINDINGS:  1.  A 0.3 gram hypercellular right middle parathyroid correlated nicely with preoperative imaging.  2.  Second left-sided parathyroid appeared to be grossly normal.  3.  PTH assay diminished from 109 to 46 at 25 minutes post excision.  4.  Grossly normal but nodular thyroid.    Specimens:   ID Type Source Tests Collected by Time Destination   1 : RIGHT INFERIOR NECK NODULE Tissue Neck, Right SURGICAL PATHOLOGY EXAM Jaime Colon MD 7/24/2025  1:29 PM    A : PARATHYROIDECTOMY Blood Blood, Venous PARATHYROID HORMONE INTACT Jaime Colon MD 7/24/2025  1:49 PM    B : PARATHYROIDECTOMY Blood Blood, Venous PARATHYROID HORMONE INTACT Jaime Colon MD 7/24/2025  1:59 PM        Jaime Colon MD, MD

## (undated) DEVICE — PREP CHLORAPREP W/ORANGE TINT 10.5ML 930715

## (undated) DEVICE — DRSG TEGADERM 2 3/8X2 3/4" 1624W

## (undated) DEVICE — GLOVE PROTEXIS MICRO 7.5 LT BLUE 2D73PM75

## (undated) DEVICE — SU MONOCRYL 4-0 P-3 18" UND Y494G

## (undated) DEVICE — PACK UNIVERSAL SPLIT 29131

## (undated) DEVICE — NIM PROBE PRASS STIMULATOR PROTECTED TIP 8225101

## (undated) DEVICE — DISSECTOR SONICISION 7 CURVED JAW 13CM SCD7A13

## (undated) DEVICE — NEEDLE HYPO MONOJECT STANDARD 22GA 1 1/2IN BLUE 1188822112

## (undated) DEVICE — NDL 25GA 1.5" 305127

## (undated) DEVICE — PACK MINOR SBA15MIFSE

## (undated) DEVICE — BLADE KNIFE SURG 15 371115

## (undated) DEVICE — SOL WATER IRRIG 1000ML BOTTLE 2F7114

## (undated) DEVICE — ESU HOLSTER PLASTIC DISP E2400

## (undated) DEVICE — SYR 03ML LL W/O NDL 309657

## (undated) DEVICE — ESU GROUND PAD UNIVERSAL W/O CORD

## (undated) DEVICE — DRSG GAUZE 2X2" 8042

## (undated) DEVICE — ESU ELEC BLADE 2.75" COATED/INSULATED E1455

## (undated) DEVICE — SUCTION MANIFOLD NEPTUNE 2 SYS 4 PORT 0702-020-000

## (undated) DEVICE — ESU PENCIL SMOKE EVAC W/ROCKER SWITCH 0703-047-000

## (undated) DEVICE — DRSG STERI STRIP 1/4X3" R1541

## (undated) DEVICE — SU VICRYL+ 3-0 27IN SH UND VCP416H

## (undated) DEVICE — TUBE ENDOTRACHEAL NIM EMG 8.0MM 8229308

## (undated) DEVICE — LINEN TOWEL PACK X5 5464

## (undated) DEVICE — KIT TURNOVER FAIRVIEW SOUTHDALE FULL SP3889

## (undated) DEVICE — GLOVE PROTEXIS BLUE W/NEU-THERA 7.5  2D73EB75

## (undated) DEVICE — SYR EAR 3OZ BULB IRR STRL DISP BLU PVC 4173

## (undated) DEVICE — SOL NACL 0.9% IRRIG 1000ML BOTTLE 2F7124

## (undated) RX ORDER — PROPOFOL 10 MG/ML
INJECTION, EMULSION INTRAVENOUS
Status: DISPENSED
Start: 2025-07-24

## (undated) RX ORDER — FENTANYL CITRATE 50 UG/ML
INJECTION, SOLUTION INTRAMUSCULAR; INTRAVENOUS
Status: DISPENSED
Start: 2018-05-09

## (undated) RX ORDER — REMIFENTANIL HYDROCHLORIDE 1 MG/ML
INJECTION, POWDER, LYOPHILIZED, FOR SOLUTION INTRAVENOUS
Status: DISPENSED
Start: 2025-07-24

## (undated) RX ORDER — FENTANYL CITRATE 50 UG/ML
INJECTION, SOLUTION INTRAMUSCULAR; INTRAVENOUS
Status: DISPENSED
Start: 2025-07-24

## (undated) RX ORDER — DEXAMETHASONE SODIUM PHOSPHATE 4 MG/ML
INJECTION, SOLUTION INTRA-ARTICULAR; INTRALESIONAL; INTRAMUSCULAR; INTRAVENOUS; SOFT TISSUE
Status: DISPENSED
Start: 2025-07-24

## (undated) RX ORDER — ONDANSETRON 2 MG/ML
INJECTION INTRAMUSCULAR; INTRAVENOUS
Status: DISPENSED
Start: 2025-07-24